# Patient Record
Sex: MALE | Race: BLACK OR AFRICAN AMERICAN | NOT HISPANIC OR LATINO | Employment: FULL TIME | URBAN - METROPOLITAN AREA
[De-identification: names, ages, dates, MRNs, and addresses within clinical notes are randomized per-mention and may not be internally consistent; named-entity substitution may affect disease eponyms.]

---

## 2018-06-01 ENCOUNTER — OFFICE VISIT (OUTPATIENT)
Dept: INTERNAL MEDICINE | Facility: CLINIC | Age: 28
End: 2018-06-01
Payer: COMMERCIAL

## 2018-06-01 ENCOUNTER — LAB VISIT (OUTPATIENT)
Dept: LAB | Facility: HOSPITAL | Age: 28
End: 2018-06-01
Attending: FAMILY MEDICINE
Payer: COMMERCIAL

## 2018-06-01 VITALS
HEART RATE: 87 BPM | DIASTOLIC BLOOD PRESSURE: 76 MMHG | OXYGEN SATURATION: 98 % | SYSTOLIC BLOOD PRESSURE: 132 MMHG | WEIGHT: 203.25 LBS | HEIGHT: 70 IN | TEMPERATURE: 98 F | BODY MASS INDEX: 29.1 KG/M2

## 2018-06-01 DIAGNOSIS — F41.0 PANIC DISORDER WITHOUT AGORAPHOBIA WITH MILD PANIC ATTACKS: ICD-10-CM

## 2018-06-01 DIAGNOSIS — Z00.00 ENCOUNTER FOR WELLNESS EXAMINATION: ICD-10-CM

## 2018-06-01 DIAGNOSIS — Z00.00 ENCOUNTER FOR WELLNESS EXAMINATION: Primary | ICD-10-CM

## 2018-06-01 LAB
25(OH)D3+25(OH)D2 SERPL-MCNC: 14 NG/ML
ALBUMIN SERPL BCP-MCNC: 4.5 G/DL
ALP SERPL-CCNC: 60 U/L
ALT SERPL W/O P-5'-P-CCNC: 17 U/L
ANION GAP SERPL CALC-SCNC: 8 MMOL/L
AST SERPL-CCNC: 19 U/L
BASOPHILS # BLD AUTO: 0.04 K/UL
BASOPHILS NFR BLD: 0.7 %
BILIRUB SERPL-MCNC: 0.5 MG/DL
BUN SERPL-MCNC: 14 MG/DL
CALCIUM SERPL-MCNC: 9.8 MG/DL
CHLORIDE SERPL-SCNC: 105 MMOL/L
CHOLEST SERPL-MCNC: 166 MG/DL
CHOLEST/HDLC SERPL: 2.8 {RATIO}
CO2 SERPL-SCNC: 29 MMOL/L
CREAT SERPL-MCNC: 1.2 MG/DL
DIFFERENTIAL METHOD: NORMAL
EOSINOPHIL # BLD AUTO: 0.1 K/UL
EOSINOPHIL NFR BLD: 1.8 %
ERYTHROCYTE [DISTWIDTH] IN BLOOD BY AUTOMATED COUNT: 11.7 %
EST. GFR  (AFRICAN AMERICAN): >60 ML/MIN/1.73 M^2
EST. GFR  (NON AFRICAN AMERICAN): >60 ML/MIN/1.73 M^2
GLUCOSE SERPL-MCNC: 101 MG/DL
HCT VFR BLD AUTO: 44.6 %
HDLC SERPL-MCNC: 59 MG/DL
HDLC SERPL: 35.5 %
HGB BLD-MCNC: 15.3 G/DL
IMM GRANULOCYTES # BLD AUTO: 0.02 K/UL
IMM GRANULOCYTES NFR BLD AUTO: 0.4 %
LDLC SERPL CALC-MCNC: 101.4 MG/DL
LYMPHOCYTES # BLD AUTO: 1.7 K/UL
LYMPHOCYTES NFR BLD: 30.6 %
MCH RBC QN AUTO: 30.4 PG
MCHC RBC AUTO-ENTMCNC: 34.3 G/DL
MCV RBC AUTO: 89 FL
MONOCYTES # BLD AUTO: 0.6 K/UL
MONOCYTES NFR BLD: 11.2 %
NEUTROPHILS # BLD AUTO: 3 K/UL
NEUTROPHILS NFR BLD: 55.3 %
NONHDLC SERPL-MCNC: 107 MG/DL
NRBC BLD-RTO: 0 /100 WBC
PLATELET # BLD AUTO: 232 K/UL
PMV BLD AUTO: 10.5 FL
POTASSIUM SERPL-SCNC: 4.2 MMOL/L
PROT SERPL-MCNC: 7.8 G/DL
RBC # BLD AUTO: 5.04 M/UL
SODIUM SERPL-SCNC: 142 MMOL/L
TRIGL SERPL-MCNC: 28 MG/DL
TSH SERPL DL<=0.005 MIU/L-ACNC: 1.16 UIU/ML
WBC # BLD AUTO: 5.43 K/UL

## 2018-06-01 PROCEDURE — 99204 OFFICE O/P NEW MOD 45 MIN: CPT | Mod: S$GLB,,, | Performed by: FAMILY MEDICINE

## 2018-06-01 PROCEDURE — 84443 ASSAY THYROID STIM HORMONE: CPT

## 2018-06-01 PROCEDURE — 80053 COMPREHEN METABOLIC PANEL: CPT

## 2018-06-01 PROCEDURE — 85025 COMPLETE CBC W/AUTO DIFF WBC: CPT

## 2018-06-01 PROCEDURE — 36415 COLL VENOUS BLD VENIPUNCTURE: CPT | Mod: PO

## 2018-06-01 PROCEDURE — 82306 VITAMIN D 25 HYDROXY: CPT

## 2018-06-01 PROCEDURE — 80061 LIPID PANEL: CPT

## 2018-06-01 PROCEDURE — 99999 PR PBB SHADOW E&M-EST. PATIENT-LVL IV: CPT | Mod: PBBFAC,,, | Performed by: FAMILY MEDICINE

## 2018-06-01 RX ORDER — VENLAFAXINE HYDROCHLORIDE 75 MG/1
75 CAPSULE, EXTENDED RELEASE ORAL DAILY
Qty: 30 CAPSULE | Refills: 6 | Status: SHIPPED | OUTPATIENT
Start: 2018-06-01 | End: 2019-05-08

## 2018-06-01 RX ORDER — VENLAFAXINE HYDROCHLORIDE 37.5 MG/1
37.5 CAPSULE, EXTENDED RELEASE ORAL DAILY
Qty: 7 CAPSULE | Refills: 0 | Status: SHIPPED | OUTPATIENT
Start: 2018-06-01 | End: 2018-06-08

## 2018-06-01 RX ORDER — HYDROXYZINE PAMOATE 50 MG/1
CAPSULE ORAL
Refills: 0 | COMMUNITY
Start: 2018-04-09 | End: 2019-05-08

## 2018-06-01 NOTE — PROGRESS NOTES
Subjective:       Patient ID: Alphonse Claude Mackey III is a 27 y.o. male.    Chief Complaint: Establish Care and Anxiety    26 yo male here to establish care. He has had 3 recent episodes of chest pain and shortness of breath with an unsettled feeling in his stomach. He was seen in the emergency room at [facility] 4/8/18 and was diagnosed with anxiety. ER record reviewed with work up that included CXR and EKG. He was prescribed hydroxyzine as needed and finds the medication effective--he has not had to take it very often. He has been doing ok in terms of his daily functioning but is concerned that this feeling continues to happen to him without warning.       Anxiety   Symptoms include nervous/anxious behavior. Patient reports no chest pain, decreased concentration, palpitations or shortness of breath.          does not have any pertinent problems on file.  Past Medical History:   Diagnosis Date    Anxiety      Past Surgical History:   Procedure Laterality Date    ADENOIDECTOMY      TONSILLECTOMY       History reviewed. No pertinent family history.  Social History     Social History    Marital status: Single     Spouse name: N/A    Number of children: N/A    Years of education: N/A     Occupational History    Not on file.     Social History Main Topics    Smoking status: Never Smoker    Smokeless tobacco: Not on file    Alcohol use No    Drug use: No    Sexual activity: Not on file     Other Topics Concern    Not on file     Social History Narrative    No narrative on file     Review of Systems   Constitutional: Positive for appetite change. Negative for activity change, fatigue, fever and unexpected weight change.   HENT: Negative for dental problem and hearing loss.    Eyes: Negative for pain and visual disturbance.   Respiratory: Negative for shortness of breath and wheezing.    Cardiovascular: Negative for chest pain and palpitations.   Gastrointestinal: Negative for abdominal pain, constipation and  diarrhea.   Genitourinary: Negative for difficulty urinating and dysuria.        No erectile dysfunction   Musculoskeletal: Negative for joint swelling and myalgias.   Skin: Negative for rash and wound.   Neurological: Negative for light-headedness and headaches.   Psychiatric/Behavioral: Negative for decreased concentration, dysphoric mood and sleep disturbance. The patient is nervous/anxious.        Objective:     Vitals:    06/01/18 0901   BP: 132/76   Pulse: 87   Temp: 98 °F (36.7 °C)        Physical Exam   Constitutional: He is oriented to person, place, and time. He appears well-developed and well-nourished.   HENT:   Head: Normocephalic and atraumatic.   Nose: Nose normal.   Mouth/Throat: Oropharynx is clear and moist.   Eyes: Conjunctivae and EOM are normal. Pupils are equal, round, and reactive to light. No scleral icterus.   Neurological: He is alert and oriented to person, place, and time.   Normal gait.   No speech abnormality   Psychiatric: He has a normal mood and affect. His behavior is normal. Judgment and thought content normal.   Nursing note and vitals reviewed.      Assessment:       1. Encounter for wellness examination    2. Panic disorder without agoraphobia with mild panic attacks        Plan:           Problem List Items Addressed This Visit     Panic disorder without agoraphobia with mild panic attacks    Current Assessment & Plan     Start on low dose Effexor; discussed medication use for prevention of anxiety and panic attacks as well as the benefit of cognitive behavioral therapy; referral made to psychology         Relevant Medications    venlafaxine (EFFEXOR-XR) 37.5 MG 24 hr capsule    venlafaxine (EFFEXOR-XR) 75 MG 24 hr capsule    Other Relevant Orders    TSH    Ambulatory referral to Psychology    Encounter for wellness examination - Primary    Current Assessment & Plan     Wellness labs ordered         Relevant Orders    CBC auto differential    Comprehensive metabolic panel     Lipid panel    Vitamin D

## 2018-06-01 NOTE — ASSESSMENT & PLAN NOTE
Start on low dose Effexor; discussed medication use for prevention of anxiety and panic attacks as well as the benefit of cognitive behavioral therapy; referral made to psychology

## 2018-06-01 NOTE — PATIENT INSTRUCTIONS
Understanding Panic Disorder (Panic Attack)  A panic attack is a sudden, intense fear that lasts for several minutes when there is no real danger. With it comes terror, physical symptoms, and a strong need to escape from wherever you are. If you have these attacks often, you have panic disorder. The attacks can be very frightening. You may be scared of having another one. You may even stay away from a place where youve had an attack. Some people become so afraid of having panic attacks, its very hard for them to leave home.  Before accepting a diagnosis of panic disorder, its important to see your healthcare provider. Your provider will evaluate your symptoms to make sure you dont have another health condition that is causing the symptoms. Conditions that can cause panic symptoms include certain heart and lung conditions and thyroid disease. Other things can also cause panic attack symptoms. These include having too much caffeine or using other stimulants, such as certain medicines.    What does a panic attack feel like?  Most panic attacks start suddenly, for no clear reason. They last about 5 to 20 minutes. A panic attack can start at any time, even while youre sleeping. During the attack, you may have:  · A sudden surge of anxiety, as if you just missed hitting someone with your car. But with a panic attack, youre anxious for no clear reason  · Physical symptoms, such as sweating, shortness of breath, a pounding heart, trembling, feeling like youre choking, chest pain, nausea, or dizziness  · A fear that youre having a heart attack, dying, or about to lose control  · A feeling that things happening around you are not real  What to do during a panic attack  · Remind yourself that your body is having a false alarm. Nothing bad will happen to you. Youve survived attacks before, and you will this time, too.  · Dont fight your feelings. Let them come. Ride them out. Focus on a task like counting backward  from 100. Think about some place relaxing, such as a tropical island or quiet meadow. Ask your healthcare provider or therapist to suggest other relaxation techniques.  · If your symptoms worsen or occur more often, see your healthcare provider.  Help to overcome the fear  Fear of a panic attack can make you miserable. But with professional support and self-monitoring strategies, this fear can be managed. Ask your healthcare provider or therapist for help. Remember these tips:  · Keep in mind that places and activities dont cause attacks. Separate the attack from the situation. Make an effort not to avoid the situation in the future.  · Dont give in to the temptation to use alcohol or unprescribed medicines as an escape. In the long run, they will only add to your problems.   Warning signs for suicide  Panic disorders can be a discouraging, frightening condition that can lead some people to consider self-harm or suicide. It is very important to work with a trusted therapist, take any medicines as prescribed, and seek help if you have any of these symptoms:  · Thinking often about taking your life  · Planning how you may try to end your life.  · Talking or writing about committing suicide  · Feeling that death is the only solution to your problems  · Feeling a pressing need to make out your will or arrange your   · Giving away things you own  · Participating in risky behaviors, such as sex with someone you don't know or drinking and driving  If you notice any of these warning signs, get help right away. You can call a mental health clinic, a 24-hour suicide crisis hotline, or go to a hospital emergency room.If there is an immediate risk, call 911.   Other resources  National Suicide Prevention Lifeline  340.724.4397 (339-112-DHBT)  www.suicidepreventionlifeline.org    National Suicide Hotline  633.619.1226 (800-SUICIDE)    National [place] [employer] [facility] [employer]ness (LEAH)  682.479.9341  www.leah.org    Mental  [facility]  235.157.3731  www.[facility].org  Date Last Reviewed: 5/1/2017  © 1744-1149 [employer], [employer]. [address] [place] [address], [place] [address]. All rights reserved. This information is not intended as a substitute for professional medical care. Always follow your healthcare professional's instructions.          Treating Panic Disorder (Panic Attack) with Therapy  If you have frequent panic attacks, you dont have to suffer anymore. Treatment is available. Therapy (also called counseling) is often a helpful treatment for panic disorder. With therapy, a specially trained professional (therapist) helps you face and learn to manage your anxiety. Therapy can be short-term or long-term depending on your needs. In some cases, medicine may also be prescribed along with therapy. It may take time before you notice how much therapy is helping, but stick with it. With therapy, you can feel better.    Cognitive behavioral therapy (CBT)  Cognitive behavioral therapy (CBT) teaches you to manage anxiety that can lead to panic attacks. It does this by helping you understand how you think and act when youre anxious. Research has shown CBT to be a very effective treatment for panic disorder. How CBT is run is almost like a class. It involves homework and activities to build skills that teach you to cope with anxiety step by step. It can be done in a group or one-on-one, and often takes place for a set number of sessions. CBT has two main parts:  · Cognitive therapy helps you identify the negative, irrational thoughts that occur with your anxiety. Youll learn to replace these with more positive, realistic thoughts.  · Behavioral therapy helps you change how you react to anxiety. Youll learn coping skills and methods for relaxing to help you better deal with anxiety.  Other forms of therapy  Other therapy methods may work better for you than CBT. Or, you may move from CBT to another form of therapy as your treatment needs  change. This may mean meeting with a therapist by yourself or in a group. Therapy can also help you work through problems in your life, such as drug or alcohol dependence, that may be making your anxiety worse.  Warning signs for suicide  Panic disorders can be a discouraging, frightening condition that can lead some people to consider self-harm or suicide. It is very important to work with a trusted therapist, take any medicines as prescribed, and seek help if you have any of these symptoms:  · Thinking often about taking your life  · Planning how you may attempt it.  · Talking or writing about committing suicide  · Feeling that death is the only solution to your problems  · Feeling a pressing need to make out your will or arrange your   · Giving away things you own  · Participating in risky behaviors, such as sex with someone you don't know or drinking and driving  If you notice any of these warning signs, get help right away. You can call a mental health clinic, a 24-hour suicide crisis hotline, or go to a hospital emergency room.  Other resources  National Suicide Prevention Lifeline  255.442.3583 (481-125-SOEP)  www.suicidepreventionlifeline.org  National Suicide Hotline  622.899.5729 (800-SUICIDE)  National [place] [employer] Mental Illness (LEAH)  574.880.5659  www.leah.org  [facility] [place]  490.228.9544  www.[facility].org  Getting better takes time  Therapy will help you feel better and teach you skills to help manage your panic disorder long term. But change doesnt happen right away. It takes a commitment from you. And treatment only works if you learn to face the causes of your anxiety. So, you might feel worse before you feel better. This can sometimes make it hard to stick with it. But remember: Therapy is a very effective treatment. The results will be well worth it.  Date Last Reviewed: 2017  © 5397-2664 [employer]. [address] [place] [address], [place] [address]. All rights reserved.  This information is not intended as a substitute for professional medical care. Always follow your healthcare professional's instructions.

## 2018-06-06 ENCOUNTER — TELEPHONE (OUTPATIENT)
Dept: INTERNAL MEDICINE | Facility: CLINIC | Age: 28
End: 2018-06-06

## 2018-06-06 NOTE — TELEPHONE ENCOUNTER
Spoke with patient via phone. He is reluctant to take Effexor because he is concerned about his body becoming dependent on a medication. We discussed risks and benefits of med. Effexor not considered addictive but helps some people long-term with control of anxiety. Discussed therapy as another option for controlling anxiety. He will think about it and let me know.

## 2018-06-06 NOTE — TELEPHONE ENCOUNTER
----- Message from Desi Fischer sent at 6/5/2018  4:50 PM CDT -----  Contact: Pt   Pt requested a callback to discuss medication he was prescribed ..[phone]

## 2018-06-25 ENCOUNTER — TELEPHONE (OUTPATIENT)
Dept: INTERNAL MEDICINE | Facility: CLINIC | Age: 28
End: 2018-06-25

## 2018-06-25 NOTE — TELEPHONE ENCOUNTER
Informed pt that the doctor would not be in clinic on 6/29/18 . Pt stated he will have to call back to reschedule this appt. /srb

## 2018-09-03 PROBLEM — Z00.00 ENCOUNTER FOR WELLNESS EXAMINATION: Status: RESOLVED | Noted: 2018-06-01 | Resolved: 2018-09-03

## 2019-05-08 ENCOUNTER — OFFICE VISIT (OUTPATIENT)
Dept: INTERNAL MEDICINE | Facility: CLINIC | Age: 29
End: 2019-05-08
Payer: COMMERCIAL

## 2019-05-08 VITALS
HEIGHT: 70 IN | SYSTOLIC BLOOD PRESSURE: 130 MMHG | HEART RATE: 96 BPM | BODY MASS INDEX: 30.21 KG/M2 | TEMPERATURE: 98 F | OXYGEN SATURATION: 99 % | WEIGHT: 211 LBS | DIASTOLIC BLOOD PRESSURE: 76 MMHG

## 2019-05-08 DIAGNOSIS — K64.1 PROLAPSED INTERNAL HEMORRHOIDS, GRADE 2: ICD-10-CM

## 2019-05-08 PROCEDURE — 99214 PR OFFICE/OUTPT VISIT, EST, LEVL IV, 30-39 MIN: ICD-10-PCS | Mod: S$GLB,,, | Performed by: FAMILY MEDICINE

## 2019-05-08 PROCEDURE — 99214 OFFICE O/P EST MOD 30 MIN: CPT | Mod: S$GLB,,, | Performed by: FAMILY MEDICINE

## 2019-05-08 PROCEDURE — 3008F BODY MASS INDEX DOCD: CPT | Mod: CPTII,S$GLB,, | Performed by: FAMILY MEDICINE

## 2019-05-08 PROCEDURE — 99999 PR PBB SHADOW E&M-EST. PATIENT-LVL III: CPT | Mod: PBBFAC,,, | Performed by: FAMILY MEDICINE

## 2019-05-08 PROCEDURE — 3008F PR BODY MASS INDEX (BMI) DOCUMENTED: ICD-10-PCS | Mod: CPTII,S$GLB,, | Performed by: FAMILY MEDICINE

## 2019-05-08 PROCEDURE — 99999 PR PBB SHADOW E&M-EST. PATIENT-LVL III: ICD-10-PCS | Mod: PBBFAC,,, | Performed by: FAMILY MEDICINE

## 2019-05-08 RX ORDER — POLYETHYLENE GLYCOL 3350 17 G/17G
17 POWDER, FOR SOLUTION ORAL DAILY PRN
Qty: 1 EACH | Refills: 0
Start: 2019-05-08 | End: 2021-04-09

## 2019-05-08 RX ORDER — HYDROCORTISONE ACETATE PRAMOXINE HCL 1; 1 G/100G; G/100G
CREAM TOPICAL 2 TIMES DAILY
Qty: 28.4 G | Refills: 3 | Status: SHIPPED | OUTPATIENT
Start: 2019-05-08 | End: 2021-04-09

## 2019-05-08 NOTE — PROGRESS NOTES
Subjective:       Patient ID: Alphonse Claude Mackey III is a 28 y.o. male.    Chief Complaint: possible hernia    27 yo male here with odd sensation to left side of his rectum that he noticed 2 days ago. No obvious injury or inciting event. Some pain/pressure with coughing and sneezing. No bleeding or pain with bowel movements. No itching. No nausea/vomiting. No skin changes or rash. Urination has been normal.     He weight lifts several days per week; squats and dead lifts.     does not have any pertinent problems on file.  Past Medical History:   Diagnosis Date    Anxiety      Past Surgical History:   Procedure Laterality Date    ADENOIDECTOMY      TONSILLECTOMY       History reviewed. No pertinent family history.  Social History     Socioeconomic History    Marital status: Single     Spouse name: Not on file    Number of children: Not on file    Years of education: Not on file    Highest education level: Not on file   Occupational History    Not on file   Social Needs    Financial resource strain: Not on file    Food insecurity:     Worry: Not on file     Inability: Not on file    Transportation needs:     Medical: Not on file     Non-medical: Not on file   Tobacco Use    Smoking status: Never Smoker   Substance and Sexual Activity    Alcohol use: No    Drug use: No    Sexual activity: Not on file   Lifestyle    Physical activity:     Days per week: Not on file     Minutes per session: Not on file    Stress: Not on file   Relationships    Social connections:     Talks on phone: Not on file     Gets together: Not on file     Attends [employer] service: Not on file     Active member of club or organization: Not on file     Attends meetings of clubs or organizations: Not on file     Relationship status: Not on file   Other Topics Concern    Not on file   Social History Narrative    Not on file     Review of Systems   Constitutional: Negative for activity change, appetite change, chills and  fatigue.   Gastrointestinal: Positive for rectal pain. Negative for anal bleeding, blood in stool and diarrhea.       Objective:     Vitals:    05/08/19 1137   BP: 130/76   Pulse: 96   Temp: 97.7 °F (36.5 °C)        Physical Exam   Constitutional: He is oriented to person, place, and time. He appears well-developed and well-nourished.   HENT:   Head: Normocephalic and atraumatic.   Nose: Nose normal.   Mouth/Throat: Oropharynx is clear and moist.   Eyes: Pupils are equal, round, and reactive to light. Conjunctivae and EOM are normal. No scleral icterus.   Genitourinary: Rectal exam shows internal hemorrhoid (with slight protrusion through the anus). Rectal exam shows no fissure, no mass, no tenderness and anal tone normal.   Neurological: He is alert and oriented to person, place, and time.   Normal gait.   No speech abnormality   Psychiatric: He has a normal mood and affect. His behavior is normal. Judgment and thought content normal.   Nursing note and vitals reviewed.      Assessment:       1. Prolapsed internal hemorrhoids, grade 2        Plan:           Problem List Items Addressed This Visit        GI    Prolapsed internal hemorrhoids, grade 2    Overview     Info given; rectal steroid cream prescribed

## 2019-05-08 NOTE — PATIENT INSTRUCTIONS
Hemorrhoids    Hemorrhoids are swollen and inflamed veins inside the rectum and near the anus. The rectum is the last several inches of the colon. The anus is the passage between the rectum and the outside of the body.  Causes  The veins can become swollen due to increased pressure in them. This is most often caused by:  · Chronic constipation or diarrhea  · Straining when having a bowel movement  · Sitting too long on the toilet  · A low-fiber diet  · Pregnancy  Symptoms  · Bleeding from the rectum (this may be noticeable after bowel movements)  · Lump near the anus  · Itching around the anus  · Pain around the anus  There are different types of hemorrhoids. Depending on the type you have and the severity, you may be able to treat yourself at home. In some cases, a procedure may be the best treatment option. Your healthcare provider can tell you more about this, if needed.  Home care  General care  · To get relief from pain or itching, try:  ¨ Topical products. Your healthcare provider may prescribe or recommend creams, ointments, or pads that can be applied to the hemorrhoid. Use these exactly as directed.  ¨ Medicines. Your healthcare provider may recommend stool softeners, suppositories, or laxatives to help manage constipation. Use these exactly as directed.  ¨ Sitz baths. A sitz bath involves sitting in a few inches of warm bath water. Be careful not to make the water so hot that you burn yourself--test it before sitting in it. Soak for about 10 to 15 minutes a few times a day. This may help relieve pain.  Tips to help prevent hemorrhoids  · Eat more fiber. Fiber adds bulk to stool and absorbs water as it moves through your colon. This makes stool softer and easier to pass.  ¨ Increase the fiber in your diet with more fiber-rich foods. These include fresh fruit, vegetables, and whole grains.  ¨ Take a fiber supplement or bulking agent, if advised to by your provider. These include products such as psyllium  or methylcellulose.  · Drink plenty of water, if directed to by your provider. This can help keep stool soft.  · Be more active. Frequent exercise aids digestion and helps prevent constipation. It may also help make bowel movements more regular.  · Dont strain during bowel movements. This can make hemorrhoids more likely. Also, dont sit on the toilet for long periods of time.  Follow-up care  Follow up with your healthcare provider, or as advised. If a culture or imaging tests were done, you will be notified of the results when they are ready. This may take a few days or longer.  When to seek medical advice  Call your healthcare provider right away if any of these occur:  · Increased bleeding from the rectum  · Increased pain around the rectum or anus  · Weakness or dizziness  Call 911  Call 911 or return to the emergency department right away if any of these occur:  · Trouble breathing or swallowing  · Fainting or loss of consciousness  · Unusually fast heart rate  · Vomiting blood  · Large amounts of blood in stool  Date Last Reviewed: 6/22/2015 © 2000-2017 [employer]. [address] [place] [address], [place] [address]. All rights reserved. This information is not intended as a substitute for professional medical care. Always follow your healthcare professional's instructions.

## 2019-10-14 ENCOUNTER — TELEPHONE (OUTPATIENT)
Dept: INTERNAL MEDICINE | Facility: CLINIC | Age: 29
End: 2019-10-14

## 2019-10-14 NOTE — TELEPHONE ENCOUNTER
----- Message from Rebeca Razo sent at 10/14/2019 12:18 PM CDT -----  Contact: pt  Patient called from the ER ([facility]) possible pulled remedios altamirano and wanted to let you know. He can be reached at [phone].         Thanks,  Rebeca Razo

## 2019-10-16 ENCOUNTER — TELEPHONE (OUTPATIENT)
Dept: INTERNAL MEDICINE | Facility: CLINIC | Age: 29
End: 2019-10-16

## 2019-11-15 ENCOUNTER — OFFICE VISIT (OUTPATIENT)
Dept: INTERNAL MEDICINE | Facility: CLINIC | Age: 29
End: 2019-11-15
Payer: COMMERCIAL

## 2019-11-15 VITALS
HEART RATE: 93 BPM | BODY MASS INDEX: 29.46 KG/M2 | HEIGHT: 70 IN | OXYGEN SATURATION: 98 % | TEMPERATURE: 102 F | SYSTOLIC BLOOD PRESSURE: 130 MMHG | DIASTOLIC BLOOD PRESSURE: 86 MMHG | WEIGHT: 205.75 LBS

## 2019-11-15 DIAGNOSIS — J10.1 INFLUENZA B: ICD-10-CM

## 2019-11-15 DIAGNOSIS — J06.9 VIRAL URI: Primary | ICD-10-CM

## 2019-11-15 LAB
DEPRECATED S PYO AG THROAT QL EIA: NEGATIVE
INFLUENZA A, MOLECULAR: NEGATIVE
INFLUENZA B, MOLECULAR: POSITIVE
SPECIMEN SOURCE: ABNORMAL

## 2019-11-15 PROCEDURE — 87081 CULTURE SCREEN ONLY: CPT

## 2019-11-15 PROCEDURE — 3008F PR BODY MASS INDEX (BMI) DOCUMENTED: ICD-10-PCS | Mod: CPTII,S$GLB,, | Performed by: PHYSICIAN ASSISTANT

## 2019-11-15 PROCEDURE — 87502 INFLUENZA DNA AMP PROBE: CPT

## 2019-11-15 PROCEDURE — 3008F BODY MASS INDEX DOCD: CPT | Mod: CPTII,S$GLB,, | Performed by: PHYSICIAN ASSISTANT

## 2019-11-15 PROCEDURE — 99999 PR PBB SHADOW E&M-EST. PATIENT-LVL III: ICD-10-PCS | Mod: PBBFAC,,, | Performed by: PHYSICIAN ASSISTANT

## 2019-11-15 PROCEDURE — 99214 OFFICE O/P EST MOD 30 MIN: CPT | Mod: S$GLB,,, | Performed by: PHYSICIAN ASSISTANT

## 2019-11-15 PROCEDURE — 99214 PR OFFICE/OUTPT VISIT, EST, LEVL IV, 30-39 MIN: ICD-10-PCS | Mod: S$GLB,,, | Performed by: PHYSICIAN ASSISTANT

## 2019-11-15 PROCEDURE — 99999 PR PBB SHADOW E&M-EST. PATIENT-LVL III: CPT | Mod: PBBFAC,,, | Performed by: PHYSICIAN ASSISTANT

## 2019-11-15 PROCEDURE — 87880 STREP A ASSAY W/OPTIC: CPT

## 2019-11-15 RX ORDER — OSELTAMIVIR PHOSPHATE 75 MG/1
75 CAPSULE ORAL 2 TIMES DAILY
Qty: 10 CAPSULE | Refills: 0 | Status: SHIPPED | OUTPATIENT
Start: 2019-11-15 | End: 2019-11-20

## 2019-11-15 NOTE — PATIENT INSTRUCTIONS
Viral Upper Respiratory Illness (Adult)  You have a viral upper respiratory illness (URI), which is another term for the common cold. This illness is contagious during the first few days. It is spread through the air by coughing and sneezing. It may also be spread by direct contact (touching the sick person and then touching your own eyes, nose, or mouth). Frequent handwashing will decrease risk of spread. Most viral illnesses go away within 7 to 10 days with rest and simple home remedies. Sometimes the illness may last for several weeks. Antibiotics will not kill a virus, and they are generally not prescribed for this condition.    Home care  · If symptoms are severe, rest at home for the first 2 to 3 days. When you resume activity, don't let yourself get too tired.  · Avoid being exposed to cigarette smoke (yours or others).  · You may use acetaminophen or ibuprofen to control pain and fever, unless another medicine was prescribed. (Note: If you have chronic liver or kidney disease, have ever had a stomach ulcer or gastrointestinal bleeding, or are taking blood-thinning medicines, talk with your healthcare provider before using these medicines.) Aspirin should never be given to anyone under 18 years of age who is ill with a viral infection or fever. It may cause severe liver or brain damage.  · Your appetite may be poor, so a light diet is fine. Avoid dehydration by drinking 6 to 8 glasses of fluids per day (water, soft drinks, juices, tea, or soup). Extra fluids will help loosen secretions in the nose and lungs.  · Over-the-counter cold medicines will not shorten the length of time youre sick, but they may be helpful for the following symptoms: cough, sore throat, and nasal and sinus congestion. (Note: Do not use decongestants if you have high blood pressure.)  Follow-up care  Follow up with your healthcare provider, or as advised.  When to seek medical advice  Call your healthcare provider right away if any  of these occur:  · Cough with lots of colored sputum (mucus)  · Severe headache; face, neck, or ear pain  · Difficulty swallowing due to throat pain  · Fever of 100.4°F (38°C)  Call 911, or get immediate medical care  Call emergency services right away if any of these occur:  · Chest pain, shortness of breath, wheezing, or difficulty breathing  · Coughing up blood  · Inability to swallow due to throat pain  Date Last Reviewed: 9/13/2015 © 2000-2017 [employer] [address] [place] [address]. All rights reserved. This information is not intended as a substitute for professional medical care. Always follow your healthcare professional's instructions.        Influenza (Adult)    Influenza is also called the flu. It is a viral illness that affects the air passages of your lungs. It is different from the common cold. The flu can easily be passed from one to person to another. It may be spread through the air by coughing and sneezing. Or it can be spread by touching the sick person and then touching your own eyes, nose, or mouth.  The flu starts 1 to 3 days after you are exposed to the flu virus. It may last for 1 to 2 weeks but many people feel tired or fatigued for many weeks afterward. You usually dont need to take antibiotics unless you have a complication. This might be an ear or sinus infection or pneumonia.  Symptoms of the flu may be mild or severe. They can include extreme tiredness (wanting to stay in bed all day), chills, fevers, muscle aches, soreness with eye movement, headache, and a dry, hacking cough.  Home care  Follow these guidelines when caring for yourself at home:  · Avoid being around cigarette smoke, whether yours or other peoples.  · Acetaminophen or ibuprofen will help ease your fever, muscle aches, and headache. Dont give aspirin to anyone younger than 18 who has the flu. Aspirin can harm the liver.  · Nausea and loss of appetite are common with the flu. Eat light  meals. Drink 6 to 8 glasses of liquids every day. Good choices are water, sport drinks, soft drinks without caffeine, juices, tea, and soup. Extra fluids will also help loosen secretions in your nose and lungs.  · Over-the-counter cold medicines will not make the flu go away faster. But the medicines may help with coughing, sore throat, and congestion in your nose and sinuses. Dont use a decongestant if you have high blood pressure.  · Stay home until your fever has been gone for at least 24 hours without using medicine to reduce fever.  Follow-up care  Follow up with your healthcare provider, or as advised, if you are not getting better over the next week.  If you are age 65 or older, talk with your provider about getting a pneumococcal vaccine every 5 years. You should also get this vaccine if you have chronic asthma or COPD. All adults should get a flu vaccine every fall. Ask your provider about this.  When to seek medical advice  Call your healthcare provider right away if any of these occur:  · Cough with lots of colored mucus (sputum) or blood in your mucus  · Chest pain, shortness of breath, wheezing, or trouble breathing  · Severe headache, or face, neck, or ear pain  · New rash with fever  · Fever of 100.4°F (38°C) or higher, or as directed by your healthcare provider  · Confusion, behavior change, or seizure  · Severe weakness or dizziness  · You get a new fever or cough after getting better for a few days  Date Last Reviewed: 1/1/2017  © 4109-7449 [employer]. [address] [place] [address], [place] [address]. All rights reserved. This information is not intended as a substitute for professional medical care. Always follow your healthcare professional's instructions.        The Flu (Influenza)     The virus that causes the flu spreads through the air in droplets when someone who has the flu coughs, sneezes, laughs, or talks.   The flu (influenza) is an infection that affects your respiratory tract.  This tract is made up of your mouth, nose, and lungs, and the passages between them. Unlike a cold, the flu can make you very ill. And it can lead to pneumonia, a serious lung infection. The flu can have serious complications and even cause death.  Who is at risk for the flu?  Anyone can get the flu. But you are more likely to become infected if you:  · Have a weakened immune system  · Work in a healthcare setting where you may be exposed to flu germs  · Live or work with someone who has the flu  · Havent had an annual flu shot  How does the flu spread?  The flu is caused by a virus. The virus spreads through the air in droplets when someone who has the flu coughs, sneezes, laughs, or talks. You can become infected when you inhale these viruses directly. You can also become infected when you touch a surface on which the droplets have landed and then transfer the germs to your eyes, nose, or mouth. Touching used tissues, or sharing utensils, drinking glasses, or a toothbrush from an infected person can expose you to flu viruses, too.  What are the symptoms of the flu?  Flu symptoms tend to come on quickly and may last a few days to a few weeks. They include:  · Fever usually higher than 100.4°F  (38°C) and chills  · Sore throat and headache  · Dry cough  · Runny nose  · Tiredness and weakness  · Muscle aches  Who is at risk for flu complications?  For some people, the flu can be very serious. The risk for complications is greater for:  · Children younger than age 5  · Adults ages 65 and older  · People with a chronic illness such as diabetes or heart, kidney, or lung disease  · People who live in a nursing home or long-term care facility   How is the flu treated?  The flu usually gets better after 7 days or so. In some cases, your healthcare provider may prescribe an antiviral medicine. This may help you get well a little sooner. For the medicine to help, you need to take it as soon as possible (ideally within 48  hours) after your symptoms start. If you develop pneumonia or other serious illness, you may need to stay in the hospital.  Easing flu symptoms  · Drink lots of fluids such as water, juice, and warm soup. A good rule is to drink enough so that you urinate your normal amount.  · Get plenty of rest.  · Ask your healthcare provider what to take for fever and pain.  · Call your provider if your fever is 100.4°F (38°C) or higher, or you become dizzy, lightheaded, or short of breath.  Taking steps to protect others  · Wash your hands often, especially after coughing or sneezing. Or clean your hands with an alcohol-based hand  containing at least 60% alcohol.  · Cough or sneeze into a tissue. Then throw the tissue away and wash your hands. If you dont have a tissue, cough and sneeze into your elbow.  · Stay home until at least 24 hours after you no longer have a fever or chills. Be sure the fever isnt being hidden by fever-reducing medicine.  · Dont share food, utensils, drinking glasses, or a toothbrush with others.  · Ask your healthcare provider if others in your household should get antiviral medicine to help them avoid infection.  How can the flu be prevented?  · One of the best ways to avoid the flu is to get a flu vaccine each year. The virus that causes the flu changes from year to year. For that reason, healthcare providers recommend getting the flu vaccine each year, as soon as it's available in your area. The vaccine is given as a shot. Your healthcare provider can tell you which vaccine is right for you. A nasal spray is also available but is not recommended for the 8801-6647 flu season. The CDC says this is because the nasal spray did not seem to protect against the flu over the last several flu seasons. In the past, it was meant for people ages 2 to 49.  · Wash your hands often. Frequent handwashing is a proven way to help prevent infection.  · Carry an alcohol-based hand gel containing at least 60%  alcohol. Use it when you can't use soap and water. Then wash your hands as soon as you can.  · Avoid touching your eyes, nose, and mouth.  · At home and work, clean phones, computer keyboards, and toys often with disinfectant wipes.  · If possible, avoid close contact with others who have the flu or symptoms of the flu.  Handwashing tips  Handwashing is one of the best ways to prevent many common infections. If you are caring for or visiting someone with the flu, wash your hands each time you enter and leave the room. Follow these steps:  · Use warm water and plenty of soap. Rub your hands together well.  · Clean the whole hand, including under your nails, between your fingers, and up the wrists.  · Wash for at least 15 seconds.  · Rinse, letting the water run down your fingers, not up your wrists.  · Dry your hands well. Use a paper towel to turn off the faucet and open the door.  Using alcohol-based hand   Alcohol-based hand  are also a good choice. Use them when you can't use soap and water. Follow these steps:  · Squeeze about a tablespoon of gel into the palm of one hand.  · Rub your hands together briskly, cleaning the backs of your hands, the palms, between your fingers, and up the wrists.  · Rub until the gel is gone and your hands are completely dry.  Preventing the flu in healthcare settings  The flu is a special concern for people in hospitals and long-term care facilities. To help prevent the spread of flu, many hospitals and nursing homes take these steps:  · Healthcare providers wash their hands or use an alcohol-based hand  before and after treating each patient.  · People with the flu have private rooms and bathrooms or share a room with someone with the same infection.  · People who are at high risk for the flu but don't have it are encouraged to get the flu and pneumonia vaccines.  · All healthcare workers are encouraged or required to get flu shots.   Date Last Reviewed:  12/1/2016  © 1865-6275 [employer] [address] [place] [address], [place] [address]. All rights reserved. This information is not intended as a substitute for professional medical care. Always follow your healthcare professional's instructions.

## 2019-11-15 NOTE — PROGRESS NOTES
Subjective:      Patient ID: Alphonse Claude Mackey III is a 29 y.o. male.    Chief Complaint: Headache and Sore Throat    Fever    This is a new problem. The current episode started yesterday. The problem has been gradually worsening. The maximum temperature noted was 101 to 101.9 F. Associated symptoms include congestion, headaches, muscle aches and sleepiness. Pertinent negatives include no abdominal pain, chest pain, coughing, diarrhea, ear pain, nausea, rash, sore throat, urinary pain, vomiting or wheezing. Treatments tried: theraflu, tea. The treatment provided no relief.   Risk factors: no contaminated food, no contaminated water, no hx of cancer, no immunosuppression, no occupational exposure, no recent sickness, no recent travel and no sick contacts        Review of Systems   Constitutional: Positive for chills, diaphoresis and fever. Negative for activity change, appetite change, fatigue and unexpected weight change.   HENT: Positive for congestion, postnasal drip, rhinorrhea, sinus pressure and sinus pain. Negative for ear pain, hearing loss, sneezing, sore throat, trouble swallowing and voice change.    Eyes: Negative.  Negative for visual disturbance.   Respiratory: Negative.  Negative for cough, choking, chest tightness, shortness of breath and wheezing.    Cardiovascular: Negative for chest pain, palpitations and leg swelling.   Gastrointestinal: Negative for abdominal distention, abdominal pain, blood in stool, constipation, diarrhea, nausea and vomiting.   Endocrine: Negative for cold intolerance, heat intolerance, polydipsia and polyuria.   Genitourinary: Negative.  Negative for difficulty urinating, dysuria and frequency.   Musculoskeletal: Negative for arthralgias, back pain, gait problem, joint swelling and myalgias.   Skin: Negative for color change, pallor, rash and wound.   Neurological: Positive for headaches. Negative for dizziness, tremors, weakness, light-headedness and numbness.  "  Hematological: Negative for adenopathy.   Psychiatric/Behavioral: Negative for behavioral problems, confusion, self-injury, sleep disturbance and suicidal ideas. The patient is not nervous/anxious.      Objective:   /86 (BP Location: Right arm, Patient Position: Sitting, BP Method: Large (Manual))   Pulse 93   Temp (!) 101.6 °F (38.7 °C) (Tympanic)   Ht 5' 10" (1.778 m)   Wt 93.3 kg (205 lb 11.7 oz)   SpO2 98%   BMI 29.52 kg/m²     Physical Exam   Constitutional: He is oriented to person, place, and time. He appears well-developed and well-nourished. No distress.   HENT:   Head: Normocephalic and atraumatic.   Right Ear: Hearing, external ear and ear canal normal. Tympanic membrane is erythematous. A middle ear effusion (serous) is present.   Left Ear: Hearing, external ear and ear canal normal. Tympanic membrane is erythematous. A middle ear effusion (serous) is present.   Nose: Mucosal edema and rhinorrhea present. Right sinus exhibits maxillary sinus tenderness and frontal sinus tenderness. Left sinus exhibits maxillary sinus tenderness and frontal sinus tenderness.   Mouth/Throat: Uvula is midline and mucous membranes are normal. No oral lesions. No trismus in the jaw. No uvula swelling. Oropharyngeal exudate and posterior oropharyngeal erythema present. No posterior oropharyngeal edema.   Eyes: Pupils are equal, round, and reactive to light. Conjunctivae and EOM are normal.   Neck: Normal range of motion. Neck supple.   Cardiovascular: Normal rate, regular rhythm and normal heart sounds. Exam reveals no gallop and no friction rub.   No murmur heard.  Pulmonary/Chest: Effort normal and breath sounds normal. No stridor. No respiratory distress. He has no wheezes. He has no rales. He exhibits no tenderness.   Abdominal: Soft. He exhibits no distension. There is no tenderness.   Musculoskeletal: Normal range of motion.   Lymphadenopathy:     He has no cervical adenopathy.   Neurological: He is alert " and oriented to person, place, and time.   Skin: Skin is warm and dry. He is not diaphoretic.   Psychiatric: He has a normal mood and affect. His behavior is normal. Judgment and thought content normal.   Nursing note and vitals reviewed.    Office Visit on 11/15/2019   Component Date Value Ref Range Status    Influenza A, Molecular 11/15/2019 Negative  Negative Final    Influenza B, Molecular 11/15/2019 Positive* Negative Final    Flu A & B Source 11/15/2019 NP   Final    Rapid Strep A Screen 11/15/2019 Negative  Negative Final    See Micro for reflexed Strep culture.       Assessment:     1. Viral URI    2. Influenza B      Plan:   Viral URI  -     Influenza A & B by Molecular  -     Throat Screen, Rapid    Influenza B  -     oseltamivir (TAMIFLU) 75 MG capsule; Take 1 capsule (75 mg total) by mouth 2 (two) times daily. for 5 days  Dispense: 10 capsule; Refill: 0    Other orders  -     Strep A culture, throat    -cont theraflu  -tylenol/motrin  Educational handout on over-the-counter medications and at-home conservative care, pertinent to the patients diagnosis today, was handed to the patient and discussed in detail.    Follow up if symptoms worsen or fail to improve.

## 2019-11-18 ENCOUNTER — TELEPHONE (OUTPATIENT)
Dept: INTERNAL MEDICINE | Facility: CLINIC | Age: 29
End: 2019-11-18

## 2019-11-18 LAB — BACTERIA THROAT CULT: NORMAL

## 2019-11-18 NOTE — LETTER
November 18, 2019      [facility]  [address] [facility]  [place] [address]  Phone: [phone]  Fax: [phone]       Patient: Raj Willard   YOB: 1990  Date of Visit: 11/18/2019    To Whom It May Concern:    Vasquez Willard  was at [facility] on 11/18/2019. He may return to work/school on 11/19/2019 with no restrictions. If you have any questions or concerns, or if I can be of further assistance, please do not hesitate to contact me.    Sincerely,          Karen Diaz PA-C

## 2019-11-18 NOTE — TELEPHONE ENCOUNTER
----- Message from Billy Mccarthy sent at 11/18/2019 10:39 AM CST -----  Contact: Pt  Pt is  calling the staff regarding a return to work slip Pt asked that the staff let pt know when this slip can be picked up today.    PT CALL BACK [phone]    Thanks

## 2019-11-18 NOTE — TELEPHONE ENCOUNTER
----- Message from Michelle Ojeda sent at 11/18/2019  4:55 PM CST -----  Contact: Patient  Type:  Patient Returning Call    Who Called:  Patient  Who Left Message for Patient:  Chana  Does the patient know what this is regarding?: yes   Best Call Back Number:  [phone] (home)    Additional Information:  na

## 2019-11-18 NOTE — TELEPHONE ENCOUNTER
LVM for pt to return call regarding return to work note. I do not see letter in his chart. Will send message to provider.

## 2019-11-18 NOTE — TELEPHONE ENCOUNTER
Spoke with pt regarding RTW note. Pt asked for note to be emailed to [email or web address] if possible.

## 2020-12-10 ENCOUNTER — OFFICE VISIT (OUTPATIENT)
Dept: INTERNAL MEDICINE | Facility: CLINIC | Age: 30
End: 2020-12-10
Payer: COMMERCIAL

## 2020-12-10 ENCOUNTER — TELEPHONE (OUTPATIENT)
Dept: INTERNAL MEDICINE | Facility: CLINIC | Age: 30
End: 2020-12-10

## 2020-12-10 ENCOUNTER — LAB VISIT (OUTPATIENT)
Dept: LAB | Facility: HOSPITAL | Age: 30
End: 2020-12-10
Attending: FAMILY MEDICINE
Payer: COMMERCIAL

## 2020-12-10 VITALS
HEART RATE: 92 BPM | TEMPERATURE: 99 F | WEIGHT: 214.75 LBS | DIASTOLIC BLOOD PRESSURE: 82 MMHG | OXYGEN SATURATION: 98 % | SYSTOLIC BLOOD PRESSURE: 134 MMHG | HEIGHT: 70 IN | BODY MASS INDEX: 30.74 KG/M2 | RESPIRATION RATE: 18 BRPM

## 2020-12-10 DIAGNOSIS — Z11.59 ENCOUNTER FOR HEPATITIS C SCREENING TEST FOR LOW RISK PATIENT: ICD-10-CM

## 2020-12-10 DIAGNOSIS — Z00.00 ROUTINE PHYSICAL EXAMINATION: ICD-10-CM

## 2020-12-10 DIAGNOSIS — R79.89 LOW VITAMIN D LEVEL: ICD-10-CM

## 2020-12-10 DIAGNOSIS — Z00.00 ROUTINE PHYSICAL EXAMINATION: Primary | ICD-10-CM

## 2020-12-10 DIAGNOSIS — Z76.89 ESTABLISHING CARE WITH NEW DOCTOR, ENCOUNTER FOR: ICD-10-CM

## 2020-12-10 DIAGNOSIS — F41.9 ANXIETY: ICD-10-CM

## 2020-12-10 LAB
ALBUMIN SERPL BCP-MCNC: 4.7 G/DL (ref 3.5–5.2)
ALP SERPL-CCNC: 51 U/L (ref 55–135)
ALT SERPL W/O P-5'-P-CCNC: 21 U/L (ref 10–44)
ANION GAP SERPL CALC-SCNC: 12 MMOL/L (ref 8–16)
AST SERPL-CCNC: 22 U/L (ref 10–40)
BASOPHILS # BLD AUTO: 0.03 K/UL (ref 0–0.2)
BASOPHILS NFR BLD: 0.5 % (ref 0–1.9)
BILIRUB SERPL-MCNC: 1.3 MG/DL (ref 0.1–1)
BUN SERPL-MCNC: 12 MG/DL (ref 6–20)
CALCIUM SERPL-MCNC: 9.7 MG/DL (ref 8.7–10.5)
CHLORIDE SERPL-SCNC: 101 MMOL/L (ref 95–110)
CHOLEST SERPL-MCNC: 186 MG/DL (ref 120–199)
CHOLEST/HDLC SERPL: 3 {RATIO} (ref 2–5)
CO2 SERPL-SCNC: 26 MMOL/L (ref 23–29)
CREAT SERPL-MCNC: 1.2 MG/DL (ref 0.5–1.4)
DIFFERENTIAL METHOD: NORMAL
EOSINOPHIL # BLD AUTO: 0 K/UL (ref 0–0.5)
EOSINOPHIL NFR BLD: 0.5 % (ref 0–8)
ERYTHROCYTE [DISTWIDTH] IN BLOOD BY AUTOMATED COUNT: 11.5 % (ref 11.5–14.5)
EST. GFR  (AFRICAN AMERICAN): >60 ML/MIN/1.73 M^2
EST. GFR  (NON AFRICAN AMERICAN): >60 ML/MIN/1.73 M^2
GLUCOSE SERPL-MCNC: 91 MG/DL (ref 70–110)
HCT VFR BLD AUTO: 50 % (ref 40–54)
HDLC SERPL-MCNC: 61 MG/DL (ref 40–75)
HDLC SERPL: 32.8 % (ref 20–50)
HGB BLD-MCNC: 16.3 G/DL (ref 14–18)
IMM GRANULOCYTES # BLD AUTO: 0.02 K/UL (ref 0–0.04)
IMM GRANULOCYTES NFR BLD AUTO: 0.3 % (ref 0–0.5)
LDLC SERPL CALC-MCNC: 115 MG/DL (ref 63–159)
LYMPHOCYTES # BLD AUTO: 1.2 K/UL (ref 1–4.8)
LYMPHOCYTES NFR BLD: 19.8 % (ref 18–48)
MCH RBC QN AUTO: 29.7 PG (ref 27–31)
MCHC RBC AUTO-ENTMCNC: 32.6 G/DL (ref 32–36)
MCV RBC AUTO: 91 FL (ref 82–98)
MONOCYTES # BLD AUTO: 0.5 K/UL (ref 0.3–1)
MONOCYTES NFR BLD: 8.2 % (ref 4–15)
NEUTROPHILS # BLD AUTO: 4.4 K/UL (ref 1.8–7.7)
NEUTROPHILS NFR BLD: 70.7 % (ref 38–73)
NONHDLC SERPL-MCNC: 125 MG/DL
NRBC BLD-RTO: 0 /100 WBC
PLATELET # BLD AUTO: 257 K/UL (ref 150–350)
PMV BLD AUTO: 10 FL (ref 9.2–12.9)
POTASSIUM SERPL-SCNC: 3.7 MMOL/L (ref 3.5–5.1)
PROT SERPL-MCNC: 8.5 G/DL (ref 6–8.4)
RBC # BLD AUTO: 5.49 M/UL (ref 4.6–6.2)
SODIUM SERPL-SCNC: 139 MMOL/L (ref 136–145)
TRIGL SERPL-MCNC: 50 MG/DL (ref 30–150)
TSH SERPL DL<=0.005 MIU/L-ACNC: 1.41 UIU/ML (ref 0.4–4)
WBC # BLD AUTO: 6.21 K/UL (ref 3.9–12.7)

## 2020-12-10 PROCEDURE — 1126F AMNT PAIN NOTED NONE PRSNT: CPT | Mod: S$GLB,,, | Performed by: FAMILY MEDICINE

## 2020-12-10 PROCEDURE — 80053 COMPREHEN METABOLIC PANEL: CPT

## 2020-12-10 PROCEDURE — 84443 ASSAY THYROID STIM HORMONE: CPT

## 2020-12-10 PROCEDURE — 99395 PR PREVENTIVE VISIT,EST,18-39: ICD-10-PCS | Mod: S$GLB,,, | Performed by: FAMILY MEDICINE

## 2020-12-10 PROCEDURE — 36415 COLL VENOUS BLD VENIPUNCTURE: CPT

## 2020-12-10 PROCEDURE — 85025 COMPLETE CBC W/AUTO DIFF WBC: CPT

## 2020-12-10 PROCEDURE — 80061 LIPID PANEL: CPT

## 2020-12-10 PROCEDURE — 86703 HIV-1/HIV-2 1 RESULT ANTBDY: CPT

## 2020-12-10 PROCEDURE — 82306 VITAMIN D 25 HYDROXY: CPT

## 2020-12-10 PROCEDURE — 86803 HEPATITIS C AB TEST: CPT

## 2020-12-10 PROCEDURE — 1126F PR PAIN SEVERITY QUANTIFIED, NO PAIN PRESENT: ICD-10-PCS | Mod: S$GLB,,, | Performed by: FAMILY MEDICINE

## 2020-12-10 PROCEDURE — 99999 PR PBB SHADOW E&M-EST. PATIENT-LVL III: CPT | Mod: PBBFAC,,, | Performed by: FAMILY MEDICINE

## 2020-12-10 PROCEDURE — 99395 PREV VISIT EST AGE 18-39: CPT | Mod: S$GLB,,, | Performed by: FAMILY MEDICINE

## 2020-12-10 PROCEDURE — 3008F BODY MASS INDEX DOCD: CPT | Mod: CPTII,S$GLB,, | Performed by: FAMILY MEDICINE

## 2020-12-10 PROCEDURE — 3008F PR BODY MASS INDEX (BMI) DOCUMENTED: ICD-10-PCS | Mod: CPTII,S$GLB,, | Performed by: FAMILY MEDICINE

## 2020-12-10 PROCEDURE — 99999 PR PBB SHADOW E&M-EST. PATIENT-LVL III: ICD-10-PCS | Mod: PBBFAC,,, | Performed by: FAMILY MEDICINE

## 2020-12-10 NOTE — PROGRESS NOTES
Subjective:       Patient ID: Alphonse Claude Mackey III is a 30 y.o. male.    Chief Complaint: Establish Care    HPI Mr. Willard presents today to establish care and routine exam.     Randomly feels light headed    Sometimes has felt a pressure on chest     Recently exposed to COVID on a rich trip.   Rapid test on yesterday negative.     Review of Systems   Constitutional: Negative for activity change, appetite change, fatigue and fever.   HENT: Negative for congestion, ear pain, facial swelling, hearing loss, sore throat and tinnitus.    Eyes: Negative for redness and visual disturbance.   Respiratory: Negative for cough, chest tightness and wheezing.    Gastrointestinal: Negative for abdominal distention, abdominal pain, constipation, diarrhea, nausea and vomiting.   Endocrine: Negative for polydipsia and polyuria.   Genitourinary: Negative for discharge, flank pain and frequency.   Musculoskeletal: Negative for back pain, gait problem and joint swelling.   Skin: Negative for rash.   Neurological: Negative for dizziness, tremors, seizures, weakness and headaches.   Psychiatric/Behavioral: Negative for agitation and confusion. The patient is nervous/anxious.          Past Medical History:   Diagnosis Date    Anxiety      Past Surgical History:   Procedure Laterality Date    ADENOIDECTOMY      TONSILLECTOMY       Family History   Problem Relation Age of Onset    Obesity Paternal Grandmother     Cancer Paternal Grandfather      Social History     Socioeconomic History    Marital status: Single     Spouse name: Not on file    Number of children: Not on file    Years of education: Not on file    Highest education level: Not on file   Occupational History    Not on file   Social Needs    Financial resource strain: Not on file    Food insecurity     Worry: Not on file     Inability: Not on file    Transportation needs     Medical: Not on file     Non-medical: Not on file   Tobacco Use    Smoking status:  Never Smoker    Smokeless tobacco: Never Used   Substance and Sexual Activity    Alcohol use: No    Drug use: No    Sexual activity: Not Currently   Lifestyle    Physical activity     Days per week: Not on file     Minutes per session: Not on file    Stress: Not on file   Relationships    Social connections     Talks on phone: Not on file     Gets together: Not on file     Attends Yarsanism service: Not on file     Active member of club or organization: Not on file     Attends meetings of clubs or organizations: Not on file     Relationship status: Not on file   Other Topics Concern    Not on file   Social History Narrative    Not on file       Objective:        Physical Exam  Vitals signs reviewed.   Constitutional:       Appearance: Normal appearance. He is normal weight.   HENT:      Head: Normocephalic and atraumatic.      Right Ear: Tympanic membrane, ear canal and external ear normal.      Left Ear: Tympanic membrane, ear canal and external ear normal.   Eyes:      Extraocular Movements: Extraocular movements intact.      Pupils: Pupils are equal, round, and reactive to light.   Neck:      Musculoskeletal: Normal range of motion.   Cardiovascular:      Rate and Rhythm: Normal rate and regular rhythm.   Pulmonary:      Effort: Pulmonary effort is normal.   Musculoskeletal: Normal range of motion.   Neurological:      General: No focal deficit present.      Mental Status: He is alert and oriented to person, place, and time.   Psychiatric:         Mood and Affect: Mood normal.         Behavior: Behavior normal.           Assessment/Plan:     Routine physical examination  -     Hepatitis C Antibody; Future; Expected date: 12/10/2020  -     HIV 1/2 Ag/Ab (4th Gen); Future; Expected date: 12/10/2020  -     Comprehensive Metabolic Panel; Future; Expected date: 12/10/2020  -     CBC Auto Differential; Future; Expected date: 12/10/2020  -     Lipid Panel; Future; Expected date: 12/10/2020  -     TSH; Future;  Expected date: 12/10/2020    Encounter for hepatitis C screening test for low risk patient  -     Hepatitis C Antibody; Future; Expected date: 12/10/2020    Low vitamin D level  -     Vitamin D; Future; Expected date: 12/10/2020    Establishing care with new doctor, encounter for  Reviewed medical, social, surgical and family history.   Reviewed health maintenance  Pt declines vaccinations today     Anxiety  discussed anxiety. He use to take Effexor  Prays and meditates now and this helps  Will let us know if he would like to try anything as needed later          Follow up as needed    Porsche Jacome MD  ON   Family Medicine

## 2020-12-11 ENCOUNTER — PATIENT MESSAGE (OUTPATIENT)
Dept: INTERNAL MEDICINE | Facility: CLINIC | Age: 30
End: 2020-12-11

## 2020-12-11 LAB
25(OH)D3+25(OH)D2 SERPL-MCNC: 21 NG/ML (ref 30–96)
HCV AB SERPL QL IA: NEGATIVE
HIV 1+2 AB+HIV1 P24 AG SERPL QL IA: NEGATIVE

## 2021-04-09 ENCOUNTER — OFFICE VISIT (OUTPATIENT)
Dept: INTERNAL MEDICINE | Facility: CLINIC | Age: 31
End: 2021-04-09
Payer: COMMERCIAL

## 2021-04-09 ENCOUNTER — TELEPHONE (OUTPATIENT)
Dept: INTERNAL MEDICINE | Facility: CLINIC | Age: 31
End: 2021-04-09

## 2021-04-09 VITALS
SYSTOLIC BLOOD PRESSURE: 120 MMHG | TEMPERATURE: 99 F | WEIGHT: 213.88 LBS | HEART RATE: 100 BPM | BODY MASS INDEX: 30.62 KG/M2 | OXYGEN SATURATION: 98 % | HEIGHT: 70 IN | DIASTOLIC BLOOD PRESSURE: 96 MMHG

## 2021-04-09 DIAGNOSIS — R03.0 ELEVATED BLOOD PRESSURE READING WITHOUT DIAGNOSIS OF HYPERTENSION: ICD-10-CM

## 2021-04-09 DIAGNOSIS — R51.9 PRESSURE IN HEAD: Primary | ICD-10-CM
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
## 2021-04-09 PROCEDURE — 1126F PR PAIN SEVERITY QUANTIFIED, NO PAIN PRESENT: ICD-10-PCS | Mod: S$GLB,,, | Performed by: INTERNAL MEDICINE

## 2021-04-09 PROCEDURE — 99999 PR PBB SHADOW E&M-EST. PATIENT-LVL III: ICD-10-PCS | Mod: PBBFAC,,, | Performed by: INTERNAL MEDICINE

## 2021-04-09 PROCEDURE — 3008F BODY MASS INDEX DOCD: CPT | Mod: CPTII,S$GLB,, | Performed by: INTERNAL MEDICINE

## 2021-04-09 PROCEDURE — 99999 PR PBB SHADOW E&M-EST. PATIENT-LVL III: CPT | Mod: PBBFAC,,, | Performed by: INTERNAL MEDICINE

## 2021-04-09 PROCEDURE — 3008F PR BODY MASS INDEX (BMI) DOCUMENTED: ICD-10-PCS | Mod: CPTII,S$GLB,, | Performed by: INTERNAL MEDICINE

## 2021-04-09 PROCEDURE — 99214 PR OFFICE/OUTPT VISIT, EST, LEVL IV, 30-39 MIN: ICD-10-PCS | Mod: S$GLB,,, | Performed by: INTERNAL MEDICINE

## 2021-04-09 PROCEDURE — 1126F AMNT PAIN NOTED NONE PRSNT: CPT | Mod: S$GLB,,, | Performed by: INTERNAL MEDICINE

## 2021-04-09 PROCEDURE — 99214 OFFICE O/P EST MOD 30 MIN: CPT | Mod: S$GLB,,, | Performed by: INTERNAL MEDICINE

## 2021-04-28 ENCOUNTER — PATIENT MESSAGE (OUTPATIENT)
Dept: RESEARCH | Facility: HOSPITAL | Age: 31
End: 2021-04-28

## 2021-06-30 ENCOUNTER — TELEPHONE (OUTPATIENT)
Dept: INTERNAL MEDICINE | Facility: CLINIC | Age: 31
End: 2021-06-30

## 2021-07-01 ENCOUNTER — PATIENT MESSAGE (OUTPATIENT)
Dept: ADMINISTRATIVE | Facility: OTHER | Age: 31
End: 2021-07-01

## 2021-07-29 ENCOUNTER — OFFICE VISIT (OUTPATIENT)
Dept: INTERNAL MEDICINE | Facility: CLINIC | Age: 31
End: 2021-07-29
Payer: COMMERCIAL

## 2021-07-29 VITALS
OXYGEN SATURATION: 97 % | BODY MASS INDEX: 30.77 KG/M2 | WEIGHT: 214.94 LBS | HEIGHT: 70 IN | HEART RATE: 90 BPM | SYSTOLIC BLOOD PRESSURE: 128 MMHG | TEMPERATURE: 98 F | DIASTOLIC BLOOD PRESSURE: 86 MMHG

## 2021-07-29 DIAGNOSIS — J06.9 VIRAL URI WITH COUGH: Primary | ICD-10-CM

## 2021-07-29 PROCEDURE — 3079F PR MOST RECENT DIASTOLIC BLOOD PRESSURE 80-89 MM HG: ICD-10-PCS | Mod: CPTII,S$GLB,, | Performed by: PHYSICIAN ASSISTANT

## 2021-07-29 PROCEDURE — 1160F RVW MEDS BY RX/DR IN RCRD: CPT | Mod: CPTII,S$GLB,, | Performed by: PHYSICIAN ASSISTANT

## 2021-07-29 PROCEDURE — 99999 PR PBB SHADOW E&M-EST. PATIENT-LVL III: ICD-10-PCS | Mod: PBBFAC,,, | Performed by: PHYSICIAN ASSISTANT

## 2021-07-29 PROCEDURE — 1160F PR REVIEW ALL MEDS BY PRESCRIBER/CLIN PHARMACIST DOCUMENTED: ICD-10-PCS | Mod: CPTII,S$GLB,, | Performed by: PHYSICIAN ASSISTANT

## 2021-07-29 PROCEDURE — 1126F AMNT PAIN NOTED NONE PRSNT: CPT | Mod: CPTII,S$GLB,, | Performed by: PHYSICIAN ASSISTANT

## 2021-07-29 PROCEDURE — 3079F DIAST BP 80-89 MM HG: CPT | Mod: CPTII,S$GLB,, | Performed by: PHYSICIAN ASSISTANT

## 2021-07-29 PROCEDURE — 1159F PR MEDICATION LIST DOCUMENTED IN MEDICAL RECORD: ICD-10-PCS | Mod: CPTII,S$GLB,, | Performed by: PHYSICIAN ASSISTANT

## 2021-07-29 PROCEDURE — 1159F MED LIST DOCD IN RCRD: CPT | Mod: CPTII,S$GLB,, | Performed by: PHYSICIAN ASSISTANT

## 2021-07-29 PROCEDURE — 3008F PR BODY MASS INDEX (BMI) DOCUMENTED: ICD-10-PCS | Mod: CPTII,S$GLB,, | Performed by: PHYSICIAN ASSISTANT

## 2021-07-29 PROCEDURE — 3008F BODY MASS INDEX DOCD: CPT | Mod: CPTII,S$GLB,, | Performed by: PHYSICIAN ASSISTANT

## 2021-07-29 PROCEDURE — 3074F SYST BP LT 130 MM HG: CPT | Mod: CPTII,S$GLB,, | Performed by: PHYSICIAN ASSISTANT

## 2021-07-29 PROCEDURE — 99999 PR PBB SHADOW E&M-EST. PATIENT-LVL III: CPT | Mod: PBBFAC,,, | Performed by: PHYSICIAN ASSISTANT

## 2021-07-29 PROCEDURE — 96372 PR INJECTION,THERAP/PROPH/DIAG2ST, IM OR SUBCUT: ICD-10-PCS | Mod: S$GLB,,, | Performed by: PHYSICIAN ASSISTANT

## 2021-07-29 PROCEDURE — 1126F PR PAIN SEVERITY QUANTIFIED, NO PAIN PRESENT: ICD-10-PCS | Mod: CPTII,S$GLB,, | Performed by: PHYSICIAN ASSISTANT

## 2021-07-29 PROCEDURE — 99214 PR OFFICE/OUTPT VISIT, EST, LEVL IV, 30-39 MIN: ICD-10-PCS | Mod: 25,S$GLB,, | Performed by: PHYSICIAN ASSISTANT

## 2021-07-29 PROCEDURE — 3074F PR MOST RECENT SYSTOLIC BLOOD PRESSURE < 130 MM HG: ICD-10-PCS | Mod: CPTII,S$GLB,, | Performed by: PHYSICIAN ASSISTANT

## 2021-07-29 PROCEDURE — 96372 THER/PROPH/DIAG INJ SC/IM: CPT | Mod: S$GLB,,, | Performed by: PHYSICIAN ASSISTANT

## 2021-07-29 PROCEDURE — 99214 OFFICE O/P EST MOD 30 MIN: CPT | Mod: 25,S$GLB,, | Performed by: PHYSICIAN ASSISTANT

## 2021-07-29 RX ORDER — METHYLPREDNISOLONE ACETATE 80 MG/ML
80 INJECTION, SUSPENSION INTRA-ARTICULAR; INTRALESIONAL; INTRAMUSCULAR; SOFT TISSUE ONCE
Status: COMPLETED | OUTPATIENT
Start: 2021-07-29 | End: 2021-07-29

## 2021-07-29 RX ADMIN — METHYLPREDNISOLONE ACETATE 80 MG: 80 INJECTION, SUSPENSION INTRA-ARTICULAR; INTRALESIONAL; INTRAMUSCULAR; SOFT TISSUE at 09:07

## 2021-11-11 ENCOUNTER — OFFICE VISIT (OUTPATIENT)
Dept: INTERNAL MEDICINE | Facility: CLINIC | Age: 31
End: 2021-11-11
Payer: COMMERCIAL

## 2021-11-11 VITALS
SYSTOLIC BLOOD PRESSURE: 134 MMHG | WEIGHT: 219.38 LBS | HEIGHT: 70 IN | BODY MASS INDEX: 31.41 KG/M2 | TEMPERATURE: 98 F | HEART RATE: 79 BPM | OXYGEN SATURATION: 98 % | DIASTOLIC BLOOD PRESSURE: 86 MMHG | RESPIRATION RATE: 19 BRPM

## 2021-11-11 DIAGNOSIS — S16.1XXA STRAIN OF NECK MUSCLE, INITIAL ENCOUNTER: Primary | ICD-10-CM

## 2021-11-11 PROCEDURE — 99213 PR OFFICE/OUTPT VISIT, EST, LEVL III, 20-29 MIN: ICD-10-PCS | Mod: S$GLB,,, | Performed by: INTERNAL MEDICINE

## 2021-11-11 PROCEDURE — 99213 OFFICE O/P EST LOW 20 MIN: CPT | Mod: S$GLB,,, | Performed by: INTERNAL MEDICINE

## 2021-11-11 PROCEDURE — 3008F PR BODY MASS INDEX (BMI) DOCUMENTED: ICD-10-PCS | Mod: CPTII,S$GLB,, | Performed by: INTERNAL MEDICINE

## 2021-11-11 PROCEDURE — 3075F SYST BP GE 130 - 139MM HG: CPT | Mod: CPTII,S$GLB,, | Performed by: INTERNAL MEDICINE

## 2021-11-11 PROCEDURE — 3075F PR MOST RECENT SYSTOLIC BLOOD PRESS GE 130-139MM HG: ICD-10-PCS | Mod: CPTII,S$GLB,, | Performed by: INTERNAL MEDICINE

## 2021-11-11 PROCEDURE — 3079F PR MOST RECENT DIASTOLIC BLOOD PRESSURE 80-89 MM HG: ICD-10-PCS | Mod: CPTII,S$GLB,, | Performed by: INTERNAL MEDICINE

## 2021-11-11 PROCEDURE — 99999 PR PBB SHADOW E&M-EST. PATIENT-LVL III: ICD-10-PCS | Mod: PBBFAC,,, | Performed by: INTERNAL MEDICINE

## 2021-11-11 PROCEDURE — 99999 PR PBB SHADOW E&M-EST. PATIENT-LVL III: CPT | Mod: PBBFAC,,, | Performed by: INTERNAL MEDICINE

## 2021-11-11 PROCEDURE — 3008F BODY MASS INDEX DOCD: CPT | Mod: CPTII,S$GLB,, | Performed by: INTERNAL MEDICINE

## 2021-11-11 PROCEDURE — 3079F DIAST BP 80-89 MM HG: CPT | Mod: CPTII,S$GLB,, | Performed by: INTERNAL MEDICINE

## 2021-11-11 RX ORDER — METHYLPREDNISOLONE 4 MG/1
TABLET ORAL
Qty: 1 EACH | Refills: 0 | Status: SHIPPED | OUTPATIENT
Start: 2021-11-11 | End: 2022-01-04

## 2021-11-11 RX ORDER — CYCLOBENZAPRINE HCL 10 MG
TABLET ORAL
Qty: 30 TABLET | Refills: 0 | Status: SHIPPED | OUTPATIENT
Start: 2021-11-11 | End: 2022-01-07

## 2021-11-11 RX ORDER — GLUCOSAMINE/CHONDRO SU A 500-400 MG
1 TABLET ORAL 3 TIMES DAILY
COMMUNITY
End: 2022-03-22

## 2021-11-11 RX ORDER — IBUPROFEN 200 MG
200 TABLET ORAL EVERY 6 HOURS PRN
COMMUNITY
End: 2022-03-22

## 2021-11-26 ENCOUNTER — PATIENT MESSAGE (OUTPATIENT)
Dept: INTERNAL MEDICINE | Facility: CLINIC | Age: 31
End: 2021-11-26
Payer: COMMERCIAL

## 2021-11-26 DIAGNOSIS — G89.29 CHRONIC NECK PAIN: Primary | ICD-10-CM

## 2021-11-26 DIAGNOSIS — M54.2 CHRONIC NECK PAIN: Primary | ICD-10-CM

## 2021-12-07 ENCOUNTER — OFFICE VISIT (OUTPATIENT)
Dept: INTERNAL MEDICINE | Facility: CLINIC | Age: 31
End: 2021-12-07
Payer: COMMERCIAL

## 2021-12-07 ENCOUNTER — HOSPITAL ENCOUNTER (OUTPATIENT)
Dept: RADIOLOGY | Facility: HOSPITAL | Age: 31
Discharge: HOME | End: 2021-12-07
Attending: INTERNAL MEDICINE
Payer: COMMERCIAL

## 2021-12-07 VITALS
BODY MASS INDEX: 31.53 KG/M2 | DIASTOLIC BLOOD PRESSURE: 96 MMHG | HEART RATE: 116 BPM | WEIGHT: 220.25 LBS | SYSTOLIC BLOOD PRESSURE: 142 MMHG | OXYGEN SATURATION: 99 % | TEMPERATURE: 97 F | HEIGHT: 70 IN

## 2021-12-07 DIAGNOSIS — M54.2 CHRONIC NECK PAIN: ICD-10-CM

## 2021-12-07 DIAGNOSIS — G89.29 CHRONIC NECK PAIN: ICD-10-CM

## 2021-12-07 DIAGNOSIS — M54.12 CERVICAL RADICULITIS: ICD-10-CM

## 2021-12-07 DIAGNOSIS — S16.1XXA STRAIN OF NECK MUSCLE, INITIAL ENCOUNTER: Primary | ICD-10-CM

## 2021-12-07 PROCEDURE — 99214 PR OFFICE/OUTPT VISIT, EST, LEVL IV, 30-39 MIN: ICD-10-PCS | Mod: S$GLB,,, | Performed by: INTERNAL MEDICINE

## 2021-12-07 PROCEDURE — 99214 OFFICE O/P EST MOD 30 MIN: CPT | Mod: S$GLB,,, | Performed by: INTERNAL MEDICINE

## 2021-12-07 PROCEDURE — 72040 XR CERVICAL SPINE AP LATERAL: ICD-10-PCS | Mod: 26,,, | Performed by: RADIOLOGY

## 2021-12-07 PROCEDURE — 99999 PR PBB SHADOW E&M-EST. PATIENT-LVL IV: CPT | Mod: PBBFAC,,, | Performed by: INTERNAL MEDICINE

## 2021-12-07 PROCEDURE — 99999 PR PBB SHADOW E&M-EST. PATIENT-LVL IV: ICD-10-PCS | Mod: PBBFAC,,, | Performed by: INTERNAL MEDICINE

## 2021-12-07 PROCEDURE — 72040 X-RAY EXAM NECK SPINE 2-3 VW: CPT | Mod: 26,,, | Performed by: RADIOLOGY

## 2021-12-07 PROCEDURE — 72040 X-RAY EXAM NECK SPINE 2-3 VW: CPT | Mod: TC

## 2021-12-07 RX ORDER — NAPROXEN 500 MG/1
500 TABLET ORAL 2 TIMES DAILY WITH MEALS
Qty: 15 TABLET | Refills: 0 | Status: SHIPPED | OUTPATIENT
Start: 2021-12-07 | End: 2022-01-07

## 2021-12-07 RX ORDER — CYCLOBENZAPRINE HCL 10 MG
TABLET ORAL
Qty: 30 TABLET | Refills: 0 | Status: SHIPPED | OUTPATIENT
Start: 2021-12-07 | End: 2022-01-07

## 2021-12-07 RX ORDER — GABAPENTIN 100 MG/1
100 CAPSULE ORAL NIGHTLY
Qty: 30 CAPSULE | Refills: 0 | Status: SHIPPED | OUTPATIENT
Start: 2021-12-07 | End: 2022-01-07

## 2021-12-10 ENCOUNTER — CLINICAL SUPPORT (OUTPATIENT)
Dept: REHABILITATION | Facility: HOSPITAL | Age: 31
End: 2021-12-10
Attending: INTERNAL MEDICINE
Payer: COMMERCIAL

## 2021-12-10 DIAGNOSIS — M54.2 CERVICAL PAIN: ICD-10-CM

## 2021-12-10 DIAGNOSIS — S16.1XXA STRAIN OF NECK MUSCLE, INITIAL ENCOUNTER: ICD-10-CM

## 2021-12-10 DIAGNOSIS — M54.12 CERVICAL RADICULITIS: ICD-10-CM

## 2021-12-10 PROCEDURE — 97161 PT EVAL LOW COMPLEX 20 MIN: CPT

## 2021-12-10 PROCEDURE — 97110 THERAPEUTIC EXERCISES: CPT

## 2021-12-21 ENCOUNTER — CLINICAL SUPPORT (OUTPATIENT)
Dept: REHABILITATION | Facility: HOSPITAL | Age: 31
End: 2021-12-21
Payer: COMMERCIAL

## 2021-12-21 DIAGNOSIS — M54.2 CERVICAL PAIN: ICD-10-CM

## 2021-12-21 PROCEDURE — 97110 THERAPEUTIC EXERCISES: CPT

## 2021-12-23 ENCOUNTER — CLINICAL SUPPORT (OUTPATIENT)
Dept: REHABILITATION | Facility: HOSPITAL | Age: 31
End: 2021-12-23
Payer: COMMERCIAL

## 2021-12-23 DIAGNOSIS — M54.2 CERVICAL PAIN: ICD-10-CM

## 2021-12-23 PROCEDURE — 97110 THERAPEUTIC EXERCISES: CPT

## 2021-12-23 PROCEDURE — 97140 MANUAL THERAPY 1/> REGIONS: CPT

## 2021-12-28 ENCOUNTER — CLINICAL SUPPORT (OUTPATIENT)
Dept: REHABILITATION | Facility: HOSPITAL | Age: 31
End: 2021-12-28
Payer: COMMERCIAL

## 2021-12-28 DIAGNOSIS — M54.2 CERVICAL PAIN: ICD-10-CM

## 2021-12-28 PROCEDURE — 97110 THERAPEUTIC EXERCISES: CPT

## 2021-12-28 PROCEDURE — 97140 MANUAL THERAPY 1/> REGIONS: CPT

## 2022-01-04 ENCOUNTER — PATIENT MESSAGE (OUTPATIENT)
Dept: INTERNAL MEDICINE | Facility: CLINIC | Age: 32
End: 2022-01-04

## 2022-01-04 ENCOUNTER — HOSPITAL ENCOUNTER (OUTPATIENT)
Dept: CARDIOLOGY | Facility: HOSPITAL | Age: 32
Discharge: HOME | End: 2022-01-04
Attending: INTERNAL MEDICINE
Payer: COMMERCIAL

## 2022-01-04 ENCOUNTER — CLINICAL SUPPORT (OUTPATIENT)
Dept: REHABILITATION | Facility: HOSPITAL | Age: 32
End: 2022-01-04
Payer: COMMERCIAL

## 2022-01-04 ENCOUNTER — OFFICE VISIT (OUTPATIENT)
Dept: INTERNAL MEDICINE | Facility: CLINIC | Age: 32
End: 2022-01-04
Payer: COMMERCIAL

## 2022-01-04 VITALS
WEIGHT: 219.81 LBS | BODY MASS INDEX: 31.54 KG/M2 | HEART RATE: 99 BPM | DIASTOLIC BLOOD PRESSURE: 101 MMHG | TEMPERATURE: 98 F | OXYGEN SATURATION: 99 % | SYSTOLIC BLOOD PRESSURE: 139 MMHG

## 2022-01-04 DIAGNOSIS — Z23 NEED FOR DIPHTHERIA-TETANUS-PERTUSSIS (TDAP) VACCINE: ICD-10-CM

## 2022-01-04 DIAGNOSIS — R42 LIGHT HEADED: ICD-10-CM

## 2022-01-04 DIAGNOSIS — Z00.00 ROUTINE GENERAL MEDICAL EXAMINATION AT A HEALTH CARE FACILITY: Primary | ICD-10-CM

## 2022-01-04 DIAGNOSIS — F41.9 ANXIETY: ICD-10-CM

## 2022-01-04 DIAGNOSIS — M54.2 CERVICAL PAIN: ICD-10-CM

## 2022-01-04 PROCEDURE — 3075F PR MOST RECENT SYSTOLIC BLOOD PRESS GE 130-139MM HG: ICD-10-PCS | Mod: CPTII,S$GLB,, | Performed by: INTERNAL MEDICINE

## 2022-01-04 PROCEDURE — 97140 MANUAL THERAPY 1/> REGIONS: CPT

## 2022-01-04 PROCEDURE — 97110 THERAPEUTIC EXERCISES: CPT

## 2022-01-04 PROCEDURE — 99395 PR PREVENTIVE VISIT,EST,18-39: ICD-10-PCS | Mod: 25,S$GLB,, | Performed by: INTERNAL MEDICINE

## 2022-01-04 PROCEDURE — 90715 TDAP VACCINE GREATER THAN OR EQUAL TO 7YO IM: ICD-10-PCS | Mod: S$GLB,,, | Performed by: INTERNAL MEDICINE

## 2022-01-04 PROCEDURE — 99999 PR PBB SHADOW E&M-EST. PATIENT-LVL IV: ICD-10-PCS | Mod: PBBFAC,,, | Performed by: INTERNAL MEDICINE

## 2022-01-04 PROCEDURE — 3080F DIAST BP >= 90 MM HG: CPT | Mod: CPTII,S$GLB,, | Performed by: INTERNAL MEDICINE

## 2022-01-04 PROCEDURE — 3008F PR BODY MASS INDEX (BMI) DOCUMENTED: ICD-10-PCS | Mod: CPTII,S$GLB,, | Performed by: INTERNAL MEDICINE

## 2022-01-04 PROCEDURE — 3075F SYST BP GE 130 - 139MM HG: CPT | Mod: CPTII,S$GLB,, | Performed by: INTERNAL MEDICINE

## 2022-01-04 PROCEDURE — 90471 IMMUNIZATION ADMIN: CPT | Mod: S$GLB,,, | Performed by: INTERNAL MEDICINE

## 2022-01-04 PROCEDURE — 3080F PR MOST RECENT DIASTOLIC BLOOD PRESSURE >= 90 MM HG: ICD-10-PCS | Mod: CPTII,S$GLB,, | Performed by: INTERNAL MEDICINE

## 2022-01-04 PROCEDURE — 99999 PR PBB SHADOW E&M-EST. PATIENT-LVL IV: CPT | Mod: PBBFAC,,, | Performed by: INTERNAL MEDICINE

## 2022-01-04 PROCEDURE — 99395 PREV VISIT EST AGE 18-39: CPT | Mod: 25,S$GLB,, | Performed by: INTERNAL MEDICINE

## 2022-01-04 PROCEDURE — 93010 ELECTROCARDIOGRAM REPORT: CPT | Mod: ,,, | Performed by: INTERNAL MEDICINE

## 2022-01-04 PROCEDURE — 93005 ELECTROCARDIOGRAM TRACING: CPT

## 2022-01-04 PROCEDURE — 93010 EKG 12-LEAD: ICD-10-PCS | Mod: ,,, | Performed by: INTERNAL MEDICINE

## 2022-01-04 PROCEDURE — 90471 TDAP VACCINE GREATER THAN OR EQUAL TO 7YO IM: ICD-10-PCS | Mod: S$GLB,,, | Performed by: INTERNAL MEDICINE

## 2022-01-04 PROCEDURE — 3008F BODY MASS INDEX DOCD: CPT | Mod: CPTII,S$GLB,, | Performed by: INTERNAL MEDICINE

## 2022-01-04 PROCEDURE — 90715 TDAP VACCINE 7 YRS/> IM: CPT | Mod: S$GLB,,, | Performed by: INTERNAL MEDICINE

## 2022-01-04 NOTE — PROGRESS NOTES
....[facility]   Physical Therapy Treatment Note     Name: Alphonse Claude Mackey III  Clinic Number: [id number]    Therapy Diagnosis:   Encounter Diagnosis   Name Primary?    Cervical pain      Physician: Claus Goodman MD    Visit Date: 1/4/2022      Physician Orders: PT Eval and Treat   Medical Diagnosis from Referral:   S16.1XXA (ICD-10-CM) - Strain of neck muscle, initial encounter   M54.12 (ICD-10-CM) - Cervical radiculitis      Evaluation Date: 12/10/2021  Authorization Period Expiration: 12/31/21  Plan of Care Expiration: 1/21/22  Visit # / Visits authorized: 1/4 (4 visits completed in 2021 including eval)  FOTO: 2/3        PTA Visit #: 0/5     Time In: 7:54  Time Out: 8:43  Total Billable Time:49  minutes    SUBJECTIVE     Pt reports: he is feeling better, he reports pain when he is in the prone position and looks up to read. He also reports stretching/ pulling with exercises and stretches  He was compliant with home exercise program.  Response to previous treatment: good  Functional change: more aware of his posture    Pain: 0/10  Location: left neck / L shoulder     OBJECTIVE     Cervical rotation B 80 degrees    Treatment     Raj received the treatments listed below:      therapeutic exercises to develop strength, endurance, ROM, flexibility, posture and core stabilization for 41 minutes including:  UBE 8' for upright posture, endurance, strengthening   Levator scapuae stretch B 3 x 20 secs   Rhomboid stretch 3 x 20 secs  MFT rows 40# 2 x 20 reps  MFT pull downs 70# 2 x 20 reps  MFT lateral raises 40# 2 x 20 reps  Diagonals with B TB 2 x 20 reps B not performed today  Chest pulls with B TB 2 x 20 rep not performed today   push ups on plinth 2 x 20 reps  HEP reviewed & posture reviewed including ergonomics  Wall angels 2 x 15 reps  rebounder overhead with not performed today  ER with band with B TB 2 x 15 reps  Prone thumbs up 2 x 15 reps more challenging 2#  Prone  thumbs down 2 x 15 reps 2#  Cervical extension with towel reviewed 1 x 10 reps d/c next visit  Corner wall stretch 3 x 20 secs    Manual therapy to neck which included TP & STM,  scapula mobs, suboccipital release, ROM & stretching  x 8 min     Patient Education and Home Exercises     Home Exercises Provided and Patient Education Provided     Education provided: yes, posture reviewed and ergonomics, instructed to perform rhomboid stretch and levator scapula stretch in the am      Written Home Exercises Provided: Patient instructed to cont prior HEP. Exercises were reviewed and Raj was able to demonstrate them prior to the end of the session.  Raj demonstrated good  understanding of the education provided. See EMR under Patient Instructions for exercises provided during therapy sessions    ASSESSMENT   Patient tolerated treatment well and is progressing with exercises and weights. He reported no increase in pain during exercises today. He tolerated manual therapy well today as well with ROM improving and decrease pain complaints.     Raj Is progressing well towards his goals.   Pt prognosis is Excellent.     Pt will continue to benefit from skilled outpatient physical therapy to address the deficits listed in the problem list box on initial evaluation, provide pt/family education and to maximize pt's level of independence in the home and community environment.     Pt's spiritual, cultural and educational needs considered and pt agreeable to plan of care and goals.     Anticipated barriers to physical therapy: none    Goals:  Short Term Goals: 2 weeks      1. Patient will be independent with 50% of HEP Met     LTG's 6 weeks  1. Patient will improve FOTO disability score  to 14 %  disability or less in order to improve overall QOL & return to PRIOR LEVEL OF FUNCTION  2. Patient will report no pain or tightness in neck and shoulder musculature  3. Patient will be more aware of his posture during the work  day and apply PT recommendations   4. Improve cervical rotation to the L by at least 5 degrees with no complaints of pain Met  5. Patient will be able to transition from R SL with no difficulty or pain        Plan   Plan of care Certification: 12/10/2021 to 1/21/22     Outpatient Physical Therapy 2 times weekly for 6 weeks to include the following interventions: Cervical/Lumbar Traction, Electrical Stimulation PRN, Manual Therapy, Moist Heat/ Ice, Patient Education, Self Care, Therapeutic Activites, Therapeutic Exercise and Ultrasound, ASTYM, Kinesiotaping PRN, Functional Dry Needling       Nithya Gatica, PT

## 2022-01-04 NOTE — PROGRESS NOTES
Subjective:      Patient ID: Alphonse Claude Mackey III is a 31 y.o. male.    Chief Complaint: Annual Exam    HPI     30 yo with   Patient Active Problem List   Diagnosis    Panic disorder without agoraphobia with mild panic attacks    Prolapsed internal hemorrhoids, grade 2    Cervical pain    Primary hypertension    Tachycardia, paroxysmal    Other chest pain    EKG abnormalities     Past Medical History:   Diagnosis Date    Anxiety     Primary hypertension 1/6/2022     Here today for annual prev exam.  Compliant with meds without significant side effects. Energy and appetite are good.     Declines covid and flu vaccine.     Has had chronic light headed feelings. No syncope. No typically exertional. No room spinning.     Admits to some anxiety/worry. Denies depressed mood. Declines meds.     family history includes Cancer in his paternal grandfather; Obesity in his paternal grandmother.  Past Surgical History:   Procedure Laterality Date    ADENOIDECTOMY      TONSILLECTOMY       Social History     Socioeconomic History    Marital status: Single   Tobacco Use    Smoking status: Never Smoker    Smokeless tobacco: Never Used   Substance and Sexual Activity    Alcohol use: Never    Drug use: Never    Sexual activity: Not Currently       Review of Systems   Constitutional: Negative for activity change and unexpected weight change.   HENT: Negative for hearing loss, rhinorrhea and trouble swallowing.    Eyes: Negative for discharge and visual disturbance.   Respiratory: Negative for chest tightness and wheezing.    Cardiovascular: Negative for chest pain and palpitations.   Gastrointestinal: Negative for blood in stool, constipation, diarrhea and vomiting.   Endocrine: Negative for polyuria.   Genitourinary: Negative for difficulty urinating, hematuria and urgency.   Musculoskeletal: Negative for arthralgias, joint swelling and neck pain.   Skin: Negative for rash and wound.   Neurological: Negative for  weakness and headaches.   Psychiatric/Behavioral: Negative for confusion, dysphoric mood, hallucinations and suicidal ideas. The patient is nervous/anxious.      Objective:   BP (!) 139/101   Pulse 99   Temp 97.7 °F (36.5 °C) (Tympanic)   Wt 99.7 kg (219 lb 12.8 oz)   SpO2 99%   BMI 31.54 kg/m²     Physical Exam  Constitutional:       General: He is not in acute distress.     Appearance: He is well-developed and well-nourished.   HENT:      Head: Normocephalic and atraumatic.      Right Ear: Tympanic membrane normal.      Left Ear: Tympanic membrane normal.      Mouth/Throat:      Mouth: Oropharynx is clear and moist. Mucous membranes are moist.      Pharynx: Oropharynx is clear.   Eyes:      Extraocular Movements: EOM normal.      Pupils: Pupils are equal, round, and reactive to light.   Neck:      Thyroid: No thyromegaly.   Cardiovascular:      Rate and Rhythm: Normal rate and regular rhythm.   Pulmonary:      Breath sounds: Normal breath sounds. No wheezing or rales.   Abdominal:      General: Bowel sounds are normal.      Palpations: Abdomen is soft.      Tenderness: There is no abdominal tenderness.   Musculoskeletal:         General: No swelling or tenderness.      Cervical back: Neck supple. No rigidity.   Lymphadenopathy:      Cervical: No cervical adenopathy.   Skin:     General: Skin is warm and dry.   Neurological:      General: No focal deficit present.      Mental Status: He is alert and oriented to person, place, and time.      Motor: No weakness.      Gait: Gait normal.   Psychiatric:         Mood and Affect: Mood and affect and mood normal.         Behavior: Behavior normal.         Assessment:     1. Routine general medical examination at a health care facility    2. Need for diphtheria-tetanus-pertussis (Tdap) vaccine    3. Light headed    4. Anxiety      Plan:   Routine general medical examination at a health care facility  Heart healthy diet and reg exercise  HM reviewed  -     Comprehensive  Metabolic Panel; Future; Expected date: 01/04/2022  -     CBC Auto Differential; Future; Expected date: 01/04/2022  -     TSH; Future; Expected date: 01/04/2022  -     Lipid Panel; Future; Expected date: 01/04/2022    Need for diphtheria-tetanus-pertussis (Tdap) vaccine  -     (In Office Administered) Tdap Vaccine    Light headed  -     EKG 12-lead; Future    Anxiety  -     Ambulatory referral/consult to Psychiatry; Future; Expected date: 01/11/2022        Lab Frequency Next Occurrence   Ambulatory referral/consult to Physical/Occupational Therapy Once 12/06/2021       Problem List Items Addressed This Visit    None     Visit Diagnoses     Routine general medical examination at a health care facility    -  Primary    Relevant Orders    Comprehensive Metabolic Panel (Completed)    CBC Auto Differential (Completed)    TSH (Completed)    Lipid Panel (Completed)    Need for diphtheria-tetanus-pertussis (Tdap) vaccine        Relevant Orders    (In Office Administered) Tdap Vaccine (Completed)    Light headed        Relevant Orders    EKG 12-lead (Completed)    Anxiety        Relevant Orders    Ambulatory referral/consult to Psychiatry          Follow up in about 4 weeks (around 2/1/2022), or if symptoms worsen or fail to improve.

## 2022-01-05 ENCOUNTER — TELEPHONE (OUTPATIENT)
Dept: INTERNAL MEDICINE | Facility: CLINIC | Age: 32
End: 2022-01-05
Payer: COMMERCIAL

## 2022-01-05 DIAGNOSIS — R94.31 ABNORMAL EKG: ICD-10-CM

## 2022-01-05 DIAGNOSIS — R42 LIGHT HEADED: Primary | ICD-10-CM

## 2022-01-06 ENCOUNTER — OFFICE VISIT (OUTPATIENT)
Dept: CARDIOLOGY | Facility: CLINIC | Age: 32
End: 2022-01-06
Payer: COMMERCIAL

## 2022-01-06 ENCOUNTER — DOCUMENTATION ONLY (OUTPATIENT)
Dept: REHABILITATION | Facility: HOSPITAL | Age: 32
End: 2022-01-06
Payer: COMMERCIAL

## 2022-01-06 ENCOUNTER — TELEPHONE (OUTPATIENT)
Dept: INTERNAL MEDICINE | Facility: CLINIC | Age: 32
End: 2022-01-06
Payer: COMMERCIAL

## 2022-01-06 VITALS
BODY MASS INDEX: 30.94 KG/M2 | DIASTOLIC BLOOD PRESSURE: 90 MMHG | OXYGEN SATURATION: 98 % | WEIGHT: 215.63 LBS | SYSTOLIC BLOOD PRESSURE: 146 MMHG | HEART RATE: 114 BPM

## 2022-01-06 DIAGNOSIS — R42 LIGHT HEADED: ICD-10-CM

## 2022-01-06 DIAGNOSIS — I10 PRIMARY HYPERTENSION: ICD-10-CM

## 2022-01-06 DIAGNOSIS — R07.89 OTHER CHEST PAIN: Primary | ICD-10-CM

## 2022-01-06 DIAGNOSIS — R94.31 ABNORMAL EKG: ICD-10-CM

## 2022-01-06 DIAGNOSIS — R94.31 EKG ABNORMALITIES: ICD-10-CM

## 2022-01-06 DIAGNOSIS — I47.9 TACHYCARDIA, PAROXYSMAL: ICD-10-CM

## 2022-01-06 PROCEDURE — 3080F DIAST BP >= 90 MM HG: CPT | Mod: CPTII,S$GLB,, | Performed by: INTERNAL MEDICINE

## 2022-01-06 PROCEDURE — 3077F PR MOST RECENT SYSTOLIC BLOOD PRESSURE >= 140 MM HG: ICD-10-PCS | Mod: CPTII,S$GLB,, | Performed by: INTERNAL MEDICINE

## 2022-01-06 PROCEDURE — 99204 OFFICE O/P NEW MOD 45 MIN: CPT | Mod: S$GLB,,, | Performed by: INTERNAL MEDICINE

## 2022-01-06 PROCEDURE — 99999 PR PBB SHADOW E&M-EST. PATIENT-LVL IV: CPT | Mod: PBBFAC,,, | Performed by: INTERNAL MEDICINE

## 2022-01-06 PROCEDURE — 3080F PR MOST RECENT DIASTOLIC BLOOD PRESSURE >= 90 MM HG: ICD-10-PCS | Mod: CPTII,S$GLB,, | Performed by: INTERNAL MEDICINE

## 2022-01-06 PROCEDURE — 99999 PR PBB SHADOW E&M-EST. PATIENT-LVL IV: ICD-10-PCS | Mod: PBBFAC,,, | Performed by: INTERNAL MEDICINE

## 2022-01-06 PROCEDURE — 1159F PR MEDICATION LIST DOCUMENTED IN MEDICAL RECORD: ICD-10-PCS | Mod: CPTII,S$GLB,, | Performed by: INTERNAL MEDICINE

## 2022-01-06 PROCEDURE — 1160F PR REVIEW ALL MEDS BY PRESCRIBER/CLIN PHARMACIST DOCUMENTED: ICD-10-PCS | Mod: CPTII,S$GLB,, | Performed by: INTERNAL MEDICINE

## 2022-01-06 PROCEDURE — 3008F BODY MASS INDEX DOCD: CPT | Mod: CPTII,S$GLB,, | Performed by: INTERNAL MEDICINE

## 2022-01-06 PROCEDURE — 1159F MED LIST DOCD IN RCRD: CPT | Mod: CPTII,S$GLB,, | Performed by: INTERNAL MEDICINE

## 2022-01-06 PROCEDURE — 3008F PR BODY MASS INDEX (BMI) DOCUMENTED: ICD-10-PCS | Mod: CPTII,S$GLB,, | Performed by: INTERNAL MEDICINE

## 2022-01-06 PROCEDURE — 3077F SYST BP >= 140 MM HG: CPT | Mod: CPTII,S$GLB,, | Performed by: INTERNAL MEDICINE

## 2022-01-06 PROCEDURE — 99204 PR OFFICE/OUTPT VISIT, NEW, LEVL IV, 45-59 MIN: ICD-10-PCS | Mod: S$GLB,,, | Performed by: INTERNAL MEDICINE

## 2022-01-06 PROCEDURE — 1160F RVW MEDS BY RX/DR IN RCRD: CPT | Mod: CPTII,S$GLB,, | Performed by: INTERNAL MEDICINE

## 2022-01-06 RX ORDER — METOPROLOL SUCCINATE 50 MG/1
50 TABLET, EXTENDED RELEASE ORAL DAILY
Qty: 30 TABLET | Refills: 11 | Status: SHIPPED | OUTPATIENT
Start: 2022-01-06 | End: 2022-01-19

## 2022-01-06 NOTE — PATIENT INSTRUCTIONS
Patient Education       DASH Diet   About this topic   DASH stands for Dietary Approaches to Stop Hypertension. The DASH diet may help you lower blood pressure. It may also help keep you from getting high blood pressure. You will eat less fat and more fiber on the DASH diet.  This diet gives you more minerals that fight high blood pressure. Some nutrients in this diet are:  · Potassium ? Acts to help you get rid of salt. This may help to lower blood pressure.  · Calcium ? Makes blood vessels and muscles work the right way  · Magnesium - Helps blood vessels relax  · Fiber ? Helps you feel full. It also helps digestion.  What will the results be?   The DASH diet may help you:  · Lower your blood pressure and cholesterol  · Lower your risk for cancer, heart disease, heart attack, and stroke. It may also lower your risk for heart failure, kidney stones, and diabetes.  · Lose weight or keep a healthy weight  What lifestyle changes are needed?   · Add regular exercise to get the most help from this diet.  · Try to lower stress. Find ways to relax.  · Stop smoking. Avoid secondhand smoke.  · Limit alcohol intake.  What changes to diet are needed?   · Know about poor eating habits. Then, you can fix them as you work with the program.  · This diet encourages fruits and vegetables, whole grains, lean meats, healthy fats, and low-fat or fat-free dairy products.  · This diet is lower in saturated fats, trans-fats, cholesterol, added sugars, and sodium.  Who should use this diet?   This eating plan is good for the whole family. It is also good for people with high blood pressure and those at risk for high blood pressure.  What foods are good to eat?   · Grains: Try to eat 6 to 8 servings of whole grain, high fiber foods each day. These are bread, cereals, brown rice, or pasta.  · Fruits and vegetables: Eat 4 to 5 servings each day. Try to pick many kinds and colors. Fresh or frozen are best. Look for low sodium or salt-free if  you choose canned.  · Dairy: Try to eat 2 to 3 servings of fat free and low fat milk products each day.  · Lean meats, poultry, and seafood: Try to eat 6 servings or less of lean meats, poultry, and seafood each day. Try to choose more low fat or lean meats like chicken and turkey. Eat less red meat. Eat more fish instead.  · Nuts, seeds, and legumes (dry beans and peas): Try to eat 4 to 5 servings each week. Try to pick nuts such as almonds and walnuts, sunflower seeds, peanut butter, soy beans, lentils, kidney beans, and split peas.  · Fats and oils: Try to eat 2 to 3 servings of fats and oils each day. Eat good fats found in fish, nuts, and avocados. Try using olive oil or vegetable oils such as canola oil. Other good oils to try are corn, safflower, sunflower, or soybean oils. Use low-sodium and low-fat salad dressing and mayonnaise.  · Condiments: Pepper, herbs, spices, vinegar, lemon or lime juices are great for seasoning. Be careful to choose low-sodium or salt-free products if you use broths, soups, or soy sauce.  · Sweets: Try to eat less than 5 servings each week. Choose low-fat and trans fat-free desserts. These are things like fruit flavored gelatin, sorbet, jelly beans, arsh crackers, animal crackers, low-fat fig bars, and jason snaps. Eat fruit to satisfy your desire for sweets.     What foods should be limited or avoided?   · Grains: Salted breads, rolls, crackers, quick breads, self-rising flours, biscuit mixes, regular bread crumbs, instant hot cereals, commercially-prepared rice, pasta, stuffing mixes  · Fruits and vegetables: Commercially-prepared potatoes and vegetable mixes, regular canned vegetables and juices, vegetables frozen with sauce or pickled vegetables, processed fruits with salt or sodium  · Milk: Whole milk, malted milk, chocolate milk, buttermilk, cheese, ice cream  · Meats and beans: Smoked, cured, salted, or canned fish; meats or poultry such as recio, sausages, sardines;  high-fat cuts of meat like beef, lamb, or pork; chicken with the skin on it  · Fats: Cut back on solid fats like butter, lard, and margarine. Eat less food with high saturated fat, cholesterol and total fat.  · Condiments and snacks: Salted and canned peas, beans, and olives; salted snack foods; fried foods; soda or other sweetened drinks  · Sweets: High-fat baked goods such as muffins, donuts, pastries, commercial baked goods, candy bars  · If you choose to drink alcohol, limit the amount you drink. Women should have 1 drink or less per day and men should have 2 drinks or less per day.  Helpful tips   · Avoid eating canned vegetables and processed foods. These have a lot of salt in them. Look for a low-salt or low-sodium choice.  · Try baking or broiling instead of frying food.  · Write down the foods you eat. This will help you track what you have eaten each week.  · When you go to a grocery store, have a list or a meal plan. Do not shop when you are hungry to avoid cravings for foods.  · Read food labels with care. They will show you how much is in a serving. The amount is given as a percentage of the total amount you need each day. Reading labels will help you make healthy food choices.       · Avoid fast foods.  · Talk to your doctor or dietitian to see if you need vitamin and mineral supplements to help you balance your diet.  · Talk to a dietitian for help.  Where can I learn more?   [employer]y [employer]etics  https://www.eatright.org/health/wellness/heart-and-cardiovascular-health/dash-diet-reducing-hypertension-through-diet-and-lifestyle   FamilyDoctor.org  http://familydoctor.org/familydoctor/en/prevention-wellness/food-nutrition/weight-loss/the-dash-diet-healthy-eating-to-control-your-blood-pressure.html   Last Reviewed Date   2021-03-15  Consumer Information Use and Disclaimer   This information is not specific medical advice and does not replace information you receive from your health care  provider. This is only a brief summary of general information. It does NOT include all information about conditions, illnesses, injuries, tests, procedures, treatments, therapies, discharge instructions or life-style choices that may apply to you. You must talk with your health care provider for complete information about your health and treatment options. This information should not be used to decide whether or not to accept your health care providers advice, instructions or recommendations. Only your health care provider has the knowledge and training to provide advice that is right for you.  Copyright   Copyright © 2021 [employer] and its affiliates and/or licensors. All rights reserved.

## 2022-01-06 NOTE — PROGRESS NOTES
Subjective:   Patient ID:  Alphonse Claude Mackey III is a 31 y.o. male who presents for evaluation of No chief complaint on file.      32 yo male, referred for abnormal EKG and HTN.   PMH upper back pain on PT. anxiety  Some chest tightness and mild dyspnea when walking. Occasional lightheadedness with high BP. Palpitation when anxious.  No faint and syncope. May wake up at night due to anxiety  Work out a lot bfore the neck pain. No smoking/drinking. Mother HTN. Father alcoholics and  of 34  ekg NSR and poor r progression on precordial leads.           Past Medical History:   Diagnosis Date    Anxiety     Primary hypertension 2022       Past Surgical History:   Procedure Laterality Date    ADENOIDECTOMY      TONSILLECTOMY         Social History     Tobacco Use    Smoking status: Never Smoker    Smokeless tobacco: Never Used   Substance Use Topics    Alcohol use: Never    Drug use: Never       Family History   Problem Relation Age of Onset    Obesity Paternal Grandmother     Cancer Paternal Grandfather        Review of Systems   Constitutional: Negative for decreased appetite, diaphoresis, fever, malaise/fatigue and night sweats.   HENT: Negative for nosebleeds.    Eyes: Negative for blurred vision and double vision.   Cardiovascular: Positive for chest pain, dyspnea on exertion and palpitations. Negative for claudication, irregular heartbeat, leg swelling, near-syncope, orthopnea, paroxysmal nocturnal dyspnea and syncope.   Respiratory: Negative for cough, shortness of breath, sleep disturbances due to breathing, snoring, sputum production and wheezing.    Endocrine: Negative for cold intolerance and polyuria.   Hematologic/Lymphatic: Does not bruise/bleed easily.   Skin: Negative for rash.   Musculoskeletal: Negative for back pain, falls, joint pain, joint swelling and neck pain.   Gastrointestinal: Negative for abdominal pain, heartburn, nausea and vomiting.   Genitourinary: Negative  for dysuria, frequency and hematuria.   Neurological: Positive for light-headedness. Negative for difficulty with concentration, dizziness, focal weakness, headaches, numbness, seizures and weakness.   Psychiatric/Behavioral: Negative for depression, memory loss and substance abuse. The patient is nervous/anxious. The patient does not have insomnia.    Allergic/Immunologic: Negative for HIV exposure and hives.       Objective:   Physical Exam  Constitutional:       Appearance: He is well-nourished.   HENT:      Head: Normocephalic.   Eyes:      Pupils: Pupils are equal, round, and reactive to light.   Neck:      Thyroid: No thyromegaly.      Vascular: Normal carotid pulses. No carotid bruit or JVD.   Cardiovascular:      Rate and Rhythm: Regular rhythm. Tachycardia present.  No extrasystoles are present.     Chest Wall: PMI is not displaced.      Pulses: Normal pulses.           Carotid pulses are 2+ on the right side and 2+ on the left side.     Heart sounds: Normal heart sounds. No murmur heard.  No gallop. No S3 sounds.    Pulmonary:      Effort: No respiratory distress.      Breath sounds: Normal breath sounds. No stridor.   Abdominal:      General: Bowel sounds are normal.      Palpations: Abdomen is soft.      Tenderness: There is no abdominal tenderness. There is no rebound.   Musculoskeletal:         General: Normal range of motion.   Skin:     General: Skin is intact.      Findings: No rash.   Neurological:      Mental Status: He is alert and oriented to person, place, and time.   Psychiatric:         Behavior: Behavior normal.         Lab Results   Component Value Date    CHOL 182 01/04/2022    CHOL 186 12/10/2020    CHOL 166 06/01/2018     Lab Results   Component Value Date    HDL 53 01/04/2022    HDL 61 12/10/2020    HDL 59 06/01/2018     Lab Results   Component Value Date    LDLCALC 120.6 01/04/2022    LDLCALC 115.0 12/10/2020    LDLCALC 101.4 06/01/2018     Lab Results   Component Value Date    TRIG 42  01/04/2022    TRIG 50 12/10/2020    TRIG 28 (L) 06/01/2018     Lab Results   Component Value Date    CHOLHDL 29.1 01/04/2022    CHOLHDL 32.8 12/10/2020    CHOLHDL 35.5 06/01/2018       Chemistry        Component Value Date/Time     01/04/2022 1245    K 4.4 01/04/2022 1245     01/04/2022 1245    CO2 29 01/04/2022 1245    BUN 13 01/04/2022 1245    CREATININE 0.9 01/04/2022 1245    GLU 97 01/04/2022 1245        Component Value Date/Time    CALCIUM 10.0 01/04/2022 1245    ALKPHOS 49 (L) 01/04/2022 1245    AST 27 01/04/2022 1245    ALT 33 01/04/2022 1245    BILITOT 0.7 01/04/2022 1245    ESTGFRAFRICA >60.0 01/04/2022 1245    EGFRNONAA >60.0 01/04/2022 1245          No results found for: LABA1C, HGBA1C  Lab Results   Component Value Date    TSH 1.415 01/04/2022     No results found for: INR, PROTIME  Lab Results   Component Value Date    WBC 4.64 01/04/2022    HGB 16.4 01/04/2022    HCT 48.3 01/04/2022    MCV 87 01/04/2022     01/04/2022     BNP  @LABRCNTIP(BNP,BNPTRIAGEBLO)@  Estimated Creatinine Clearance: 139.4 mL/min (based on SCr of 0.9 mg/dL).  No results found in the last 24 hours.  No results found in the last 24 hours.  No results found in the last 24 hours.    Assessment:      1. Other chest pain    2. Light headed    3. Abnormal EKG    4. Primary hypertension    5. Tachycardia, paroxysmal    6. EKG abnormalities        Plan:   Add ToprolXL 50 mg for HTN and tachy  HOLTER ECHo and ETT for chest pain tachy HTN and abnormal EKG  Counseled DASH  Check Lipid profile in 6 months  Recommend heart-healthy diet, weight control and regular exercise.  Cynthia. Risk modification.   I have reviewed all pertinent labs and cardiac studies independently. Plans and recommendations have been formulated under my direct supervision. All questions answered and patient voiced understanding.   If symptoms persist go to the ED  F/u with PCP

## 2022-01-06 NOTE — PROGRESS NOTES
PT called patient regarding his appointment today. Patient reports he cancelled his appointment on line due to a cardiologist appointment scheduled for today.

## 2022-01-06 NOTE — TELEPHONE ENCOUNTER
----- Message from John Garcia sent at 1/5/2022  4:24 PM CST -----  Contact: [phone]  Patient is returning a phone call.  Who left a message for the patient: Erica ESCOBAR    Does patient know what this is regarding:  test results    Would you like a call back, or a response through your [facility] portal?:    call  Comments:

## 2022-01-07 ENCOUNTER — PATIENT MESSAGE (OUTPATIENT)
Dept: INTERNAL MEDICINE | Facility: CLINIC | Age: 32
End: 2022-01-07
Payer: COMMERCIAL

## 2022-01-12 ENCOUNTER — HOSPITAL ENCOUNTER (OUTPATIENT)
Dept: CARDIOLOGY | Facility: HOSPITAL | Age: 32
Discharge: HOME | End: 2022-01-12
Attending: INTERNAL MEDICINE
Payer: COMMERCIAL

## 2022-01-12 ENCOUNTER — CLINICAL SUPPORT (OUTPATIENT)
Dept: REHABILITATION | Facility: HOSPITAL | Age: 32
End: 2022-01-12
Payer: COMMERCIAL

## 2022-01-12 VITALS
DIASTOLIC BLOOD PRESSURE: 85 MMHG | BODY MASS INDEX: 30.78 KG/M2 | HEART RATE: 99 BPM | HEART RATE: 99 BPM | SYSTOLIC BLOOD PRESSURE: 142 MMHG | HEIGHT: 70 IN | WEIGHT: 215 LBS | DIASTOLIC BLOOD PRESSURE: 85 MMHG | WEIGHT: 215 LBS | SYSTOLIC BLOOD PRESSURE: 142 MMHG | HEIGHT: 70 IN | BODY MASS INDEX: 30.78 KG/M2

## 2022-01-12 DIAGNOSIS — I47.9 TACHYCARDIA, PAROXYSMAL: ICD-10-CM

## 2022-01-12 DIAGNOSIS — R07.89 OTHER CHEST PAIN: ICD-10-CM

## 2022-01-12 DIAGNOSIS — M54.2 CERVICAL PAIN: ICD-10-CM

## 2022-01-12 DIAGNOSIS — R94.31 ABNORMAL EKG: ICD-10-CM

## 2022-01-12 DIAGNOSIS — I10 PRIMARY HYPERTENSION: ICD-10-CM

## 2022-01-12 LAB
CV STRESS BASE HR: 99 BPM
DIASTOLIC BLOOD PRESSURE: 85 MMHG
OHS CV CPX 1 MINUTE RECOVERY HEART RATE: 157 BPM
OHS CV CPX 85 PERCENT MAX PREDICTED HEART RATE MALE: 161
OHS CV CPX ESTIMATED METS: 17.2
OHS CV CPX MAX PREDICTED HEART RATE: 189
OHS CV CPX PATIENT IS FEMALE: 0
OHS CV CPX PATIENT IS MALE: 1
OHS CV CPX PEAK DIASTOLIC BLOOD PRESSURE: 105 MMHG
OHS CV CPX PEAK HEAR RATE: 169 BPM
OHS CV CPX PEAK RATE PRESSURE PRODUCT: NORMAL
OHS CV CPX PEAK SYSTOLIC BLOOD PRESSURE: 195 MMHG
OHS CV CPX PERCENT MAX PREDICTED HEART RATE ACHIEVED: 89
OHS CV CPX RATE PRESSURE PRODUCT PRESENTING: NORMAL
STRESS ECHO POST EXERCISE DUR MIN: 13 MINUTES
STRESS ECHO POST EXERCISE DUR SEC: 0 SECONDS
SYSTOLIC BLOOD PRESSURE: 142 MMHG

## 2022-01-12 PROCEDURE — 97012 MECHANICAL TRACTION THERAPY: CPT

## 2022-01-12 PROCEDURE — 93018 EXERCISE STRESS - EKG (CUPID ONLY): ICD-10-PCS | Mod: ,,, | Performed by: STUDENT IN AN ORGANIZED HEALTH CARE EDUCATION/TRAINING PROGRAM

## 2022-01-12 PROCEDURE — 93225 XTRNL ECG REC<48 HRS REC: CPT

## 2022-01-12 PROCEDURE — 93306 TTE W/DOPPLER COMPLETE: CPT | Mod: 26,,, | Performed by: STUDENT IN AN ORGANIZED HEALTH CARE EDUCATION/TRAINING PROGRAM

## 2022-01-12 PROCEDURE — 97010 HOT OR COLD PACKS THERAPY: CPT

## 2022-01-12 PROCEDURE — 93306 TTE W/DOPPLER COMPLETE: CPT

## 2022-01-12 PROCEDURE — 93017 CV STRESS TEST TRACING ONLY: CPT

## 2022-01-12 PROCEDURE — 93227 HOLTER MONITOR - 48 HOUR (CUPID ONLY): ICD-10-PCS | Mod: ,,, | Performed by: INTERNAL MEDICINE

## 2022-01-12 PROCEDURE — 93016 CV STRESS TEST SUPVJ ONLY: CPT | Mod: ,,, | Performed by: STUDENT IN AN ORGANIZED HEALTH CARE EDUCATION/TRAINING PROGRAM

## 2022-01-12 PROCEDURE — 93016 EXERCISE STRESS - EKG (CUPID ONLY): ICD-10-PCS | Mod: ,,, | Performed by: STUDENT IN AN ORGANIZED HEALTH CARE EDUCATION/TRAINING PROGRAM

## 2022-01-12 PROCEDURE — 97140 MANUAL THERAPY 1/> REGIONS: CPT

## 2022-01-12 PROCEDURE — 93018 CV STRESS TEST I&R ONLY: CPT | Mod: ,,, | Performed by: STUDENT IN AN ORGANIZED HEALTH CARE EDUCATION/TRAINING PROGRAM

## 2022-01-12 PROCEDURE — 93306 ECHO (CUPID ONLY): ICD-10-PCS | Mod: 26,,, | Performed by: STUDENT IN AN ORGANIZED HEALTH CARE EDUCATION/TRAINING PROGRAM

## 2022-01-12 PROCEDURE — 97110 THERAPEUTIC EXERCISES: CPT

## 2022-01-12 PROCEDURE — 93227 XTRNL ECG REC<48 HR R&I: CPT | Mod: ,,, | Performed by: INTERNAL MEDICINE

## 2022-01-12 NOTE — PROGRESS NOTES
....[facility]   Physical Therapy Treatment Note     Name: Alphonse Claude Mackey III  Clinic Number: [id number]    Therapy Diagnosis:   Encounter Diagnosis   Name Primary?    Cervical pain      Physician: Claus Goodman MD    Visit Date: 1/12/2022      Physician Orders: PT Eval and Treat   Medical Diagnosis from Referral:   S16.1XXA (ICD-10-CM) - Strain of neck muscle, initial encounter   M54.12 (ICD-10-CM) - Cervical radiculitis      Evaluation Date: 12/10/2021  Authorization Period Expiration: 12/31/21  Plan of Care Expiration: 1/21/22  Visit # / Visits authorized: 2/ 4 (4 visits completed in 2021 including eval)  FOTO: 2/3        PTA Visit #: 0/5     Time In: 8:00  Time Out: 8:53  Total Billable Time:40 minutes    SUBJECTIVE     Pt reports: his neck feels tight at certain times and he is able to change positions and stretch and he feels better. Patient had an EKG last week and found he had an abnormal heart rhythm and will have further testing this week. Patient said it might be due to anxiety and stress. He also reports pain in suboccipital region with prone position and with swimming.  He was compliant with home exercise program.  Response to previous treatment: good  Functional change: more aware of his posture    Pain: 5/10  Location: left neck / L shoulder / suboccipital    OBJECTIVE     Cervical rotation B 80 degrees    Treatment     Raj received the treatments listed below:      therapeutic exercises to develop strength, endurance, ROM, flexibility, posture and core stabilization for 15 minutes including:  UBE 8' for upright posture, endurance, strengthening   Levator scapuae stretch B 3 x 30 secs   Rhomboid stretch 3 x 30 secs  MFT rows 40# 2 x 20 reps NP  MFT pull downs 70# 2 x 20 reps NP  MFT lateral raises 40# 2 x 20 reps NP  Diagonals with B TB 2 x 20 reps B not performed today  Chest pulls with B TB 2 x 20 rep not performed today   push ups on plinth 2 x 20 reps  NP  HEP reviewed & posture reviewed including ergonomics  Wall angels 2 x 15 reps NP  rebounder overhead with not performed today  ER with band with B TB 2 x 15 reps NP  Prone thumbs up 2 x 15 reps more challenging 2# NP  Prone thumbs down 2 x 15 reps 2# NP  Cervical extension with towel reviewed 1 x 10 reps d/c next visit  Corner wall stretch 3 x 20 secs NP    NP= Not performed  Manual therapy to neck which included TP & STM,   suboccipital release, ROM & stretching  x 10 min     Mechanical Cervical traction with MH x 15 minutes static hold 10sec/ 60 secs    Patient Education and Home Exercises     Home Exercises Provided and Patient Education Provided     Education provided: yes, posture reviewed and ergonomics, instructed to perform rhomboid stretch and levator scapula stretch in the am      Written Home Exercises Provided: Patient instructed to cont prior HEP. Exercises were reviewed and Raj was able to demonstrate them prior to the end of the session.  Raj demonstrated good  understanding of the education provided. See EMR under Patient Instructions for exercises provided during therapy sessions      ASSESSMENT   Mechanical cervical traction initiated today. Patient tolerated treatment well and was finally able to relax. He reported he felt better after treatment and not as stiff or tight.       Raj Is progressing well towards his goals.  Pt prognosis is Excellent.     Pt will continue to benefit from skilled outpatient physical therapy to address the deficits listed in the problem list box on initial evaluation, provide pt/family education and to maximize pt's level of independence in the home and community environment.     Pt's spiritual, cultural and educational needs considered and pt agreeable to plan of care and goals.     Anticipated barriers to physical therapy: none    Goals:  Short Term Goals: 2 weeks      1. Patient will be independent with 50% of HEP Met     LTG's 6 weeks  1. Patient  will improve FOTO disability score  to 14 %  disability or less in order to improve overall QOL & return to PRIOR LEVEL OF FUNCTION  2. Patient will report no pain or tightness in neck and shoulder musculature  3. Patient will be more aware of his posture during the work day and apply PT recommendations   4. Improve cervical rotation to the L by at least 5 degrees with no complaints of pain Met  5. Patient will be able to transition from R SL with no difficulty or pain        Plan   Plan of care Certification: 12/10/2021 to 1/21/22     Outpatient Physical Therapy 2 times weekly for 6 weeks to include the following interventions: Cervical/Lumbar Traction, Electrical Stimulation PRN, Manual Therapy, Moist Heat/ Ice, Patient Education, Self Care, Therapeutic Activites, Therapeutic Exercise and Ultrasound, ASTYM, Kinesiotaping PRN, Functional Dry Needling       Nithya Gatica, PT

## 2022-01-13 ENCOUNTER — TELEPHONE (OUTPATIENT)
Dept: CARDIOLOGY | Facility: CLINIC | Age: 32
End: 2022-01-13
Payer: COMMERCIAL

## 2022-01-13 LAB
AORTIC ROOT ANNULUS: 2.89 CM
ASCENDING AORTA: 2.69 CM
AV INDEX (PROSTH): 0.71
AV MEAN GRADIENT: 6 MMHG
AV PEAK GRADIENT: 11 MMHG
AV VALVE AREA: 2.05 CM2
AV VELOCITY RATIO: 0.68
BSA FOR ECHO PROCEDURE: 2.19 M2
CV ECHO LV RWT: 0.54 CM
DOP CALC AO PEAK VEL: 1.67 M/S
DOP CALC AO VTI: 25.6 CM
DOP CALC LVOT AREA: 2.9 CM2
DOP CALC LVOT DIAMETER: 1.91 CM
DOP CALC LVOT PEAK VEL: 1.13 M/S
DOP CALC LVOT STROKE VOLUME: 52.41 CM3
DOP CALC RVOT PEAK VEL: 0.98 M/S
DOP CALC RVOT VTI: 16.7 CM
DOP CALCLVOT PEAK VEL VTI: 18.3 CM
E WAVE DECELERATION TIME: 228.13 MSEC
E/A RATIO: 1.07
ECHO EF ESTIMATED: 59 %
ECHO LV POSTERIOR WALL: 1.08 CM (ref 0.6–1.1)
EJECTION FRACTION: 65 %
FRACTIONAL SHORTENING: 31 % (ref 28–44)
INTERVENTRICULAR SEPTUM: 1.15 CM (ref 0.6–1.1)
IVRT: 79.92 MSEC
LA MAJOR: 3.28 CM
LA MINOR: 3.36 CM
LA WIDTH: 2.75 CM
LEFT ATRIUM SIZE: 2.73 CM
LEFT ATRIUM VOLUME INDEX MOD: 7.6 ML/M2
LEFT ATRIUM VOLUME INDEX: 9.9 ML/M2
LEFT ATRIUM VOLUME MOD: 16.25 CM3
LEFT ATRIUM VOLUME: 21.18 CM3
LEFT INTERNAL DIMENSION IN SYSTOLE: 2.75 CM (ref 2.1–4)
LEFT VENTRICLE DIASTOLIC VOLUME INDEX: 32.04 ML/M2
LEFT VENTRICLE DIASTOLIC VOLUME: 68.89 ML
LEFT VENTRICLE MASS INDEX: 68 G/M2
LEFT VENTRICLE SYSTOLIC VOLUME INDEX: 13.1 ML/M2
LEFT VENTRICLE SYSTOLIC VOLUME: 28.2 ML
LEFT VENTRICULAR INTERNAL DIMENSION IN DIASTOLE: 3.97 CM (ref 3.5–6)
LEFT VENTRICULAR MASS: 146.83 G
LV SEPTAL E/E' RATIO: 7.8 M/S
LVOT MG: 2.76 MMHG
LVOT MV: 0.77 CM/S
MV PEAK A VEL: 0.73 M/S
MV PEAK E VEL: 0.78 M/S
MV STENOSIS PRESSURE HALF TIME: 66.16 MS
MV VALVE AREA P 1/2 METHOD: 3.33 CM2
PV MEAN GRADIENT: 2.26 MMHG
PV PEAK VELOCITY: 1.15 CM/S
RA MAJOR: 3.41 CM
RA PRESSURE: 3 MMHG
RA WIDTH: 2.66 CM
RIGHT VENTRICULAR END-DIASTOLIC DIMENSION: 2.75 CM
SINUS: 2.68 CM
STJ: 2.34 CM
TDI SEPTAL: 0.1 M/S

## 2022-01-13 NOTE — TELEPHONE ENCOUNTER
Pt contacted about results, pt verbalized understanding.        ----- Message from Lauro Carver MD sent at 1/13/2022  2:04 PM CST -----  Echo showed normal function  Stress test normal

## 2022-01-14 ENCOUNTER — PATIENT MESSAGE (OUTPATIENT)
Dept: INTERNAL MEDICINE | Facility: CLINIC | Age: 32
End: 2022-01-14
Payer: COMMERCIAL

## 2022-01-15 ENCOUNTER — PATIENT MESSAGE (OUTPATIENT)
Dept: CARDIOLOGY | Facility: CLINIC | Age: 32
End: 2022-01-15
Payer: COMMERCIAL

## 2022-01-16 ENCOUNTER — NURSE TRIAGE (OUTPATIENT)
Dept: ADMINISTRATIVE | Facility: CLINIC | Age: 32
End: 2022-01-16
Payer: COMMERCIAL

## 2022-01-16 NOTE — TELEPHONE ENCOUNTER
Pt calling an states that he was placed on Metoprolol by his cardiologist after recent evaluation for HTN. He has been on med 10 days and states he has been feeling dizzy and lightheaded. Medication side effects reviewed with Raj and protocol reviewed in reference to meds that can cause diziness. Informed that some medications need to be taken for a few weeks for the body to adjust to med and have a therapeutic level.Protocol reviewed and care advice given. Pt agrees with advice and verbalizes understanding. Instructed to call for questions, concerns or changes.  Reason for Disposition   Taking a medicine that could cause dizziness (e.g., blood pressure medications, diuretics)    Additional Information   Negative: SEVERE difficulty breathing (e.g., struggling for each breath, speaks in single words)   Negative: [1] Difficulty breathing or swallowing AND [2] started suddenly after medicine, an allergic food or bee sting   Negative: Shock suspected (e.g., cold/pale/clammy skin, too weak to stand, low BP, rapid pulse)   Negative: Difficult to awaken or acting confused (e.g., disoriented, slurred speech)   Negative: [1] Weakness (i.e., paralysis, loss of muscle strength) of the face, arm or leg on one side of the body AND [2] sudden onset AND [3] present now   Negative: [1] Numbness (i.e., loss of sensation) of the face, arm or leg on one side of the body AND [2] sudden onset AND [3] present now   Negative: [1] Loss of speech or garbled speech AND [2] sudden onset AND [3] present now   Negative: Overdose (accidental or intentional) of medications   Negative: [1] Fainted > 15 minutes ago AND [2] still feels too weak or dizzy to stand   Negative: Heart beating < 50 beats per minute OR > 140 beats per minute   Negative: Sounds like a life-threatening emergency to the triager   Negative: Difficulty breathing   Negative: SEVERE dizziness (e.g., unable to stand, requires support to walk, feels like passing  "out now)   Negative: Extra heart beats OR irregular heart beating  (i.e., "palpitations")   Negative: [1] Drinking very little AND [2] dehydration suspected (e.g., no urine > 12 hours, very dry mouth, very lightheaded)   Negative: [1] Weakness (i.e., paralysis, loss of muscle strength) of the face, arm / hand, or leg / foot on one side of the body AND [2] sudden onset AND [3] brief (now gone)   Negative: [1] Numbness (i.e., loss of sensation) of the face, arm / hand, or leg / foot on one side of the body AND [2] sudden onset AND [3] brief (now gone)   Negative: [1] Loss of speech or garbled speech AND [2] sudden onset AND [3] brief (now gone)   Negative: Loss of vision or double vision (Exception: similar to previous migraines)   Negative: Patient sounds very sick or weak to the triager   Negative: [1] Dizziness caused by heat exposure, sudden standing, or poor fluid intake AND [2] no improvement after 2 hours of rest and fluids   Negative: [1] Fever > 103 F (39.4 C) AND [2] not able to get the fever down using Fever Care Advice   Negative: [1] Fever > 101 F (38.3 C) AND [2] age > 60 years   Negative: [1] Fever > 100.0 F (37.8 C) AND [2] bedridden (e.g., nursing home patient, CVA, chronic illness, recovering from surgery)   Negative: [1] Fever > 100.0 F (37.8 C) AND [2] diabetes mellitus or weak immune system (e.g., HIV positive, cancer chemo, splenectomy, organ transplant, chronic steroids)    Protocols used: DIZZINESS - [id number]    "

## 2022-01-18 ENCOUNTER — PATIENT MESSAGE (OUTPATIENT)
Dept: CARDIOLOGY | Facility: CLINIC | Age: 32
End: 2022-01-18
Payer: COMMERCIAL

## 2022-01-18 ENCOUNTER — TELEPHONE (OUTPATIENT)
Dept: INTERNAL MEDICINE | Facility: CLINIC | Age: 32
End: 2022-01-18
Payer: COMMERCIAL

## 2022-01-18 NOTE — TELEPHONE ENCOUNTER
----- Message from Lillie Crump sent at 1/14/2022  4:12 PM CST -----  Pt is requesting a call back in regards to medication. Pt states that she think he is having side appt and want to know if he should continue to take med. Pt states that he is having light headache. Pt can be reached at [phone] (mseg)

## 2022-01-18 NOTE — TELEPHONE ENCOUNTER
If metoprolol is the suspected cause for lightheaded and minor headache/head pressure then stop metoprolol.

## 2022-01-19 ENCOUNTER — OFFICE VISIT (OUTPATIENT)
Dept: INTERNAL MEDICINE | Facility: CLINIC | Age: 32
End: 2022-01-19
Payer: COMMERCIAL

## 2022-01-19 VITALS
DIASTOLIC BLOOD PRESSURE: 80 MMHG | HEART RATE: 94 BPM | WEIGHT: 213.19 LBS | TEMPERATURE: 98 F | BODY MASS INDEX: 30.59 KG/M2 | OXYGEN SATURATION: 99 % | SYSTOLIC BLOOD PRESSURE: 130 MMHG

## 2022-01-19 DIAGNOSIS — F41.9 ANXIETY: ICD-10-CM

## 2022-01-19 DIAGNOSIS — I10 PRIMARY HYPERTENSION: Primary | ICD-10-CM

## 2022-01-19 DIAGNOSIS — I47.9 TACHYCARDIA, PAROXYSMAL: ICD-10-CM

## 2022-01-19 PROCEDURE — 3008F BODY MASS INDEX DOCD: CPT | Mod: CPTII,S$GLB,, | Performed by: INTERNAL MEDICINE

## 2022-01-19 PROCEDURE — 3075F SYST BP GE 130 - 139MM HG: CPT | Mod: CPTII,S$GLB,, | Performed by: INTERNAL MEDICINE

## 2022-01-19 PROCEDURE — 3075F PR MOST RECENT SYSTOLIC BLOOD PRESS GE 130-139MM HG: ICD-10-PCS | Mod: CPTII,S$GLB,, | Performed by: INTERNAL MEDICINE

## 2022-01-19 PROCEDURE — 3079F PR MOST RECENT DIASTOLIC BLOOD PRESSURE 80-89 MM HG: ICD-10-PCS | Mod: CPTII,S$GLB,, | Performed by: INTERNAL MEDICINE

## 2022-01-19 PROCEDURE — 99999 PR PBB SHADOW E&M-EST. PATIENT-LVL III: CPT | Mod: PBBFAC,,, | Performed by: INTERNAL MEDICINE

## 2022-01-19 PROCEDURE — 1159F MED LIST DOCD IN RCRD: CPT | Mod: CPTII,S$GLB,, | Performed by: INTERNAL MEDICINE

## 2022-01-19 PROCEDURE — 99999 PR PBB SHADOW E&M-EST. PATIENT-LVL III: ICD-10-PCS | Mod: PBBFAC,,, | Performed by: INTERNAL MEDICINE

## 2022-01-19 PROCEDURE — 3079F DIAST BP 80-89 MM HG: CPT | Mod: CPTII,S$GLB,, | Performed by: INTERNAL MEDICINE

## 2022-01-19 PROCEDURE — 3008F PR BODY MASS INDEX (BMI) DOCUMENTED: ICD-10-PCS | Mod: CPTII,S$GLB,, | Performed by: INTERNAL MEDICINE

## 2022-01-19 PROCEDURE — 1159F PR MEDICATION LIST DOCUMENTED IN MEDICAL RECORD: ICD-10-PCS | Mod: CPTII,S$GLB,, | Performed by: INTERNAL MEDICINE

## 2022-01-19 PROCEDURE — 99214 PR OFFICE/OUTPT VISIT, EST, LEVL IV, 30-39 MIN: ICD-10-PCS | Mod: S$GLB,,, | Performed by: INTERNAL MEDICINE

## 2022-01-19 PROCEDURE — 99214 OFFICE O/P EST MOD 30 MIN: CPT | Mod: S$GLB,,, | Performed by: INTERNAL MEDICINE

## 2022-01-19 RX ORDER — PROPRANOLOL HYDROCHLORIDE 10 MG/1
10 TABLET ORAL 2 TIMES DAILY
Qty: 180 TABLET | Refills: 0 | Status: SHIPPED | OUTPATIENT
Start: 2022-01-19 | End: 2022-04-21 | Stop reason: SDUPTHER

## 2022-01-19 NOTE — PROGRESS NOTES
Subjective:      Patient ID: Alphonse Claude Mackey III is a 31 y.o. male.    Chief Complaint: Follow-up    HPI     30 yo with   Patient Active Problem List   Diagnosis    Panic disorder without agoraphobia with mild panic attacks    Prolapsed internal hemorrhoids, grade 2    Cervical pain    Primary hypertension    Tachycardia, paroxysmal    Other chest pain    EKG abnormalities     Past Medical History:   Diagnosis Date    Anxiety     Primary hypertension 1/6/2022     Here today for management of mult med problems as outlined below.  Compliant with meds without significant side effects. Energy and appetite good.     Has noticed talking to friends helps ease his mind and decreases light headed feeling. Therefore he has made appt with counselor.     dc'd metoprolol due to SEs.     Declined flu vaccine.     Review of Systems   Constitutional: Negative for chills and fever.   HENT: Negative for ear pain and sore throat.    Respiratory: Negative for cough.    Cardiovascular: Negative for chest pain.   Gastrointestinal: Negative for abdominal pain and blood in stool.   Genitourinary: Negative for dysuria and hematuria.   Neurological: Negative for seizures and syncope.     Objective:   /80 (BP Location: Left arm, Patient Position: Sitting, BP Method: Large (Manual))   Pulse 94   Temp 97.7 °F (36.5 °C) (Tympanic)   Wt 96.7 kg (213 lb 3 oz)   SpO2 99%   BMI 30.59 kg/m²     Physical Exam  Constitutional:       General: He is not in acute distress.     Appearance: He is well-developed and well-nourished.   Cardiovascular:      Rate and Rhythm: Normal rate.   Pulmonary:      Effort: Pulmonary effort is normal.      Breath sounds: Normal breath sounds.   Skin:     General: Skin is warm and dry.   Psychiatric:         Mood and Affect: Mood and affect normal.         Behavior: Behavior normal.         Assessment:     1. Primary hypertension    2. Tachycardia, paroxysmal    3. Anxiety      Plan:   Primary  hypertension  start  -     propranoloL (INDERAL) 10 MG tablet; Take 1 tablet (10 mg total) by mouth 2 (two) times daily.  Dispense: 180 tablet; Refill: 0    Tachycardia, paroxysmal  start  -     propranoloL (INDERAL) 10 MG tablet; Take 1 tablet (10 mg total) by mouth 2 (two) times daily.  Dispense: 180 tablet; Refill: 0    Anxiety  Declines meds. Seeking counseling.       Lab Frequency Next Occurrence   Ambulatory referral/consult to Physical/Occupational Therapy Once 12/06/2021   Ambulatory referral/consult to Psychiatry Once 01/11/2022       Problem List Items Addressed This Visit     Primary hypertension - Primary    Relevant Medications    propranoloL (INDERAL) 10 MG tablet    Tachycardia, paroxysmal    Relevant Medications    propranoloL (INDERAL) 10 MG tablet      Other Visit Diagnoses     Anxiety              Follow up in about 6 weeks (around 3/2/2022), or if symptoms worsen or fail to improve.

## 2022-01-20 LAB
OHS CV EVENT MONITOR DAY: 2
OHS CV HOLTER LENGTH DECIMAL HOURS: 96
OHS CV HOLTER LENGTH HOURS: 48
OHS CV HOLTER LENGTH MINUTES: 0
OHS CV HOLTER SINUS AVERAGE HR: 69
OHS CV HOLTER SINUS MAX HR: 145
OHS CV HOLTER SINUS MIN HR: 41

## 2022-01-21 ENCOUNTER — TELEPHONE (OUTPATIENT)
Dept: CARDIOLOGY | Facility: CLINIC | Age: 32
End: 2022-01-21
Payer: COMMERCIAL

## 2022-01-21 NOTE — TELEPHONE ENCOUNTER
Pt contacted about results, pt verbalized understanding.          ----- Message from Lauro Carver MD sent at 1/20/2022  8:47 PM CST -----  Holter rare arrhythmia  Normal study  RTC in 2 months

## 2022-02-13 ENCOUNTER — PATIENT MESSAGE (OUTPATIENT)
Dept: INTERNAL MEDICINE | Facility: CLINIC | Age: 32
End: 2022-02-13
Payer: COMMERCIAL

## 2022-03-02 ENCOUNTER — OFFICE VISIT (OUTPATIENT)
Dept: INTERNAL MEDICINE | Facility: CLINIC | Age: 32
End: 2022-03-02
Payer: COMMERCIAL

## 2022-03-02 ENCOUNTER — PATIENT MESSAGE (OUTPATIENT)
Dept: INTERNAL MEDICINE | Facility: CLINIC | Age: 32
End: 2022-03-02
Payer: COMMERCIAL

## 2022-03-02 VITALS
RESPIRATION RATE: 16 BRPM | WEIGHT: 220.25 LBS | SYSTOLIC BLOOD PRESSURE: 137 MMHG | BODY MASS INDEX: 31.53 KG/M2 | HEART RATE: 66 BPM | DIASTOLIC BLOOD PRESSURE: 88 MMHG | OXYGEN SATURATION: 100 % | HEIGHT: 70 IN | TEMPERATURE: 98 F

## 2022-03-02 DIAGNOSIS — R42 LIGHT-HEADED FEELING: Primary | ICD-10-CM

## 2022-03-02 PROCEDURE — 3008F PR BODY MASS INDEX (BMI) DOCUMENTED: ICD-10-PCS | Mod: CPTII,S$GLB,, | Performed by: INTERNAL MEDICINE

## 2022-03-02 PROCEDURE — 3008F BODY MASS INDEX DOCD: CPT | Mod: CPTII,S$GLB,, | Performed by: INTERNAL MEDICINE

## 2022-03-02 PROCEDURE — 3075F PR MOST RECENT SYSTOLIC BLOOD PRESS GE 130-139MM HG: ICD-10-PCS | Mod: CPTII,S$GLB,, | Performed by: INTERNAL MEDICINE

## 2022-03-02 PROCEDURE — 99999 PR PBB SHADOW E&M-EST. PATIENT-LVL IV: ICD-10-PCS | Mod: PBBFAC,,, | Performed by: INTERNAL MEDICINE

## 2022-03-02 PROCEDURE — 3079F PR MOST RECENT DIASTOLIC BLOOD PRESSURE 80-89 MM HG: ICD-10-PCS | Mod: CPTII,S$GLB,, | Performed by: INTERNAL MEDICINE

## 2022-03-02 PROCEDURE — 99213 OFFICE O/P EST LOW 20 MIN: CPT | Mod: S$GLB,,, | Performed by: INTERNAL MEDICINE

## 2022-03-02 PROCEDURE — 3075F SYST BP GE 130 - 139MM HG: CPT | Mod: CPTII,S$GLB,, | Performed by: INTERNAL MEDICINE

## 2022-03-02 PROCEDURE — 99999 PR PBB SHADOW E&M-EST. PATIENT-LVL IV: CPT | Mod: PBBFAC,,, | Performed by: INTERNAL MEDICINE

## 2022-03-02 PROCEDURE — 3079F DIAST BP 80-89 MM HG: CPT | Mod: CPTII,S$GLB,, | Performed by: INTERNAL MEDICINE

## 2022-03-02 PROCEDURE — 99213 PR OFFICE/OUTPT VISIT, EST, LEVL III, 20-29 MIN: ICD-10-PCS | Mod: S$GLB,,, | Performed by: INTERNAL MEDICINE

## 2022-03-02 NOTE — PROGRESS NOTES
"Subjective:      Patient ID: Alphonse Claude Mackey III is a 31 y.o. male.    Chief Complaint: Follow-up    HPI     30 yo with   Patient Active Problem List   Diagnosis    Panic disorder without agoraphobia with mild panic attacks    Prolapsed internal hemorrhoids, grade 2    Cervical pain    Primary hypertension    Tachycardia, paroxysmal    Other chest pain    EKG abnormalities    Imbalance     Past Medical History:   Diagnosis Date    Anxiety     Primary hypertension 1/6/2022     Here today for f/u    Working out again. Feeling better emotionally. Energy level good.     Has seen cards. Feels light headed/dizzy symptoms worse with change in head position. Tying shoes, leaning head forward or  back, standing from seated position.     Home bp since feb 18th. 115 to 128/ 80 to 88.     No symptoms when focusing or during entertainment.       Review of Systems   Constitutional: Negative for chills and fever.   HENT: Negative for ear pain and sore throat.    Respiratory: Negative for cough.    Cardiovascular: Negative for chest pain.   Gastrointestinal: Negative for abdominal pain and blood in stool.   Genitourinary: Negative for dysuria and hematuria.   Skin: Negative for wound.   Neurological: Negative for seizures and syncope.     Objective:   /88   Pulse 66   Temp 98.3 °F (36.8 °C) (Oral)   Resp 16   Ht 5' 10" (1.778 m)   Wt 99.9 kg (220 lb 3.8 oz)   SpO2 100%   BMI 31.60 kg/m²     Physical Exam  Constitutional:       General: He is awake. He is not in acute distress.     Appearance: Normal appearance. He is well-developed. He is not ill-appearing, toxic-appearing or diaphoretic.   HENT:      Head: Normocephalic and atraumatic.   Eyes:      Conjunctiva/sclera: Conjunctivae normal.   Cardiovascular:      Rate and Rhythm: Normal rate.   Pulmonary:      Effort: Pulmonary effort is normal.      Breath sounds: Normal breath sounds.   Musculoskeletal:      Cervical back: Normal range of motion. "   Skin:     General: Skin is warm and dry.   Neurological:      Mental Status: He is alert. Mental status is at baseline.   Psychiatric:         Mood and Affect: Mood normal.         Behavior: Behavior normal. Behavior is cooperative.         Thought Content: Thought content normal.         Judgment: Judgment normal.         Assessment:     1. Light-headed feeling      Plan:   Light-headed feeling  -     Ambulatory referral/consult to ENT; Future; Expected date: 03/09/2022        Lab Frequency Next Occurrence   Ambulatory referral/consult to Physical/Occupational Therapy Once 12/06/2021   Ambulatory referral/consult to Psychiatry Once 01/11/2022       Problem List Items Addressed This Visit    None     Visit Diagnoses     Light-headed feeling    -  Primary    Relevant Orders    Ambulatory referral/consult to ENT          Follow up in about 3 months (around 6/2/2022), or if symptoms worsen or fail to improve.

## 2022-03-03 ENCOUNTER — OFFICE VISIT (OUTPATIENT)
Dept: OTOLARYNGOLOGY | Facility: CLINIC | Age: 32
End: 2022-03-03
Payer: COMMERCIAL

## 2022-03-03 ENCOUNTER — CLINICAL SUPPORT (OUTPATIENT)
Dept: AUDIOLOGY | Facility: CLINIC | Age: 32
End: 2022-03-03
Payer: COMMERCIAL

## 2022-03-03 VITALS — HEART RATE: 102 BPM | SYSTOLIC BLOOD PRESSURE: 140 MMHG | TEMPERATURE: 98 F | DIASTOLIC BLOOD PRESSURE: 97 MMHG

## 2022-03-03 DIAGNOSIS — R26.89 IMBALANCE: ICD-10-CM

## 2022-03-03 DIAGNOSIS — R26.89 IMBALANCE: Primary | ICD-10-CM

## 2022-03-03 PROCEDURE — 99999 PR PBB SHADOW E&M-EST. PATIENT-LVL II: CPT | Mod: PBBFAC,,, | Performed by: AUDIOLOGIST-HEARING AID FITTER

## 2022-03-03 PROCEDURE — 3080F DIAST BP >= 90 MM HG: CPT | Mod: CPTII,S$GLB,, | Performed by: STUDENT IN AN ORGANIZED HEALTH CARE EDUCATION/TRAINING PROGRAM

## 2022-03-03 PROCEDURE — 99999 PR PBB SHADOW E&M-EST. PATIENT-LVL II: ICD-10-PCS | Mod: PBBFAC,,, | Performed by: AUDIOLOGIST-HEARING AID FITTER

## 2022-03-03 PROCEDURE — 92567 TYMPANOMETRY: CPT | Mod: S$GLB,,, | Performed by: AUDIOLOGIST-HEARING AID FITTER

## 2022-03-03 PROCEDURE — 3077F PR MOST RECENT SYSTOLIC BLOOD PRESSURE >= 140 MM HG: ICD-10-PCS | Mod: CPTII,S$GLB,, | Performed by: STUDENT IN AN ORGANIZED HEALTH CARE EDUCATION/TRAINING PROGRAM

## 2022-03-03 PROCEDURE — 3080F PR MOST RECENT DIASTOLIC BLOOD PRESSURE >= 90 MM HG: ICD-10-PCS | Mod: CPTII,S$GLB,, | Performed by: STUDENT IN AN ORGANIZED HEALTH CARE EDUCATION/TRAINING PROGRAM

## 2022-03-03 PROCEDURE — 92552 PR PURE TONE AUDIOMETRY, AIR: ICD-10-PCS | Mod: S$GLB,,, | Performed by: AUDIOLOGIST-HEARING AID FITTER

## 2022-03-03 PROCEDURE — 1159F PR MEDICATION LIST DOCUMENTED IN MEDICAL RECORD: ICD-10-PCS | Mod: CPTII,S$GLB,, | Performed by: STUDENT IN AN ORGANIZED HEALTH CARE EDUCATION/TRAINING PROGRAM

## 2022-03-03 PROCEDURE — 92556 PR SPEECH AUDIOMETRY, COMPLETE: ICD-10-PCS | Mod: S$GLB,,, | Performed by: AUDIOLOGIST-HEARING AID FITTER

## 2022-03-03 PROCEDURE — 99203 OFFICE O/P NEW LOW 30 MIN: CPT | Mod: S$GLB,,, | Performed by: STUDENT IN AN ORGANIZED HEALTH CARE EDUCATION/TRAINING PROGRAM

## 2022-03-03 PROCEDURE — 1159F MED LIST DOCD IN RCRD: CPT | Mod: CPTII,S$GLB,, | Performed by: STUDENT IN AN ORGANIZED HEALTH CARE EDUCATION/TRAINING PROGRAM

## 2022-03-03 PROCEDURE — 99999 PR PBB SHADOW E&M-EST. PATIENT-LVL III: CPT | Mod: PBBFAC,,, | Performed by: STUDENT IN AN ORGANIZED HEALTH CARE EDUCATION/TRAINING PROGRAM

## 2022-03-03 PROCEDURE — 99203 PR OFFICE/OUTPT VISIT, NEW, LEVL III, 30-44 MIN: ICD-10-PCS | Mod: S$GLB,,, | Performed by: STUDENT IN AN ORGANIZED HEALTH CARE EDUCATION/TRAINING PROGRAM

## 2022-03-03 PROCEDURE — 3077F SYST BP >= 140 MM HG: CPT | Mod: CPTII,S$GLB,, | Performed by: STUDENT IN AN ORGANIZED HEALTH CARE EDUCATION/TRAINING PROGRAM

## 2022-03-03 PROCEDURE — 99999 PR PBB SHADOW E&M-EST. PATIENT-LVL III: ICD-10-PCS | Mod: PBBFAC,,, | Performed by: STUDENT IN AN ORGANIZED HEALTH CARE EDUCATION/TRAINING PROGRAM

## 2022-03-03 PROCEDURE — 92552 PURE TONE AUDIOMETRY AIR: CPT | Mod: S$GLB,,, | Performed by: AUDIOLOGIST-HEARING AID FITTER

## 2022-03-03 PROCEDURE — 92556 SPEECH AUDIOMETRY COMPLETE: CPT | Mod: S$GLB,,, | Performed by: AUDIOLOGIST-HEARING AID FITTER

## 2022-03-03 PROCEDURE — 92567 PR TYMPA2METRY: ICD-10-PCS | Mod: S$GLB,,, | Performed by: AUDIOLOGIST-HEARING AID FITTER

## 2022-03-03 NOTE — PROGRESS NOTES
Alphonse Claude Mackey III was seen 03/03/2022 for an audiological evaluation.  Patient complains of imbalance that feels like swaying when he is standing still. Feels floaty, denies any spinning or lightheadness. Has had normal cardiac workup but does have hypertension. Denies hearing loss, tinnitus, ear fullness, headache, light sensitivity, vomiting.    Results reveal normal hearing in each ear from 250-8000 Hz.   Speech Reception Thresholds were  5 dBHL for the right ear and 10 dBHL for the left ear.   Word recognition scores were excellent for the right ear and excellent for the left ear.   Tympanograms were Type A, normal for the right ear and Type A, normal for the left ear.    Patient was counseled on the above findings.    Recommendations:  1. ENT review. Symptoms not likely vestibular.

## 2022-03-03 NOTE — PROGRESS NOTES
Chief complaint: No chief complaint on file.       History of Present Illness:     Mr. Willard is a 31 y.o. male presenting for evaluation of dizziness.     Onset:  A couple months  Frequency of episodes: daily  Duration of individual episodes: lasts while he is standing or walking  Exacerbating factors: walking or standing  Prior Medications: nothing  Relieving factors:  remaining motionless  Quality: off balance, swaying in place, floaty, denies any spinning or lightheadness  Prior history of similar events: No    Associated signs and symptoms:      Anxiety, HTN.     Had normal cardiac workup.     Denies hearing loss, tinnitus, ear fullness, headache, light sensitivity, vomiting.    Review of Systems     A complete 10 point ROS was completed and are positive as per above HPI.    Otherwise negative for fever, diplopia, chest pain, shortness of breath, vomiting, blood in urine, joint pain, skin rash, seizures and unusual bleeding.       History        Past Medical History:   Past Medical History:   Diagnosis Date    Anxiety     Primary hypertension 1/6/2022    .          Past Surgical History:  Past Surgical History:   Procedure Laterality Date    ADENOIDECTOMY      TONSILLECTOMY     .         Medications: Medication list was reviewed. He  has a current medication list which includes the following prescription(s): glucosamine-chondroitin, ibuprofen, multivitamin, and propranolol.         Allergies: Review of patient's allergies indicates:  No Known Allergies         Family history: family history includes Cancer in his paternal grandfather; Hypertension in his mother; No Known Problems in his sister; Obesity in his paternal grandmother.         Social History          Alcohol use:  reports no history of alcohol use.            Tobacco:  reports that he has never smoked. He has never used smokeless tobacco.         Please see the patient's intake form for full details of past medical history, past surgical  history, family history, social history and review of systems. ?This information was reviewed by me and noted.      Physical Examination     There were no vitals filed for this visit.     General: Well developed, well nourished, well hydrated. Verbal with a strong voice and not dysphonic.     Head/Face: Normocephalic, atraumatic. No scars or lesions. Facial musculature equal.     Eyes: No scleral icterus or conjunctival hemorrhage. EOMI. PERRLA.     Ears:     · Right ear: No gross deformity. EAC is clear of debris and erythema. The TM is intact with a pneumatized middle ear. No signs of retraction, fluid or infection.      · Left ear: No gross deformity. EAC is clear of debris and erythema. The TM is intact with a pneumatized middle ear. No signs of retraction, fluid or infection.        Neurologic: Moving all extremities without gross abnormality.CN II-XII grossly intact. House-Brackmann 1/6.     · Motor strength:  Strength 5/5 throughout.    · Sensation:  Sensory function to light touch and proprioception intact throughout.    · Gait:  No ataxia and can tandem gait 6 steps.    · Nystagmus - none present with left and right lateral gaze    · Giselle Hallpike - no torsional nystagmus    · Head impulse testing: no evidence of catch up saccades    · Test of vertical skew: no abnormal visual skew with covering of left or right eye       Audiogram     Audiogram, 03/03/2022 was independently reviewed           Labs, 1/4/22  Cr 0.9  Glucose 97  Wbc 4.64  hgb 16.4  plt 266       Assessment     imbalance    Plan:      Dizziness is an extremely complex problem that may arise from many sources.  I requires the coordination between the visual system, the vestibular system as well as the proprioreceptive system.  Additionally, balance is compromised in the setting of musculoskeletal, cerebral, cardiac, and numerous physiologic disorders.  The complex interplay between these systems may also lead to dizziness if there is dysynchrony  between the bilateral vestibular symptoms.    Central vestibular symptoms can generally be distinguished from peripheral vestibulopathies by the presence of other non vestibular neurologic symptoms (focal weakness, headache), light headedness (rather than true vertigo), near syncope, weak limbs, panic, fuzziness/cloudiness in mentation, and clumsiness.  Peripheral vestibulopathies are generally, at some point during their course, characterized by a true vertiginous sensation of movement, difficulty with sudden head movements, nausea, difficulty with sudden head movement, and possibly oscicllopsia.     Based on the history and physical exam, I suspect the etiology is more likely central in origin and less likely related a peripheral vestibular dysfunction.  I recommend neurology evaluation. Return to clinic if any change in symptoms        Nino Sandoval MD  [facility]   [facility] - [address]  [place] [id number]  P: [phone]  F: [phone]

## 2022-03-08 ENCOUNTER — PATIENT MESSAGE (OUTPATIENT)
Dept: OTOLARYNGOLOGY | Facility: CLINIC | Age: 32
End: 2022-03-08
Payer: COMMERCIAL

## 2022-03-08 DIAGNOSIS — R26.89 IMBALANCE: Primary | ICD-10-CM

## 2022-03-22 ENCOUNTER — OFFICE VISIT (OUTPATIENT)
Dept: NEUROLOGY | Facility: CLINIC | Age: 32
End: 2022-03-22
Payer: COMMERCIAL

## 2022-03-22 VITALS
SYSTOLIC BLOOD PRESSURE: 108 MMHG | DIASTOLIC BLOOD PRESSURE: 76 MMHG | BODY MASS INDEX: 31.94 KG/M2 | HEIGHT: 70 IN | RESPIRATION RATE: 16 BRPM | WEIGHT: 223.13 LBS | OXYGEN SATURATION: 99 % | HEART RATE: 62 BPM

## 2022-03-22 DIAGNOSIS — R29.818 OTHER SYMPTOMS AND SIGNS INVOLVING THE NERVOUS SYSTEM: ICD-10-CM

## 2022-03-22 DIAGNOSIS — R26.89 IMBALANCE: Primary | ICD-10-CM

## 2022-03-22 DIAGNOSIS — I10 PRIMARY HYPERTENSION: ICD-10-CM

## 2022-03-22 DIAGNOSIS — F41.0 PANIC DISORDER WITHOUT AGORAPHOBIA WITH MILD PANIC ATTACKS: ICD-10-CM

## 2022-03-22 DIAGNOSIS — R94.31 EKG ABNORMALITIES: ICD-10-CM

## 2022-03-22 DIAGNOSIS — R07.89 OTHER CHEST PAIN: ICD-10-CM

## 2022-03-22 DIAGNOSIS — R90.82 CORPUS CALLOSUM WHITE MATTER ABNORMALITIES PRESENT ON MRI: ICD-10-CM

## 2022-03-22 DIAGNOSIS — I47.9 TACHYCARDIA, PAROXYSMAL: ICD-10-CM

## 2022-03-22 PROCEDURE — 1159F PR MEDICATION LIST DOCUMENTED IN MEDICAL RECORD: ICD-10-PCS | Mod: CPTII,S$GLB,, | Performed by: PSYCHIATRY & NEUROLOGY

## 2022-03-22 PROCEDURE — 3078F PR MOST RECENT DIASTOLIC BLOOD PRESSURE < 80 MM HG: ICD-10-PCS | Mod: CPTII,S$GLB,, | Performed by: PSYCHIATRY & NEUROLOGY

## 2022-03-22 PROCEDURE — 1159F MED LIST DOCD IN RCRD: CPT | Mod: CPTII,S$GLB,, | Performed by: PSYCHIATRY & NEUROLOGY

## 2022-03-22 PROCEDURE — 99205 OFFICE O/P NEW HI 60 MIN: CPT | Mod: S$GLB,,, | Performed by: PSYCHIATRY & NEUROLOGY

## 2022-03-22 PROCEDURE — 3074F PR MOST RECENT SYSTOLIC BLOOD PRESSURE < 130 MM HG: ICD-10-PCS | Mod: CPTII,S$GLB,, | Performed by: PSYCHIATRY & NEUROLOGY

## 2022-03-22 PROCEDURE — 3008F BODY MASS INDEX DOCD: CPT | Mod: CPTII,S$GLB,, | Performed by: PSYCHIATRY & NEUROLOGY

## 2022-03-22 PROCEDURE — 99205 PR OFFICE/OUTPT VISIT, NEW, LEVL V, 60-74 MIN: ICD-10-PCS | Mod: S$GLB,,, | Performed by: PSYCHIATRY & NEUROLOGY

## 2022-03-22 PROCEDURE — 3078F DIAST BP <80 MM HG: CPT | Mod: CPTII,S$GLB,, | Performed by: PSYCHIATRY & NEUROLOGY

## 2022-03-22 PROCEDURE — 3074F SYST BP LT 130 MM HG: CPT | Mod: CPTII,S$GLB,, | Performed by: PSYCHIATRY & NEUROLOGY

## 2022-03-22 PROCEDURE — 3008F PR BODY MASS INDEX (BMI) DOCUMENTED: ICD-10-PCS | Mod: CPTII,S$GLB,, | Performed by: PSYCHIATRY & NEUROLOGY

## 2022-03-22 PROCEDURE — 99999 PR PBB SHADOW E&M-EST. PATIENT-LVL V: CPT | Mod: PBBFAC,,, | Performed by: PSYCHIATRY & NEUROLOGY

## 2022-03-22 PROCEDURE — 99999 PR PBB SHADOW E&M-EST. PATIENT-LVL V: ICD-10-PCS | Mod: PBBFAC,,, | Performed by: PSYCHIATRY & NEUROLOGY

## 2022-03-22 RX ORDER — ACETAMINOPHEN 500 MG
500 TABLET ORAL EVERY 6 HOURS PRN
COMMUNITY
End: 2022-04-29

## 2022-03-22 NOTE — PROGRESS NOTES
"Subjective:       Patient ID: Alphonse Claude Mackey III is a 31 y.o. male.    Chief Complaint: Dizziness and Neurologic Problem          HPI     The patient was referred by Dr. Sandoval for "dizziness".     The patient reports longstanding history of feeling off balance that became prominent in the last 3 months (). Denies spinning or lightheadedness. Describes his symptom as feeling off balance while walking especially when he moves fast (not his head). The feeling comes multiple times a day, not with every time he walks. Sitting and stopping alleviates the feeling in addition to talking and being mentally engaged. No associated neurological symptoms. No weakness, numbness, loss of hearing or vision, tremors, trouble swallowing or double vision. Endorses occasional mild headaches that respond to Tylenol.  No trauma. Has significant history of ROZINA. ENT evaluation and Cardiology evaluation were normal. On 01- CBC, CMP, TFT NL. On 10- EKG, TTE and Holter NL. On 03- Audiology evaluation was NL.            Review of Systems   Constitutional: Negative for appetite change and fatigue.   HENT: Negative for hearing loss and tinnitus.    Eyes: Negative for photophobia and visual disturbance.   Respiratory: Negative for apnea and shortness of breath.    Cardiovascular: Negative for chest pain and palpitations.   Gastrointestinal: Negative for nausea and vomiting.   Endocrine: Negative for cold intolerance and heat intolerance.   Genitourinary: Negative for difficulty urinating and urgency.   Musculoskeletal: Negative for arthralgias, back pain, gait problem, joint swelling, myalgias, neck pain and neck stiffness.   Skin: Negative for color change and rash.   Allergic/Immunologic: Negative for environmental allergies and immunocompromised state.   Neurological: Positive for light-headedness. Negative for dizziness, tremors, seizures, syncope, facial asymmetry, speech difficulty, weakness, numbness " and headaches.   Hematological: Negative for adenopathy. Does not bruise/bleed easily.   Psychiatric/Behavioral: Negative for agitation, behavioral problems, confusion, decreased concentration, dysphoric mood, hallucinations, self-injury, sleep disturbance and suicidal ideas. The patient is nervous/anxious. The patient is not hyperactive.                  Current Outpatient Medications:     acetaminophen (TYLENOL) 500 MG tablet, Take 500 mg by mouth every 6 (six) hours as needed., Disp: , Rfl:     multivitamin capsule, Take 1 capsule by mouth once daily., Disp: , Rfl:     propranoloL (INDERAL) 10 MG tablet, Take 1 tablet (10 mg total) by mouth 2 (two) times daily., Disp: 180 tablet, Rfl: 0  Past Medical History:   Diagnosis Date    Anxiety     Primary hypertension 1/6/2022     Past Surgical History:   Procedure Laterality Date    ADENOIDECTOMY      TONSILLECTOMY       Social History     Socioeconomic History    Marital status: Single   Tobacco Use    Smoking status: Never Smoker    Smokeless tobacco: Never Used   Substance and Sexual Activity    Alcohol use: Never    Drug use: Never    Sexual activity: Not Currently   Social History Narrative    No smokers or pets in household.             Past/Current Medical/Surgical History, Past/Current Social History, Past/Current Family History and Past/Current Medications were reviewed in detail.        Objective:           VITAL SIGNS WERE REVIEWED      GENERAL APPEARANCE:     The patient looks comfortable.    BMI 32    No signs of respiratory distress.    Normal breathing pattern.    No dysmorphic features    Normal eye contact.     GENERAL MEDICAL EXAM:    HEENT:  Head is atraumatic normocephalic. Fundoscopic (Ophthalmoscopic) exam showed no disc edema.      Neck and Axillae: No JVD. No visible lesions.    Cardiopulmonary: No cyanosis. No tachypnea. Normal respiratory effort.    Gastrointestinal/Urogenital:  No jaundice. No stomas or lesions. No visible  hernias. No catheters.     Skin, Hair and Nails: No pathognonomic skin rash. No neurofibromatosis. No visible lesions.No stigmata of autoimmune disease. No clubbing.    Limbs: No varicose veins. No visible swelling.    Muskoskeletal: No visible deformities.No visible lesions.           Neurologic Exam     Mental Status   Oriented to person, place, and time.   Follows 3 step commands.   Attention: normal. Concentration: normal.   Speech: speech is normal   Level of consciousness: alert  Knowledge: good.   Able to name object. Able to repeat. Normal comprehension.     Cranial Nerves   Cranial nerves II through XII intact.     CN II   Visual fields full to confrontation.   Visual acuity: normal  Right visual field deficit: none  Left visual field deficit: none     CN III, IV, VI   Pupils are equal, round, and reactive to light.  Extraocular motions are normal.   Right pupil: Size: 2 mm. Shape: regular. Reactivity: brisk. Consensual response: intact. Accommodation: intact.   Left pupil: Size: 2 mm. Shape: regular. Reactivity: brisk. Consensual response: intact. Accommodation: intact.   CN III: no CN III palsy  CN VI: no CN VI palsy  Nystagmus: none   Diplopia: none  Ophthalmoparesis: none  Upgaze: normal  Downgaze: normal  Conjugate gaze: present  Vestibulo-ocular reflex: present    CN V   Facial sensation intact.   Right facial sensation deficit: none  Left facial sensation deficit: none    CN VII   Facial expression full, symmetric.   Right facial weakness: none  Left facial weakness: none    CN VIII   CN VIII normal.   Hearing: intact    CN IX, X   CN IX normal.   CN X normal.   Palate: symmetric    CN XI   CN XI normal.   Right sternocleidomastoid strength: normal  Left sternocleidomastoid strength: normal  Right trapezius strength: normal  Left trapezius strength: normal    CN XII   CN XII normal.   Tongue: not atrophic  Fasciculations: absent  Tongue deviation: none    Motor Exam   Muscle bulk: normal  Overall  muscle tone: normal  Right arm tone: normal  Left arm tone: normal  Right arm pronator drift: absent  Left arm pronator drift: absent  Right leg tone: normal  Left leg tone: normal    Strength   Strength 5/5 throughout.   Right neck flexion: 5/5  Left neck flexion: 5/5  Right neck extension: 5/5  Left neck extension: 5/5  Right deltoid: 5/5  Left deltoid: 5/5  Right biceps: 5/5  Left biceps: 5/5  Right triceps: 5/5  Left triceps: 5/5  Right wrist flexion: 5/5  Left wrist flexion: 5/5  Right wrist extension: 5/5  Left wrist extension: 5/5  Right interossei: 5/5  Left interossei: 5/5  Right iliopsoas: 5/5  Left iliopsoas: 5/5  Right quadriceps: 5/5  Left quadriceps: 5/5  Right hamstrin/5  Left hamstrin/5  Right glutei: 5/5  Left glutei: 5/5  Right anterior tibial: 5/5  Left anterior tibial: 5/5  Right posterior tibial: 5/5  Left posterior tibial: 5/5  Right peroneal: 5/5  Left peroneal: 5/5  Right gastroc: 5/5  Left gastroc: 5/5    Sensory Exam   Light touch normal.   Right arm light touch: normal  Left arm light touch: normal  Right leg light touch: normal  Left leg light touch: normal  Vibration normal.   Right arm vibration: normal  Left arm vibration: normal  Right leg vibration: normal  Left leg vibration: normal  Proprioception normal.   Right arm proprioception: normal  Left arm proprioception: normal  Right leg proprioception: normal  Left leg proprioception: normal  Pinprick normal.   Right arm pinprick: normal  Left arm pinprick: normal  Right leg pinprick: normal  Left leg pinprick: normal  Graphesthesia: normal  Stereognosis: normal    Gait, Coordination, and Reflexes     Gait  Gait: normal    Coordination   Romberg: negative  Finger to nose coordination: normal  Heel to shin coordination: normal  Tandem walking coordination: normal    Tremor   Resting tremor: absent  Intention tremor: absent  Action tremor: absent    Reflexes   Right brachioradialis: 2+  Left brachioradialis: 2+  Right biceps:  2+  Left biceps: 2+  Right triceps: 2+  Left triceps: 2+  Right patellar: 2+  Left patellar: 2+  Right achilles: 2+  Left achilles: 2+  Right plantar: normal  Left plantar: normal  Right Hinojosa: absent  Left Hinojosa: absent  Right ankle clonus: absent  Left ankle clonus: absent  Right pendular knee jerk: absent  Left pendular knee jerk: absent      Lab Results   Component Value Date    WBC 4.64 01/04/2022    HGB 16.4 01/04/2022    HCT 48.3 01/04/2022    MCV 87 01/04/2022     01/04/2022     Sodium   Date Value Ref Range Status   01/04/2022 138 136 - 145 mmol/L Final     Potassium   Date Value Ref Range Status   01/04/2022 4.4 3.5 - 5.1 mmol/L Final     Chloride   Date Value Ref Range Status   01/04/2022 100 95 - 110 mmol/L Final     CO2   Date Value Ref Range Status   01/04/2022 29 23 - 29 mmol/L Final     Glucose   Date Value Ref Range Status   01/04/2022 97 70 - 110 mg/dL Final     BUN   Date Value Ref Range Status   01/04/2022 13 6 - 20 mg/dL Final     Creatinine   Date Value Ref Range Status   01/04/2022 0.9 0.5 - 1.4 mg/dL Final     Calcium   Date Value Ref Range Status   01/04/2022 10.0 8.7 - 10.5 mg/dL Final     Total Protein   Date Value Ref Range Status   01/04/2022 8.2 6.0 - 8.4 g/dL Final     Albumin   Date Value Ref Range Status   01/04/2022 4.6 3.5 - 5.2 g/dL Final     Total Bilirubin   Date Value Ref Range Status   01/04/2022 0.7 0.1 - 1.0 mg/dL Final     Comment:     For infants and newborns, interpretation of results should be based  on gestational age, weight and in agreement with clinical  observations.    Premature Infant recommended reference ranges:  Up to 24 hours.............<8.0 mg/dL  Up to 48 hours............<12.0 mg/dL  3-5 days..................<15.0 mg/dL  6-29 days.................<15.0 mg/dL       Alkaline Phosphatase   Date Value Ref Range Status   01/04/2022 49 (L) 55 - 135 U/L Final     AST   Date Value Ref Range Status   01/04/2022 27 10 - 40 U/L Final     ALT   Date Value  Ref Range Status   01/04/2022 33 10 - 44 U/L Final     Anion Gap   Date Value Ref Range Status   01/04/2022 9 8 - 16 mmol/L Final     eGFR if    Date Value Ref Range Status   01/04/2022 >60.0 >60 mL/min/1.73 m^2 Final     eGFR if non    Date Value Ref Range Status   01/04/2022 >60.0 >60 mL/min/1.73 m^2 Final     Comment:     Calculation used to obtain the estimated glomerular filtration  rate (eGFR) is the CKD-EPI equation.        No results found for: TTLYBXSK22  Lab Results   Component Value Date    TSH 1.415 01/04/2022 01-    CBC, CMP, TFT NL    0-    EKG, TTE and Holter NL     03-    Audiology evaluation was NL.      04-    CTA H/N NL       04-    Brain MRI CC Splenial T2 signal          Reviewed the neuroimaging independently       Assessment:       1. Imbalance    2. Panic disorder without agoraphobia with mild panic attacks    3. Tachycardia, paroxysmal    4. Primary hypertension    5. Other chest pain    6. EKG abnormalities    7. Other symptoms and signs involving the nervous system        Plan:                 PERCEPTUAL PAROXYSMAL IMBALANCE     UNCLEAR ETIOLOGY, ATYPICAL, POSSIBLE ROZINA SPECTRUM       Evaluate Brain MRI WWO R/O CVD and  Demyelination.    CTA H/N R/O VBI and IC/EC dissection or malformations.                   MEDICAL/SURGICAL COMORBIDITIES     All relevant medical comorbidities noted and managed by primary care physician and medical care team.          HEALTHY LIFESTYLE AND PREVENTATIVE CARE    The patient to adhere to the age-appropriate health maintenance guidelines including screening tests and vaccinations. The patient to adhere to  healthy lifestyle, optimal weight, exercise, healthy diet, good sleep hygiene and avoiding drugs including smoking, alcohol and recreational drugs.          Rafael Barr MD, FAAN    Attending Neurologist/Epileptologist         Diplomate, American Board of Psychiatry and  Neurology    Diplomate, American Board of Clinical Neurophysiology     Fellow, American Academy of Neurology         F-F 80  >50% C

## 2022-03-24 ENCOUNTER — OFFICE VISIT (OUTPATIENT)
Dept: CARDIOLOGY | Facility: CLINIC | Age: 32
End: 2022-03-24
Payer: COMMERCIAL

## 2022-03-24 VITALS
DIASTOLIC BLOOD PRESSURE: 84 MMHG | SYSTOLIC BLOOD PRESSURE: 122 MMHG | WEIGHT: 225.5 LBS | HEART RATE: 68 BPM | BODY MASS INDEX: 32.36 KG/M2 | OXYGEN SATURATION: 99 %

## 2022-03-24 DIAGNOSIS — R26.89 IMBALANCE: ICD-10-CM

## 2022-03-24 DIAGNOSIS — I10 PRIMARY HYPERTENSION: Primary | ICD-10-CM

## 2022-03-24 DIAGNOSIS — I47.9 TACHYCARDIA, PAROXYSMAL: ICD-10-CM

## 2022-03-24 PROCEDURE — 1160F RVW MEDS BY RX/DR IN RCRD: CPT | Mod: CPTII,S$GLB,, | Performed by: INTERNAL MEDICINE

## 2022-03-24 PROCEDURE — 99999 PR PBB SHADOW E&M-EST. PATIENT-LVL III: CPT | Mod: PBBFAC,,, | Performed by: INTERNAL MEDICINE

## 2022-03-24 PROCEDURE — 99999 PR PBB SHADOW E&M-EST. PATIENT-LVL III: ICD-10-PCS | Mod: PBBFAC,,, | Performed by: INTERNAL MEDICINE

## 2022-03-24 PROCEDURE — 99213 OFFICE O/P EST LOW 20 MIN: CPT | Mod: S$GLB,,, | Performed by: INTERNAL MEDICINE

## 2022-03-24 PROCEDURE — 3079F PR MOST RECENT DIASTOLIC BLOOD PRESSURE 80-89 MM HG: ICD-10-PCS | Mod: CPTII,S$GLB,, | Performed by: INTERNAL MEDICINE

## 2022-03-24 PROCEDURE — 1159F MED LIST DOCD IN RCRD: CPT | Mod: CPTII,S$GLB,, | Performed by: INTERNAL MEDICINE

## 2022-03-24 PROCEDURE — 3079F DIAST BP 80-89 MM HG: CPT | Mod: CPTII,S$GLB,, | Performed by: INTERNAL MEDICINE

## 2022-03-24 PROCEDURE — 1159F PR MEDICATION LIST DOCUMENTED IN MEDICAL RECORD: ICD-10-PCS | Mod: CPTII,S$GLB,, | Performed by: INTERNAL MEDICINE

## 2022-03-24 PROCEDURE — 3074F SYST BP LT 130 MM HG: CPT | Mod: CPTII,S$GLB,, | Performed by: INTERNAL MEDICINE

## 2022-03-24 PROCEDURE — 3074F PR MOST RECENT SYSTOLIC BLOOD PRESSURE < 130 MM HG: ICD-10-PCS | Mod: CPTII,S$GLB,, | Performed by: INTERNAL MEDICINE

## 2022-03-24 PROCEDURE — 99213 PR OFFICE/OUTPT VISIT, EST, LEVL III, 20-29 MIN: ICD-10-PCS | Mod: S$GLB,,, | Performed by: INTERNAL MEDICINE

## 2022-03-24 PROCEDURE — 3008F BODY MASS INDEX DOCD: CPT | Mod: CPTII,S$GLB,, | Performed by: INTERNAL MEDICINE

## 2022-03-24 PROCEDURE — 1160F PR REVIEW ALL MEDS BY PRESCRIBER/CLIN PHARMACIST DOCUMENTED: ICD-10-PCS | Mod: CPTII,S$GLB,, | Performed by: INTERNAL MEDICINE

## 2022-03-24 PROCEDURE — 3008F PR BODY MASS INDEX (BMI) DOCUMENTED: ICD-10-PCS | Mod: CPTII,S$GLB,, | Performed by: INTERNAL MEDICINE

## 2022-03-24 NOTE — PROGRESS NOTES
Subjective:   Patient ID:  Alphonse Claude Mackey III is a 31 y.o. male who presents for follow up of No chief complaint on file.      32 yo male, referred for abnormal EKG and HTN.   PMH upper back pain on PT. anxiety  Some chest tightness and mild dyspnea when walking. Occasional lightheadedness with high BP. Palpitation when anxious.  No faint and syncope. May wake up at night due to anxiety  Work out a lot bfore the neck pain. No smoking/drinking. Mother HTN. Father alcoholics and  of 34  ekg NSR and poor r progression on precordial leads.     Interval history  Will do brain MRI and CTA for dizziness[place] [facility] [place]  Taking Propranolol 10 mg bid. The palpitation and tachy improved.  Imbalance at walking.   Echo normal biv function ETT no ischemia and HOLTER normal           Past Medical History:   Diagnosis Date    Anxiety     Primary hypertension 2022       Past Surgical History:   Procedure Laterality Date    ADENOIDECTOMY      TONSILLECTOMY         Social History     Tobacco Use    Smoking status: Never Smoker    Smokeless tobacco: Never Used   Substance Use Topics    Alcohol use: Never    Drug use: Never       Family History   Problem Relation Age of Onset    Hypertension Mother     No Known Problems Sister     Obesity Paternal Grandmother     Cancer Paternal Grandfather          Review of Systems   Constitutional: Negative for decreased appetite, diaphoresis, fever, malaise/fatigue and night sweats.   HENT: Negative for nosebleeds.    Eyes: Negative for blurred vision and double vision.   Cardiovascular: Negative for chest pain, claudication, dyspnea on exertion, irregular heartbeat, leg swelling, near-syncope, orthopnea, palpitations, paroxysmal nocturnal dyspnea and syncope.   Respiratory: Negative for cough, shortness of breath, sleep disturbances due to breathing, snoring, sputum production and wheezing.    Endocrine: Negative for cold intolerance and polyuria.    Hematologic/Lymphatic: Does not bruise/bleed easily.   Skin: Negative for rash.   Musculoskeletal: Negative for back pain, falls, joint pain, joint swelling and neck pain.   Gastrointestinal: Negative for abdominal pain, heartburn, nausea and vomiting.   Genitourinary: Negative for dysuria, frequency and hematuria.   Neurological: Positive for dizziness. Negative for difficulty with concentration, focal weakness, headaches, light-headedness, numbness, seizures and weakness.   Psychiatric/Behavioral: Negative for depression, memory loss and substance abuse. The patient does not have insomnia.    Allergic/Immunologic: Negative for HIV exposure and hives.       Objective:   Physical Exam  HENT:      Head: Normocephalic.   Eyes:      Pupils: Pupils are equal, round, and reactive to light.   Neck:      Thyroid: No thyromegaly.      Vascular: Normal carotid pulses. No carotid bruit or JVD.   Cardiovascular:      Rate and Rhythm: Regular rhythm. Tachycardia present.  No extrasystoles are present.     Chest Wall: PMI is not displaced.      Pulses: Normal pulses.           Carotid pulses are 2+ on the right side and 2+ on the left side.     Heart sounds: Normal heart sounds. No murmur heard.    No gallop. No S3 sounds.   Pulmonary:      Effort: No respiratory distress.      Breath sounds: Normal breath sounds. No stridor.   Abdominal:      General: Bowel sounds are normal.      Palpations: Abdomen is soft.      Tenderness: There is no abdominal tenderness. There is no rebound.   Musculoskeletal:         General: Normal range of motion.   Skin:     Findings: No rash.   Neurological:      Mental Status: He is alert and oriented to person, place, and time.   Psychiatric:         Behavior: Behavior normal.         Lab Results   Component Value Date    CHOL 182 01/04/2022    CHOL 186 12/10/2020    CHOL 166 06/01/2018     Lab Results   Component Value Date    HDL 53 01/04/2022    HDL 61 12/10/2020    HDL 59 06/01/2018     Lab  Results   Component Value Date    LDLCALC 120.6 01/04/2022    LDLCALC 115.0 12/10/2020    LDLCALC 101.4 06/01/2018     Lab Results   Component Value Date    TRIG 42 01/04/2022    TRIG 50 12/10/2020    TRIG 28 (L) 06/01/2018     Lab Results   Component Value Date    CHOLHDL 29.1 01/04/2022    CHOLHDL 32.8 12/10/2020    CHOLHDL 35.5 06/01/2018       Chemistry        Component Value Date/Time     01/04/2022 1245    K 4.4 01/04/2022 1245     01/04/2022 1245    CO2 29 01/04/2022 1245    BUN 13 01/04/2022 1245    CREATININE 0.9 01/04/2022 1245    GLU 97 01/04/2022 1245        Component Value Date/Time    CALCIUM 10.0 01/04/2022 1245    ALKPHOS 49 (L) 01/04/2022 1245    AST 27 01/04/2022 1245    ALT 33 01/04/2022 1245    BILITOT 0.7 01/04/2022 1245    ESTGFRAFRICA >60.0 01/04/2022 1245    EGFRNONAA >60.0 01/04/2022 1245          No results found for: LABA1C, HGBA1C  Lab Results   Component Value Date    TSH 1.415 01/04/2022     No results found for: INR, PROTIME  Lab Results   Component Value Date    WBC 4.64 01/04/2022    HGB 16.4 01/04/2022    HCT 48.3 01/04/2022    MCV 87 01/04/2022     01/04/2022     BMP  Sodium   Date Value Ref Range Status   01/04/2022 138 136 - 145 mmol/L Final     Potassium   Date Value Ref Range Status   01/04/2022 4.4 3.5 - 5.1 mmol/L Final     Chloride   Date Value Ref Range Status   01/04/2022 100 95 - 110 mmol/L Final     CO2   Date Value Ref Range Status   01/04/2022 29 23 - 29 mmol/L Final     BUN   Date Value Ref Range Status   01/04/2022 13 6 - 20 mg/dL Final     Creatinine   Date Value Ref Range Status   01/04/2022 0.9 0.5 - 1.4 mg/dL Final     Calcium   Date Value Ref Range Status   01/04/2022 10.0 8.7 - 10.5 mg/dL Final     Anion Gap   Date Value Ref Range Status   01/04/2022 9 8 - 16 mmol/L Final     eGFR if    Date Value Ref Range Status   01/04/2022 >60.0 >60 mL/min/1.73 m^2 Final     eGFR if non    Date Value Ref Range Status    01/04/2022 >60.0 >60 mL/min/1.73 m^2 Final     Comment:     Calculation used to obtain the estimated glomerular filtration  rate (eGFR) is the CKD-EPI equation.        BNP  @LABRCNTIP(BNP,BNPTRIAGEBLO)@  @LABRCNTIP(troponini)@  CrCl cannot be calculated (Patient's most recent lab result is older than the maximum 7 days allowed.).  No results found in the last 24 hours.  No results found in the last 24 hours.  No results found in the last 24 hours.    Assessment:      1. Primary hypertension    2. Tachycardia, paroxysmal    3. Imbalance      HTN TAHCY and palpitation controlled    Plan:   Continue propranolol   [place] [facility] [place]    Counseled DASH  Check Lipid profile in 6 months  Recommend heart-healthy diet, weight control and regular exercise.  Cynthia. Risk modification.   I have reviewed all pertinent labs and cardiac studies independently. Plans and recommendations have been formulated under my direct supervision. All questions answered and patient voiced understanding.   If symptoms persist go to the ED  F/u with PCP

## 2022-03-29 ENCOUNTER — OFFICE VISIT (OUTPATIENT)
Dept: INTERNAL MEDICINE | Facility: CLINIC | Age: 32
End: 2022-03-29
Payer: COMMERCIAL

## 2022-03-29 VITALS
HEART RATE: 75 BPM | WEIGHT: 221.13 LBS | TEMPERATURE: 98 F | BODY MASS INDEX: 31.73 KG/M2 | SYSTOLIC BLOOD PRESSURE: 120 MMHG | DIASTOLIC BLOOD PRESSURE: 88 MMHG | OXYGEN SATURATION: 98 %

## 2022-03-29 DIAGNOSIS — K62.89 ANUS IRRITATION: ICD-10-CM

## 2022-03-29 DIAGNOSIS — S39.011A STRAIN OF ABDOMINAL WALL, INITIAL ENCOUNTER: Primary | ICD-10-CM

## 2022-03-29 PROCEDURE — 3079F DIAST BP 80-89 MM HG: CPT | Mod: CPTII,S$GLB,, | Performed by: INTERNAL MEDICINE

## 2022-03-29 PROCEDURE — 1159F PR MEDICATION LIST DOCUMENTED IN MEDICAL RECORD: ICD-10-PCS | Mod: CPTII,S$GLB,, | Performed by: INTERNAL MEDICINE

## 2022-03-29 PROCEDURE — 99999 PR PBB SHADOW E&M-EST. PATIENT-LVL III: CPT | Mod: PBBFAC,,, | Performed by: INTERNAL MEDICINE

## 2022-03-29 PROCEDURE — 3008F BODY MASS INDEX DOCD: CPT | Mod: CPTII,S$GLB,, | Performed by: INTERNAL MEDICINE

## 2022-03-29 PROCEDURE — 99214 PR OFFICE/OUTPT VISIT, EST, LEVL IV, 30-39 MIN: ICD-10-PCS | Mod: S$GLB,,, | Performed by: INTERNAL MEDICINE

## 2022-03-29 PROCEDURE — 99214 OFFICE O/P EST MOD 30 MIN: CPT | Mod: S$GLB,,, | Performed by: INTERNAL MEDICINE

## 2022-03-29 PROCEDURE — 3008F PR BODY MASS INDEX (BMI) DOCUMENTED: ICD-10-PCS | Mod: CPTII,S$GLB,, | Performed by: INTERNAL MEDICINE

## 2022-03-29 PROCEDURE — 3074F PR MOST RECENT SYSTOLIC BLOOD PRESSURE < 130 MM HG: ICD-10-PCS | Mod: CPTII,S$GLB,, | Performed by: INTERNAL MEDICINE

## 2022-03-29 PROCEDURE — 99999 PR PBB SHADOW E&M-EST. PATIENT-LVL III: ICD-10-PCS | Mod: PBBFAC,,, | Performed by: INTERNAL MEDICINE

## 2022-03-29 PROCEDURE — 1159F MED LIST DOCD IN RCRD: CPT | Mod: CPTII,S$GLB,, | Performed by: INTERNAL MEDICINE

## 2022-03-29 PROCEDURE — 3074F SYST BP LT 130 MM HG: CPT | Mod: CPTII,S$GLB,, | Performed by: INTERNAL MEDICINE

## 2022-03-29 PROCEDURE — 3079F PR MOST RECENT DIASTOLIC BLOOD PRESSURE 80-89 MM HG: ICD-10-PCS | Mod: CPTII,S$GLB,, | Performed by: INTERNAL MEDICINE

## 2022-03-29 RX ORDER — CYCLOBENZAPRINE HCL 10 MG
TABLET ORAL
Qty: 30 TABLET | Refills: 0 | Status: CANCELLED | OUTPATIENT
Start: 2022-03-29

## 2022-03-29 RX ORDER — METHOCARBAMOL 500 MG/1
TABLET, FILM COATED ORAL
Qty: 40 TABLET | Refills: 0 | Status: SHIPPED | OUTPATIENT
Start: 2022-03-29 | End: 2022-04-29

## 2022-03-29 NOTE — PROGRESS NOTES
Subjective:      Patient ID: Alphonse Claude Mackey III is a 31 y.o. male.    Chief Complaint: Follow-up    HPI  Review of Systems  Objective:   /88 (BP Location: Left arm, Patient Position: Sitting, BP Method: Large (Manual))   Pulse 75   Temp 97.6 °F (36.4 °C) (Tympanic)   Wt 100.3 kg (221 lb 1.9 oz)   SpO2 98%   BMI 31.73 kg/m²     Physical Exam    Assessment:     No diagnosis found.  Plan:   There are no diagnoses linked to this encounter.    Lab Frequency Next Occurrence   Ambulatory referral/consult to Physical/Occupational Therapy Once 12/06/2021   Ambulatory referral/consult to Psychiatry Once 01/11/2022   MRI Brain Demyelinating W W/O Contrast Once 03/22/2022   CTA Head and Neck (xpd) Once 03/22/2022       Problem List Items Addressed This Visit    None         No follow-ups on file.

## 2022-03-29 NOTE — PROGRESS NOTES
Subjective:      Patient ID: Alphonse Claude Mackey III is a 31 y.o. male.    Chief Complaint: Follow-up    HPI     30 yo with   Patient Active Problem List   Diagnosis    Panic disorder without agoraphobia with mild panic attacks    Prolapsed internal hemorrhoids, grade 2    Cervical pain    Primary hypertension    Tachycardia, paroxysmal    Other chest pain    EKG abnormalities    Imbalance     Past Medical History:   Diagnosis Date    Anxiety     Primary hypertension 1/6/2022     Here today c/o feeling a pulling sensation to right abdominal wall on march 25th during dead lift exercise. Still similar sensation with sitting up. Has stopped working out. Minimally improved.   No painful bms. No constipation.     Pt also c/o sensation that something in on his anus. No bleeding. No itching. No leakage.no treatment. Mildly improved but still feels when sitting or walking. Does not feel any bumps.     Pt also feels pressure sensation to right pubic and  inguinal area. Pt concerned for poss hernia.     Review of Systems   Constitutional: Negative for chills and fever.   HENT: Negative for ear pain and sore throat.    Respiratory: Negative for cough.    Cardiovascular: Negative for chest pain.   Gastrointestinal: Negative for abdominal pain and blood in stool.   Genitourinary: Negative for dysuria and hematuria.   Neurological: Negative for seizures and syncope.     Objective:   /88 (BP Location: Left arm, Patient Position: Sitting, BP Method: Large (Manual))   Pulse 75   Temp 97.6 °F (36.4 °C) (Tympanic)   Wt 100.3 kg (221 lb 1.9 oz)   SpO2 98%   BMI 31.73 kg/m²     Physical Exam  Constitutional:       General: He is not in acute distress.     Appearance: He is well-developed.   HENT:      Head: Normocephalic and atraumatic.   Eyes:      Pupils: Pupils are equal, round, and reactive to light.   Neck:      Thyroid: No thyromegaly.   Cardiovascular:      Rate and Rhythm: Normal rate and regular rhythm.    Pulmonary:      Breath sounds: Normal breath sounds. No wheezing or rales.   Abdominal:      General: Bowel sounds are normal. There is no distension.      Palpations: Abdomen is soft. There is no mass.      Tenderness: There is no abdominal tenderness. There is no right CVA tenderness, left CVA tenderness, guarding or rebound.      Hernia: No hernia is present. There is no hernia in the left inguinal area or right inguinal area.   Musculoskeletal:      Cervical back: Neck supple.   Lymphadenopathy:      Cervical: No cervical adenopathy.      Lower Body: No right inguinal adenopathy. No left inguinal adenopathy.   Skin:     General: Skin is warm and dry.   Neurological:      Mental Status: He is alert and oriented to person, place, and time.   Psychiatric:         Mood and Affect: Mood normal.         Behavior: Behavior normal.     no anal lesions or masses.     Assessment:     1. Strain of abdominal wall, initial encounter    2. Anus irritation      Plan:   Strain of abdominal wall, initial encounter  -     methocarbamoL (ROBAXIN) 500 MG Tab; 1 to 2 tabs po qhs prn muscle spasm  Dispense: 40 tablet; Refill: 0    Anus irritation    improving exam normal. Consider gi referral if no resolution.       Lab Frequency Next Occurrence   Ambulatory referral/consult to Physical/Occupational Therapy Once 12/06/2021   Ambulatory referral/consult to Psychiatry Once 01/11/2022   MRI Brain Demyelinating W W/O Contrast Once 03/22/2022   CTA Head and Neck (xpd) Once 03/22/2022       Problem List Items Addressed This Visit    None     Visit Diagnoses     Strain of abdominal wall, initial encounter    -  Primary    Relevant Medications    methocarbamoL (ROBAXIN) 500 MG Tab    Anus irritation              Follow up if symptoms worsen or fail to improve.

## 2022-04-21 ENCOUNTER — PATIENT MESSAGE (OUTPATIENT)
Dept: INTERNAL MEDICINE | Facility: CLINIC | Age: 32
End: 2022-04-21
Payer: COMMERCIAL

## 2022-04-21 DIAGNOSIS — I47.9 TACHYCARDIA, PAROXYSMAL: ICD-10-CM

## 2022-04-21 DIAGNOSIS — I10 PRIMARY HYPERTENSION: ICD-10-CM

## 2022-04-21 RX ORDER — PROPRANOLOL HYDROCHLORIDE 10 MG/1
10 TABLET ORAL 2 TIMES DAILY
Qty: 180 TABLET | Refills: 0 | Status: SHIPPED | OUTPATIENT
Start: 2022-04-21 | End: 2022-04-25 | Stop reason: SDUPTHER

## 2022-04-21 NOTE — TELEPHONE ENCOUNTER
No new care gaps identified.  Powered by [employer]. Reference number: [id number].   4/21/2022 9:56:15 AM CDT

## 2022-04-24 ENCOUNTER — PATIENT MESSAGE (OUTPATIENT)
Dept: INTERNAL MEDICINE | Facility: CLINIC | Age: 32
End: 2022-04-24
Payer: COMMERCIAL

## 2022-04-25 ENCOUNTER — PATIENT MESSAGE (OUTPATIENT)
Dept: INTERNAL MEDICINE | Facility: CLINIC | Age: 32
End: 2022-04-25
Payer: COMMERCIAL

## 2022-04-25 DIAGNOSIS — I10 PRIMARY HYPERTENSION: ICD-10-CM

## 2022-04-25 DIAGNOSIS — I47.9 TACHYCARDIA, PAROXYSMAL: ICD-10-CM

## 2022-04-25 RX ORDER — PROPRANOLOL HYDROCHLORIDE 10 MG/1
10 TABLET ORAL 2 TIMES DAILY
Qty: 180 TABLET | Refills: 0 | Status: SHIPPED | OUTPATIENT
Start: 2022-04-25 | End: 2022-04-29 | Stop reason: SDUPTHER

## 2022-04-25 NOTE — TELEPHONE ENCOUNTER
No new care gaps identified.  Powered by [employer]. Reference number: [id number].   4/25/2022 8:42:03 AM CDT

## 2022-04-26 ENCOUNTER — TELEPHONE (OUTPATIENT)
Dept: NEUROLOGY | Facility: CLINIC | Age: 32
End: 2022-04-26
Payer: COMMERCIAL

## 2022-04-26 ENCOUNTER — HOSPITAL ENCOUNTER (OUTPATIENT)
Dept: RADIOLOGY | Facility: HOSPITAL | Age: 32
Discharge: HOME | End: 2022-04-26
Attending: PSYCHIATRY & NEUROLOGY
Payer: COMMERCIAL

## 2022-04-26 ENCOUNTER — PATIENT MESSAGE (OUTPATIENT)
Dept: NEUROLOGY | Facility: CLINIC | Age: 32
End: 2022-04-26
Payer: COMMERCIAL

## 2022-04-26 DIAGNOSIS — R26.89 IMBALANCE: ICD-10-CM

## 2022-04-26 DIAGNOSIS — R29.818 OTHER SYMPTOMS AND SIGNS INVOLVING THE NERVOUS SYSTEM: ICD-10-CM

## 2022-04-26 PROCEDURE — 70496 CT ANGIOGRAPHY HEAD: CPT | Mod: TC

## 2022-04-26 PROCEDURE — 25500020 PHARM REV CODE 255: Performed by: PSYCHIATRY & NEUROLOGY

## 2022-04-26 PROCEDURE — 70553 MRI BRAIN STEM W/O & W/DYE: CPT | Mod: TC

## 2022-04-26 PROCEDURE — A9585 GADOBUTROL INJECTION: HCPCS | Performed by: PSYCHIATRY & NEUROLOGY

## 2022-04-26 RX ORDER — GADOBUTROL 604.72 MG/ML
10 INJECTION INTRAVENOUS
Status: COMPLETED | OUTPATIENT
Start: 2022-04-26 | End: 2022-04-26

## 2022-04-26 RX ADMIN — GADOBUTROL 10 ML: 604.72 INJECTION INTRAVENOUS at 03:04

## 2022-04-26 RX ADMIN — IOHEXOL 100 ML: 350 INJECTION, SOLUTION INTRAVENOUS at 02:04

## 2022-04-26 NOTE — TELEPHONE ENCOUNTER
----- Message from Rafael Barr MD sent at 4/26/2022  3:26 PM CDT -----    04-    [facility] H/N NL

## 2022-04-27 ENCOUNTER — TELEPHONE (OUTPATIENT)
Dept: NEUROLOGY | Facility: CLINIC | Age: 32
End: 2022-04-27
Payer: COMMERCIAL

## 2022-04-27 NOTE — TELEPHONE ENCOUNTER
----- Message from Rafael Barr MD sent at 4/26/2022  8:03 PM CDT -----    Brain MRI showed a signal change in the corpus callosum which connects the right and left hemisphere.     We can definitely review the films together and show him what we are talking about.    Such changes are commonly seen in metabolic changes like B complex deficiency,  exposure to alcohol, chemicals and elevated BP. It can also be seen with demyelinating diseases.    Will start with ordering B complex vitamins followed by further workup as we follow up.

## 2022-04-28 ENCOUNTER — PATIENT MESSAGE (OUTPATIENT)
Dept: OTOLARYNGOLOGY | Facility: CLINIC | Age: 32
End: 2022-04-28
Payer: COMMERCIAL

## 2022-04-29 ENCOUNTER — OFFICE VISIT (OUTPATIENT)
Dept: INTERNAL MEDICINE | Facility: CLINIC | Age: 32
End: 2022-04-29
Payer: COMMERCIAL

## 2022-04-29 ENCOUNTER — LAB VISIT (OUTPATIENT)
Dept: LAB | Facility: HOSPITAL | Age: 32
End: 2022-04-29
Attending: PSYCHIATRY & NEUROLOGY
Payer: COMMERCIAL

## 2022-04-29 VITALS
TEMPERATURE: 98 F | DIASTOLIC BLOOD PRESSURE: 85 MMHG | OXYGEN SATURATION: 99 % | SYSTOLIC BLOOD PRESSURE: 132 MMHG | HEART RATE: 93 BPM | BODY MASS INDEX: 31.06 KG/M2 | WEIGHT: 216.5 LBS

## 2022-04-29 DIAGNOSIS — I47.9 TACHYCARDIA, PAROXYSMAL: ICD-10-CM

## 2022-04-29 DIAGNOSIS — F41.0 PANIC DISORDER WITHOUT AGORAPHOBIA WITH MILD PANIC ATTACKS: ICD-10-CM

## 2022-04-29 DIAGNOSIS — R26.89 IMBALANCE: ICD-10-CM

## 2022-04-29 DIAGNOSIS — I10 PRIMARY HYPERTENSION: Primary | ICD-10-CM

## 2022-04-29 DIAGNOSIS — R90.82 CORPUS CALLOSUM WHITE MATTER ABNORMALITIES PRESENT ON MRI: ICD-10-CM

## 2022-04-29 DIAGNOSIS — R42 DISEQUILIBRIUM: ICD-10-CM

## 2022-04-29 DIAGNOSIS — R94.31 EKG ABNORMALITIES: ICD-10-CM

## 2022-04-29 DIAGNOSIS — I10 PRIMARY HYPERTENSION: ICD-10-CM

## 2022-04-29 DIAGNOSIS — R07.89 OTHER CHEST PAIN: ICD-10-CM

## 2022-04-29 DIAGNOSIS — R29.818 OTHER SYMPTOMS AND SIGNS INVOLVING THE NERVOUS SYSTEM: ICD-10-CM

## 2022-04-29 PROCEDURE — 3079F PR MOST RECENT DIASTOLIC BLOOD PRESSURE 80-89 MM HG: ICD-10-PCS | Mod: CPTII,S$GLB,, | Performed by: INTERNAL MEDICINE

## 2022-04-29 PROCEDURE — 84591 ASSAY OF NOS VITAMIN: CPT | Mod: 91 | Performed by: PSYCHIATRY & NEUROLOGY

## 2022-04-29 PROCEDURE — 99999 PR PBB SHADOW E&M-EST. PATIENT-LVL IV: CPT | Mod: PBBFAC,,, | Performed by: INTERNAL MEDICINE

## 2022-04-29 PROCEDURE — 3079F DIAST BP 80-89 MM HG: CPT | Mod: CPTII,S$GLB,, | Performed by: INTERNAL MEDICINE

## 2022-04-29 PROCEDURE — 99214 OFFICE O/P EST MOD 30 MIN: CPT | Mod: S$GLB,,, | Performed by: INTERNAL MEDICINE

## 2022-04-29 PROCEDURE — 83921 ORGANIC ACID SINGLE QUANT: CPT | Performed by: PSYCHIATRY & NEUROLOGY

## 2022-04-29 PROCEDURE — 3008F BODY MASS INDEX DOCD: CPT | Mod: CPTII,S$GLB,, | Performed by: INTERNAL MEDICINE

## 2022-04-29 PROCEDURE — 83090 ASSAY OF HOMOCYSTEINE: CPT | Performed by: PSYCHIATRY & NEUROLOGY

## 2022-04-29 PROCEDURE — 84425 ASSAY OF VITAMIN B-1: CPT | Performed by: PSYCHIATRY & NEUROLOGY

## 2022-04-29 PROCEDURE — 84591 ASSAY OF NOS VITAMIN: CPT | Performed by: PSYCHIATRY & NEUROLOGY

## 2022-04-29 PROCEDURE — 3075F SYST BP GE 130 - 139MM HG: CPT | Mod: CPTII,S$GLB,, | Performed by: INTERNAL MEDICINE

## 2022-04-29 PROCEDURE — 3075F PR MOST RECENT SYSTOLIC BLOOD PRESS GE 130-139MM HG: ICD-10-PCS | Mod: CPTII,S$GLB,, | Performed by: INTERNAL MEDICINE

## 2022-04-29 PROCEDURE — 99214 PR OFFICE/OUTPT VISIT, EST, LEVL IV, 30-39 MIN: ICD-10-PCS | Mod: S$GLB,,, | Performed by: INTERNAL MEDICINE

## 2022-04-29 PROCEDURE — 3008F PR BODY MASS INDEX (BMI) DOCUMENTED: ICD-10-PCS | Mod: CPTII,S$GLB,, | Performed by: INTERNAL MEDICINE

## 2022-04-29 PROCEDURE — 84207 ASSAY OF VITAMIN B-6: CPT | Performed by: PSYCHIATRY & NEUROLOGY

## 2022-04-29 PROCEDURE — 99999 PR PBB SHADOW E&M-EST. PATIENT-LVL IV: ICD-10-PCS | Mod: PBBFAC,,, | Performed by: INTERNAL MEDICINE

## 2022-04-29 PROCEDURE — 82607 VITAMIN B-12: CPT | Performed by: PSYCHIATRY & NEUROLOGY

## 2022-04-29 PROCEDURE — 82746 ASSAY OF FOLIC ACID SERUM: CPT | Performed by: PSYCHIATRY & NEUROLOGY

## 2022-04-29 PROCEDURE — 36415 COLL VENOUS BLD VENIPUNCTURE: CPT | Performed by: PSYCHIATRY & NEUROLOGY

## 2022-04-29 PROCEDURE — 84252 ASSAY OF VITAMIN B-2: CPT | Performed by: PSYCHIATRY & NEUROLOGY

## 2022-04-29 RX ORDER — PROPRANOLOL HYDROCHLORIDE 20 MG/1
20 TABLET ORAL 2 TIMES DAILY
Qty: 180 TABLET | Refills: 0 | Status: SHIPPED | OUTPATIENT
Start: 2022-04-29 | End: 2022-04-29 | Stop reason: SDUPTHER

## 2022-04-29 RX ORDER — PROPRANOLOL HYDROCHLORIDE 20 MG/1
20 TABLET ORAL 2 TIMES DAILY
Qty: 180 TABLET | Refills: 1 | Status: SHIPPED | OUTPATIENT
Start: 2022-04-29 | End: 2022-09-20

## 2022-04-29 NOTE — PROGRESS NOTES
"Subjective:      Patient ID: Alphonse Claude Mackey III is a 31 y.o. male.    Chief Complaint: Dizziness    HPI     30 yo with   Patient Active Problem List   Diagnosis    Panic disorder without agoraphobia with mild panic attacks    Prolapsed internal hemorrhoids, grade 2    Cervical pain    Primary hypertension    Tachycardia, paroxysmal    Other chest pain    EKG abnormalities    Imbalance     Past Medical History:   Diagnosis Date    Anxiety     Primary hypertension 1/6/2022     Here today for f/u on htn and "dizziness".  Has ENT and neuro following.       Feels wobbly feeling is getting worse. Daily now. Worse with walking. Improves but still can be present at rest. No syncope or falls. Has had increasing rosio non pulsatile tinnitus. Started b12 April 27th at recommendation of neuro.   Neurology department ordered labs for patient but did not schedule.    He has discussed with ent and planning for audiogram.     Does not feel dizziness with jumping rope or clean and press weight lifting exercise. Home  bp 130s/ 80s'      Review of Systems   Constitutional: Negative for chills and fever.   HENT: Negative for ear pain and sore throat.    Respiratory: Negative for cough, shortness of breath and wheezing.    Cardiovascular: Negative for chest pain and leg swelling.   Genitourinary: Negative for dysuria and hematuria.   Neurological: Negative for tremors, seizures, syncope, facial asymmetry, weakness and numbness.     Objective:   /85   Pulse 93   Temp 97.9 °F (36.6 °C) (Tympanic)   Wt 98.2 kg (216 lb 7.9 oz)   SpO2 99%   BMI 31.06 kg/m²     Physical Exam    Assessment:     1. Primary hypertension    2. Tachycardia, paroxysmal    3. Disequilibrium      Plan:   Primary hypertension  Not at goal.  Increase propranolol  -     Discontinue: propranoloL (INDERAL) 20 MG tablet; Take 1 tablet (20 mg total) by mouth 2 (two) times daily.  Dispense: 180 tablet; Refill: 0  -     propranoloL (INDERAL) 20 MG " tablet; Take 1 tablet (20 mg total) by mouth 2 (two) times daily.  Dispense: 180 tablet; Refill: 1    Tachycardia, paroxysmal  Heart rate within normal range today.  -     Discontinue: propranoloL (INDERAL) 20 MG tablet; Take 1 tablet (20 mg total) by mouth 2 (two) times daily.  Dispense: 180 tablet; Refill: 0  -     propranoloL (INDERAL) 20 MG tablet; Take 1 tablet (20 mg total) by mouth 2 (two) times daily.  Dispense: 180 tablet; Refill: 1    Disequilibrium    Discussed with patient that there are some nonspecific abnormalities on MRI.  Continue following with neuro and ENT.  Complete labs as previously ordered by Neuro    Lab Frequency Next Occurrence   Ambulatory referral/consult to Physical/Occupational Therapy Once 12/06/2021   Ambulatory referral/consult to Psychiatry Once 01/11/2022   Vitamin B1 Once 04/26/2022   Vitamin B2 Once 04/26/2022   NIACIN (VITAMIN B3) Once 04/26/2022   Folate Once 04/26/2022   VITAMIN B5 (PANTOTHENIC ACID) Once 04/26/2022   Vitamin B6 Once 04/26/2022   Vitam B7, H (Biotin) Once 04/26/2022   Vitamin B12 Once 04/26/2022   METHYLMALONIC ACID, SERUM Once 04/26/2022   Homocysteine, serum Once 04/26/2022       Problem List Items Addressed This Visit     Primary hypertension - Primary    Relevant Medications    propranoloL (INDERAL) 20 MG tablet    Tachycardia, paroxysmal    Relevant Medications    propranoloL (INDERAL) 20 MG tablet      Other Visit Diagnoses     Disequilibrium              Follow up in 3 months (on 7/29/2022), or if symptoms worsen or fail to improve.  Needs to complete labs ordered by Dr. Barr today.

## 2022-04-30 LAB
FOLATE SERPL-MCNC: >40 NG/ML (ref 4–24)
HCYS SERPL-SCNC: 7.2 UMOL/L (ref 4–16.5)
VIT B12 SERPL-MCNC: 662 PG/ML (ref 210–950)

## 2022-05-01 ENCOUNTER — PATIENT MESSAGE (OUTPATIENT)
Dept: NEUROLOGY | Facility: CLINIC | Age: 32
End: 2022-05-01
Payer: COMMERCIAL

## 2022-05-02 ENCOUNTER — CLINICAL SUPPORT (OUTPATIENT)
Dept: AUDIOLOGY | Facility: CLINIC | Age: 32
End: 2022-05-02
Payer: COMMERCIAL

## 2022-05-02 ENCOUNTER — PATIENT MESSAGE (OUTPATIENT)
Dept: NEUROLOGY | Facility: CLINIC | Age: 32
End: 2022-05-02
Payer: COMMERCIAL

## 2022-05-02 ENCOUNTER — PATIENT MESSAGE (OUTPATIENT)
Dept: INTERNAL MEDICINE | Facility: CLINIC | Age: 32
End: 2022-05-02
Payer: COMMERCIAL

## 2022-05-02 DIAGNOSIS — H93.13 TINNITUS AURIUM, BILATERAL: Primary | ICD-10-CM

## 2022-05-02 PROCEDURE — 92553 PR AUDIOMETRY, AIR & BONE: ICD-10-PCS | Mod: S$GLB,,,

## 2022-05-02 PROCEDURE — 92553 AUDIOMETRY AIR & BONE: CPT | Mod: S$GLB,,,

## 2022-05-02 NOTE — PROGRESS NOTES
Alphonse Claude Mackey III was seen 05/02/2022 for an audiological evaluation. Patient complains of intermittent tinnitus in both ears. He noted tinnitus in more noticeable at night. Patient reported occupational noise exposure with use of hearing protection. Patient denied difficulties hearing.     Otoscopy revealed clear canals with visualization of tympanic membrane in both ears. Audiometry revealed normal hearing sensitivity in both ears. Speech audiometry was not completed this date due to normal behavioral thresholds.    Patient was counseled on the above findings. Discussed tinnitus management strategies.     Recommendations:  1. Repeat audiological evaluation as needed.

## 2022-05-04 ENCOUNTER — PATIENT OUTREACH (OUTPATIENT)
Dept: ADMINISTRATIVE | Facility: OTHER | Age: 32
End: 2022-05-04
Payer: COMMERCIAL

## 2022-05-04 LAB
METHYLMALONATE SERPL-SCNC: <0.1 UMOL/L
NIACIN SERPL-MCNC: <5 NG/ML
PYRIDOXAL SERPL-MCNC: 94 UG/L (ref 5–50)
VIT B1 BLD-MCNC: 81 UG/L (ref 38–122)

## 2022-05-04 NOTE — PROGRESS NOTES
Health Maintenance Due   Topic Date Due    COVID-19 Vaccine (1) Never done     Updates were requested from care everywhere.  Chart was reviewed for overdue Proactive [facility] Encounters ([facility]) topics (CRS, Breast Cancer Screening, Eye exam)  Health Maintenance has been updated.  LINKS immunization registry triggered.  Immunizations were reconciled.

## 2022-05-05 ENCOUNTER — OFFICE VISIT (OUTPATIENT)
Dept: NEUROLOGY | Facility: CLINIC | Age: 32
End: 2022-05-05
Payer: COMMERCIAL

## 2022-05-05 ENCOUNTER — PATIENT MESSAGE (OUTPATIENT)
Dept: INTERNAL MEDICINE | Facility: CLINIC | Age: 32
End: 2022-05-05
Payer: COMMERCIAL

## 2022-05-05 VITALS
WEIGHT: 211.88 LBS | DIASTOLIC BLOOD PRESSURE: 86 MMHG | OXYGEN SATURATION: 99 % | SYSTOLIC BLOOD PRESSURE: 130 MMHG | HEART RATE: 82 BPM | HEIGHT: 70 IN | RESPIRATION RATE: 16 BRPM | BODY MASS INDEX: 30.33 KG/M2

## 2022-05-05 DIAGNOSIS — F41.0 PANIC DISORDER WITHOUT AGORAPHOBIA WITH MILD PANIC ATTACKS: ICD-10-CM

## 2022-05-05 DIAGNOSIS — M50.021 CERVICAL DISC DISORDER AT C4-C5 LEVEL WITH MYELOPATHY: ICD-10-CM

## 2022-05-05 DIAGNOSIS — H81.8X9 PERSISTENT POSTURAL-PERCEPTUAL DIZZINESS: ICD-10-CM

## 2022-05-05 DIAGNOSIS — R94.31 EKG ABNORMALITIES: ICD-10-CM

## 2022-05-05 DIAGNOSIS — I10 PRIMARY HYPERTENSION: ICD-10-CM

## 2022-05-05 DIAGNOSIS — M54.2 CERVICAL PAIN: ICD-10-CM

## 2022-05-05 DIAGNOSIS — I47.9 TACHYCARDIA, PAROXYSMAL: ICD-10-CM

## 2022-05-05 DIAGNOSIS — R26.89 IMBALANCE: ICD-10-CM

## 2022-05-05 DIAGNOSIS — F40.240 CLAUSTROPHOBIA: ICD-10-CM

## 2022-05-05 DIAGNOSIS — G37.1: Primary | ICD-10-CM

## 2022-05-05 LAB — VIT B2 SERPL-MCNC: 41 MCG/L (ref 1–19)

## 2022-05-05 PROCEDURE — 1159F MED LIST DOCD IN RCRD: CPT | Mod: CPTII,S$GLB,, | Performed by: PSYCHIATRY & NEUROLOGY

## 2022-05-05 PROCEDURE — 3075F PR MOST RECENT SYSTOLIC BLOOD PRESS GE 130-139MM HG: ICD-10-PCS | Mod: CPTII,S$GLB,, | Performed by: PSYCHIATRY & NEUROLOGY

## 2022-05-05 PROCEDURE — 99215 OFFICE O/P EST HI 40 MIN: CPT | Mod: S$GLB,,, | Performed by: PSYCHIATRY & NEUROLOGY

## 2022-05-05 PROCEDURE — 3008F PR BODY MASS INDEX (BMI) DOCUMENTED: ICD-10-PCS | Mod: CPTII,S$GLB,, | Performed by: PSYCHIATRY & NEUROLOGY

## 2022-05-05 PROCEDURE — 3008F BODY MASS INDEX DOCD: CPT | Mod: CPTII,S$GLB,, | Performed by: PSYCHIATRY & NEUROLOGY

## 2022-05-05 PROCEDURE — 3079F DIAST BP 80-89 MM HG: CPT | Mod: CPTII,S$GLB,, | Performed by: PSYCHIATRY & NEUROLOGY

## 2022-05-05 PROCEDURE — 99999 PR PBB SHADOW E&M-EST. PATIENT-LVL IV: ICD-10-PCS | Mod: PBBFAC,,, | Performed by: PSYCHIATRY & NEUROLOGY

## 2022-05-05 PROCEDURE — 99999 PR PBB SHADOW E&M-EST. PATIENT-LVL IV: CPT | Mod: PBBFAC,,, | Performed by: PSYCHIATRY & NEUROLOGY

## 2022-05-05 PROCEDURE — 1159F PR MEDICATION LIST DOCUMENTED IN MEDICAL RECORD: ICD-10-PCS | Mod: CPTII,S$GLB,, | Performed by: PSYCHIATRY & NEUROLOGY

## 2022-05-05 PROCEDURE — 3079F PR MOST RECENT DIASTOLIC BLOOD PRESSURE 80-89 MM HG: ICD-10-PCS | Mod: CPTII,S$GLB,, | Performed by: PSYCHIATRY & NEUROLOGY

## 2022-05-05 PROCEDURE — 3075F SYST BP GE 130 - 139MM HG: CPT | Mod: CPTII,S$GLB,, | Performed by: PSYCHIATRY & NEUROLOGY

## 2022-05-05 PROCEDURE — 99215 PR OFFICE/OUTPT VISIT, EST, LEVL V, 40-54 MIN: ICD-10-PCS | Mod: S$GLB,,, | Performed by: PSYCHIATRY & NEUROLOGY

## 2022-05-05 RX ORDER — DIAZEPAM 5 MG/1
TABLET ORAL
Qty: 1 TABLET | Refills: 0 | Status: SHIPPED | OUTPATIENT
Start: 2022-05-05 | End: 2022-07-29

## 2022-05-05 RX ORDER — ACETAMINOPHEN 500 MG
TABLET ORAL DAILY
COMMUNITY

## 2022-05-05 RX ORDER — AMOXICILLIN 500 MG
CAPSULE ORAL DAILY
COMMUNITY
End: 2023-01-27

## 2022-05-05 RX ORDER — MULTIVIT WITH MINERALS/HERBS
1 TABLET ORAL DAILY
COMMUNITY
End: 2023-01-27

## 2022-05-06 LAB — BIOTIN SERPL-SCNC: >3600 PG/ML (ref 221–3004)

## 2022-05-10 RX ORDER — BUSPIRONE HYDROCHLORIDE 10 MG/1
10 TABLET ORAL 2 TIMES DAILY
Qty: 180 TABLET | Refills: 0 | Status: SHIPPED | OUTPATIENT
Start: 2022-05-10 | End: 2022-07-29 | Stop reason: SDUPTHER

## 2022-05-11 ENCOUNTER — PATIENT MESSAGE (OUTPATIENT)
Dept: INTERNAL MEDICINE | Facility: CLINIC | Age: 32
End: 2022-05-11
Payer: COMMERCIAL

## 2022-05-13 LAB — PANTOTHENATE SERPLBLD-MCNC: NORMAL UG/L

## 2022-05-16 ENCOUNTER — TELEPHONE (OUTPATIENT)
Dept: NEUROLOGY | Facility: CLINIC | Age: 32
End: 2022-05-16
Payer: COMMERCIAL

## 2022-05-16 ENCOUNTER — LAB VISIT (OUTPATIENT)
Dept: LAB | Facility: HOSPITAL | Age: 32
End: 2022-05-16
Attending: INTERNAL MEDICINE
Payer: COMMERCIAL

## 2022-05-16 DIAGNOSIS — Z01.818 PRE-PROCEDURAL EXAMINATION: ICD-10-CM

## 2022-05-16 DIAGNOSIS — Z01.818 PRE-PROCEDURAL EXAMINATION: Primary | ICD-10-CM

## 2022-05-16 PROCEDURE — 82565 ASSAY OF CREATININE: CPT | Performed by: PSYCHIATRY & NEUROLOGY

## 2022-05-16 PROCEDURE — 36415 COLL VENOUS BLD VENIPUNCTURE: CPT | Performed by: PSYCHIATRY & NEUROLOGY

## 2022-05-16 NOTE — TELEPHONE ENCOUNTER
----- Message from RT Maurizio sent at 5/16/2022  3:44 PM CDT -----  Regarding: MRI patient  This patient needs a stat creatine done tomorrow at the [facility] at least one hour prior to his MRI. Thanks

## 2022-05-17 ENCOUNTER — HOSPITAL ENCOUNTER (OUTPATIENT)
Dept: RADIOLOGY | Facility: HOSPITAL | Age: 32
Discharge: HOME | End: 2022-05-17
Attending: PSYCHIATRY & NEUROLOGY
Payer: COMMERCIAL

## 2022-05-17 ENCOUNTER — TELEPHONE (OUTPATIENT)
Dept: NEUROLOGY | Facility: CLINIC | Age: 32
End: 2022-05-17
Payer: COMMERCIAL

## 2022-05-17 DIAGNOSIS — R94.31 EKG ABNORMALITIES: ICD-10-CM

## 2022-05-17 DIAGNOSIS — I10 PRIMARY HYPERTENSION: ICD-10-CM

## 2022-05-17 DIAGNOSIS — I47.9 TACHYCARDIA, PAROXYSMAL: ICD-10-CM

## 2022-05-17 DIAGNOSIS — F41.0 PANIC DISORDER WITHOUT AGORAPHOBIA WITH MILD PANIC ATTACKS: ICD-10-CM

## 2022-05-17 DIAGNOSIS — H81.8X9 PERSISTENT POSTURAL-PERCEPTUAL DIZZINESS: ICD-10-CM

## 2022-05-17 DIAGNOSIS — M54.2 CERVICAL PAIN: ICD-10-CM

## 2022-05-17 DIAGNOSIS — G37.1: ICD-10-CM

## 2022-05-17 DIAGNOSIS — R26.89 IMBALANCE: ICD-10-CM

## 2022-05-17 LAB
CREAT SERPL-MCNC: 1.1 MG/DL (ref 0.5–1.4)
EST. GFR  (AFRICAN AMERICAN): >60 ML/MIN/1.73 M^2
EST. GFR  (NON AFRICAN AMERICAN): >60 ML/MIN/1.73 M^2

## 2022-05-17 PROCEDURE — 72156 MRI CERVICAL SPINE DEMYELINATING W W/O CONTRAST: ICD-10-PCS | Mod: 26,,, | Performed by: RADIOLOGY

## 2022-05-17 PROCEDURE — A9585 GADOBUTROL INJECTION: HCPCS | Mod: PO | Performed by: PSYCHIATRY & NEUROLOGY

## 2022-05-17 PROCEDURE — 72157 MRI CHEST SPINE W/O & W/DYE: CPT | Mod: TC,PO

## 2022-05-17 PROCEDURE — 72156 MRI NECK SPINE W/O & W/DYE: CPT | Mod: TC,PO

## 2022-05-17 PROCEDURE — 72157 MRI CHEST SPINE W/O & W/DYE: CPT | Mod: 26,,, | Performed by: RADIOLOGY

## 2022-05-17 PROCEDURE — 72157 MRI THORACIC SPINE DEMYELINATING W W/O CONTRAST: ICD-10-PCS | Mod: 26,,, | Performed by: RADIOLOGY

## 2022-05-17 PROCEDURE — 72156 MRI NECK SPINE W/O & W/DYE: CPT | Mod: 26,,, | Performed by: RADIOLOGY

## 2022-05-17 PROCEDURE — 25500020 PHARM REV CODE 255: Mod: PO | Performed by: PSYCHIATRY & NEUROLOGY

## 2022-05-17 RX ORDER — GADOBUTROL 604.72 MG/ML
10 INJECTION INTRAVENOUS
Status: COMPLETED | OUTPATIENT
Start: 2022-05-17 | End: 2022-05-17

## 2022-05-17 RX ADMIN — GADOBUTROL 10 ML: 604.72 INJECTION INTRAVENOUS at 01:05

## 2022-05-17 NOTE — TELEPHONE ENCOUNTER
----- Message from Rafael Barr MD sent at 5/17/2022  3:22 PM CDT -----    05-    T-Spine MRI WWO NL    C-Spine WWO Severe IVDP C4-C5 with myelopathic changes at C4-C5     I placed a referral to neurosurgery ASAP.    I can talk to him and show him the films.

## 2022-05-18 ENCOUNTER — PATIENT MESSAGE (OUTPATIENT)
Dept: NEUROLOGY | Facility: CLINIC | Age: 32
End: 2022-05-18
Payer: COMMERCIAL

## 2022-05-24 ENCOUNTER — OFFICE VISIT (OUTPATIENT)
Dept: NEUROSURGERY | Facility: CLINIC | Age: 32
End: 2022-05-24
Payer: COMMERCIAL

## 2022-05-24 ENCOUNTER — PATIENT MESSAGE (OUTPATIENT)
Dept: NEUROSURGERY | Facility: CLINIC | Age: 32
End: 2022-05-24

## 2022-05-24 VITALS
WEIGHT: 211.63 LBS | RESPIRATION RATE: 18 BRPM | DIASTOLIC BLOOD PRESSURE: 80 MMHG | HEIGHT: 70 IN | SYSTOLIC BLOOD PRESSURE: 131 MMHG | BODY MASS INDEX: 30.3 KG/M2 | HEART RATE: 72 BPM

## 2022-05-24 DIAGNOSIS — M50.021 CERVICAL DISC DISORDER AT C4-C5 LEVEL WITH MYELOPATHY: ICD-10-CM

## 2022-05-24 DIAGNOSIS — M50.30 DDD (DEGENERATIVE DISC DISEASE), CERVICAL: Primary | ICD-10-CM

## 2022-05-24 PROCEDURE — 1159F MED LIST DOCD IN RCRD: CPT | Mod: CPTII,S$GLB,, | Performed by: PHYSICIAN ASSISTANT

## 2022-05-24 PROCEDURE — 99203 PR OFFICE/OUTPT VISIT, NEW, LEVL III, 30-44 MIN: ICD-10-PCS | Mod: S$GLB,,, | Performed by: PHYSICIAN ASSISTANT

## 2022-05-24 PROCEDURE — 1159F PR MEDICATION LIST DOCUMENTED IN MEDICAL RECORD: ICD-10-PCS | Mod: CPTII,S$GLB,, | Performed by: PHYSICIAN ASSISTANT

## 2022-05-24 PROCEDURE — 1160F PR REVIEW ALL MEDS BY PRESCRIBER/CLIN PHARMACIST DOCUMENTED: ICD-10-PCS | Mod: CPTII,S$GLB,, | Performed by: PHYSICIAN ASSISTANT

## 2022-05-24 PROCEDURE — 99203 OFFICE O/P NEW LOW 30 MIN: CPT | Mod: S$GLB,,, | Performed by: PHYSICIAN ASSISTANT

## 2022-05-24 PROCEDURE — 3075F PR MOST RECENT SYSTOLIC BLOOD PRESS GE 130-139MM HG: ICD-10-PCS | Mod: CPTII,S$GLB,, | Performed by: PHYSICIAN ASSISTANT

## 2022-05-24 PROCEDURE — 1160F RVW MEDS BY RX/DR IN RCRD: CPT | Mod: CPTII,S$GLB,, | Performed by: PHYSICIAN ASSISTANT

## 2022-05-24 PROCEDURE — 3079F DIAST BP 80-89 MM HG: CPT | Mod: CPTII,S$GLB,, | Performed by: PHYSICIAN ASSISTANT

## 2022-05-24 PROCEDURE — 99999 PR PBB SHADOW E&M-EST. PATIENT-LVL V: CPT | Mod: PBBFAC,,, | Performed by: PHYSICIAN ASSISTANT

## 2022-05-24 PROCEDURE — 3079F PR MOST RECENT DIASTOLIC BLOOD PRESSURE 80-89 MM HG: ICD-10-PCS | Mod: CPTII,S$GLB,, | Performed by: PHYSICIAN ASSISTANT

## 2022-05-24 PROCEDURE — 3008F BODY MASS INDEX DOCD: CPT | Mod: CPTII,S$GLB,, | Performed by: PHYSICIAN ASSISTANT

## 2022-05-24 PROCEDURE — 3008F PR BODY MASS INDEX (BMI) DOCUMENTED: ICD-10-PCS | Mod: CPTII,S$GLB,, | Performed by: PHYSICIAN ASSISTANT

## 2022-05-24 PROCEDURE — 99999 PR PBB SHADOW E&M-EST. PATIENT-LVL V: ICD-10-PCS | Mod: PBBFAC,,, | Performed by: PHYSICIAN ASSISTANT

## 2022-05-24 PROCEDURE — 3075F SYST BP GE 130 - 139MM HG: CPT | Mod: CPTII,S$GLB,, | Performed by: PHYSICIAN ASSISTANT

## 2022-05-30 ENCOUNTER — PATIENT MESSAGE (OUTPATIENT)
Dept: INTERNAL MEDICINE | Facility: CLINIC | Age: 32
End: 2022-05-30
Payer: COMMERCIAL

## 2022-06-01 ENCOUNTER — HOSPITAL ENCOUNTER (OUTPATIENT)
Dept: RADIOLOGY | Facility: HOSPITAL | Age: 32
Discharge: HOME | End: 2022-06-01
Attending: PHYSICIAN ASSISTANT
Payer: COMMERCIAL

## 2022-06-01 DIAGNOSIS — M50.021 CERVICAL DISC DISORDER AT C4-C5 LEVEL WITH MYELOPATHY: ICD-10-CM

## 2022-06-01 DIAGNOSIS — M50.30 DDD (DEGENERATIVE DISC DISEASE), CERVICAL: ICD-10-CM

## 2022-06-01 PROCEDURE — 72125 CT CERVICAL SPINE WITHOUT CONTRAST: ICD-10-PCS | Mod: 26,,, | Performed by: RADIOLOGY

## 2022-06-01 PROCEDURE — 72125 CT NECK SPINE W/O DYE: CPT | Mod: TC

## 2022-06-01 PROCEDURE — 72125 CT NECK SPINE W/O DYE: CPT | Mod: 26,,, | Performed by: RADIOLOGY

## 2022-06-06 ENCOUNTER — PATIENT MESSAGE (OUTPATIENT)
Dept: NEUROSURGERY | Facility: CLINIC | Age: 32
End: 2022-06-06
Payer: COMMERCIAL

## 2022-06-07 ENCOUNTER — HOSPITAL ENCOUNTER (OUTPATIENT)
Dept: RADIOLOGY | Facility: HOSPITAL | Age: 32
Discharge: HOME | End: 2022-06-07
Attending: NEUROLOGICAL SURGERY
Payer: COMMERCIAL

## 2022-06-07 ENCOUNTER — OFFICE VISIT (OUTPATIENT)
Dept: NEUROSURGERY | Facility: CLINIC | Age: 32
End: 2022-06-07
Payer: COMMERCIAL

## 2022-06-07 ENCOUNTER — HOSPITAL ENCOUNTER (OUTPATIENT)
Dept: CARDIOLOGY | Facility: HOSPITAL | Age: 32
Discharge: HOME | End: 2022-06-07
Attending: NEUROLOGICAL SURGERY
Payer: COMMERCIAL

## 2022-06-07 VITALS
HEIGHT: 70 IN | WEIGHT: 211 LBS | HEART RATE: 88 BPM | SYSTOLIC BLOOD PRESSURE: 162 MMHG | BODY MASS INDEX: 30.21 KG/M2 | RESPIRATION RATE: 18 BRPM | DIASTOLIC BLOOD PRESSURE: 97 MMHG

## 2022-06-07 DIAGNOSIS — Z01.818 PRE-OPERATIVE EXAMINATION: ICD-10-CM

## 2022-06-07 DIAGNOSIS — M54.12 CERVICAL RADICULOPATHY: ICD-10-CM

## 2022-06-07 DIAGNOSIS — Z01.818 PRE-OPERATIVE EXAMINATION: Primary | ICD-10-CM

## 2022-06-07 DIAGNOSIS — M50.30 DEGENERATIVE DISC DISEASE, CERVICAL: ICD-10-CM

## 2022-06-07 DIAGNOSIS — G99.2 MYELOPATHY CONCURRENT WITH AND DUE TO SPINAL STENOSIS OF CERVICAL REGION: ICD-10-CM

## 2022-06-07 DIAGNOSIS — M48.02 MYELOPATHY CONCURRENT WITH AND DUE TO SPINAL STENOSIS OF CERVICAL REGION: ICD-10-CM

## 2022-06-07 PROCEDURE — 93010 EKG 12-LEAD: ICD-10-PCS | Mod: ,,, | Performed by: INTERNAL MEDICINE

## 2022-06-07 PROCEDURE — 93010 ELECTROCARDIOGRAM REPORT: CPT | Mod: ,,, | Performed by: INTERNAL MEDICINE

## 2022-06-07 PROCEDURE — 1159F PR MEDICATION LIST DOCUMENTED IN MEDICAL RECORD: ICD-10-PCS | Mod: CPTII,S$GLB,, | Performed by: NEUROLOGICAL SURGERY

## 2022-06-07 PROCEDURE — 93005 ELECTROCARDIOGRAM TRACING: CPT

## 2022-06-07 PROCEDURE — 99214 OFFICE O/P EST MOD 30 MIN: CPT | Mod: S$GLB,,, | Performed by: NEUROLOGICAL SURGERY

## 2022-06-07 PROCEDURE — 99214 PR OFFICE/OUTPT VISIT, EST, LEVL IV, 30-39 MIN: ICD-10-PCS | Mod: S$GLB,,, | Performed by: NEUROLOGICAL SURGERY

## 2022-06-07 PROCEDURE — 3077F PR MOST RECENT SYSTOLIC BLOOD PRESSURE >= 140 MM HG: ICD-10-PCS | Mod: CPTII,S$GLB,, | Performed by: NEUROLOGICAL SURGERY

## 2022-06-07 PROCEDURE — 1159F MED LIST DOCD IN RCRD: CPT | Mod: CPTII,S$GLB,, | Performed by: NEUROLOGICAL SURGERY

## 2022-06-07 PROCEDURE — 71045 X-RAY EXAM CHEST 1 VIEW: CPT | Mod: TC

## 2022-06-07 PROCEDURE — 3008F PR BODY MASS INDEX (BMI) DOCUMENTED: ICD-10-PCS | Mod: CPTII,S$GLB,, | Performed by: NEUROLOGICAL SURGERY

## 2022-06-07 PROCEDURE — 99999 PR PBB SHADOW E&M-EST. PATIENT-LVL IV: CPT | Mod: PBBFAC,,, | Performed by: NEUROLOGICAL SURGERY

## 2022-06-07 PROCEDURE — 71045 X-RAY EXAM CHEST 1 VIEW: CPT | Mod: 26,,, | Performed by: RADIOLOGY

## 2022-06-07 PROCEDURE — 71045 XR CHEST 1 VIEW PRE-OP: ICD-10-PCS | Mod: 26,,, | Performed by: RADIOLOGY

## 2022-06-07 PROCEDURE — 99999 PR PBB SHADOW E&M-EST. PATIENT-LVL IV: ICD-10-PCS | Mod: PBBFAC,,, | Performed by: NEUROLOGICAL SURGERY

## 2022-06-07 PROCEDURE — 3008F BODY MASS INDEX DOCD: CPT | Mod: CPTII,S$GLB,, | Performed by: NEUROLOGICAL SURGERY

## 2022-06-07 PROCEDURE — 3080F PR MOST RECENT DIASTOLIC BLOOD PRESSURE >= 90 MM HG: ICD-10-PCS | Mod: CPTII,S$GLB,, | Performed by: NEUROLOGICAL SURGERY

## 2022-06-07 PROCEDURE — 3077F SYST BP >= 140 MM HG: CPT | Mod: CPTII,S$GLB,, | Performed by: NEUROLOGICAL SURGERY

## 2022-06-07 PROCEDURE — 3080F DIAST BP >= 90 MM HG: CPT | Mod: CPTII,S$GLB,, | Performed by: NEUROLOGICAL SURGERY

## 2022-06-07 NOTE — PROGRESS NOTES
Subjective:      Patient ID: Alphonse Claude Mackey III is a 32 y.o. male.    HPI:  Follow-up (Pt is here today to discuss ct results with Dr Fortune, pt is doing well 0/10 pain. )      Today for follow-up with a CT scan of the cervical spine  Previously seen for cervical myelopathy and found have severe degenerative disc disease with posterior disc herniation and evidence of cord signal change at C3-4 worse at C4-5.    Initially he was seen by Neurology and worked up having dizziness and balance difficulty    Pain is 0/10   initally was travelling through the arm into the fingers   Ambulates unassisted denies any b/b issues                  PREVIOUS NOTES   The patient is here today for evaluation of cervical disc disorder. He is referred to us by Dr. Barr.  States his symptoms started as dizziness and HTN, approximately 5 months ago.  After his annual, they did more testing including EKG (abnormal heart beat), Echo and stress test.   He eventually saw an ENT here for the dizziness and then Neurology.  Patient had MRI of his brain, thoracic and cervical spine.     Patient states a few months ago he was in therapy for what seemed a pinched nerve in the L arm.  At the time, it was localized to posterior neck, L side of neck and deltoid.  He also experienced tingling radiating down entire L arm to his fingers.  He has random pains in the R shoulder.     Currently he has no pain or numbness.  Still c/o dizziness.  Denies HA.  No acute bladder/bowel changes.  Patient denies falls, syncopal episodes, chronic h/o neck pain.  No previous neck surgery or injections.    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   If he is moving around or working out the dizziness improves.    The patient works out daily. He reported that he stopped heavy weights (above 70 lbs).   He has been doing 30 min jump rope, lifts and presses (usually up to 70 lbs).    In the past, he has tried Physical therapy, massage and  chiro.          Objective:     Body mass index is 30.28 kg/m².  Vitals:    06/07/22 1144   BP: (!) 162/97   Pulse: 88   Resp: 18            Neck:  None  Paraspinal tenderness   None  Paraspinal muscle spasms   None  Pain with flexion and extention   WNL  Range of motion shoulders   Neg Not tested Spurling's sign     Motor:   Right Right Left Left  Level Group   5  5 4+ C5 Deltoid   5  5 4+ C6 Bicep   5  5 4+  Wrist extension    5  5  C7 Triceps   5  5   Wrist flexion   5  5  C8    5  5  T1 Interossei      Sensation:  NL Decreased (R/L/BL) Level Sensation    [x]   C5 Lateral upper arm   [x]   C6 Thumb and index finger, lat forearm   [x]   C7 Middle finger   [x]   C8 Ring and little finger   [x]   T1 Medial arm      Reflex:  2+  Bicep tendon   2+  Brachioradialis   2+  Triceps tendon    Bilateral  Hinojosa's    +  BL Clonus   neg  Tinel's         Lab Results   Component Value Date    WBC 4.64 01/04/2022    HCT 48.3 01/04/2022     MRI Cervical:  FINDINGS:  There is straightening of the upper cervical spine.  No fracture. No marrow signal abnormality suspicious for an infiltrative process.  There is disc desiccation noted at the C3-4 through the C5-6 levels.  Mild disc space narrowing seen at C5-6.   There is equivocal slight increased signal seen within the cord at the C4-5 level secondary to myelopathy.  The craniocervical junction and visualized intracranial contents are unremarkable.  The adjacent soft tissue structures show no significant abnormalities.     C2-C3:  No significant central canal or neural foraminal narrowing.     C3-C4:  Central disc protrusion resulting in effacement of the thecal sac and at least mild effacement of the cord.  The AP dimension of the central canal at this level measures approximately 7 mm.  No neural foraminal canal narrowing.     C4-C5:  Prominent central disc protrusion resulting in effacement of the thecal sac and moderate to severe effacement the cord with the AP dimension of  the central canal at this level measuring 5 mm.  The neural foraminal canals are patent.  There is equivocal slight increase in cord signal, which would be most consistent with myelopathy.     C5-C6:  Very minimal diffuse disc bulge resulting in very minimal effacement of ventral thecal sac.  No abutment of the cord.  No neural foraminal canal narrowing.   C6-C7:  No significant central canal or neural foraminal narrowing.     C7-T1:   No significant central canal or neural foraminal narrowing.     Impression:   1. Prominent disc protrusions at the C3-4 and C4-5 level with possible mild myelopathic changes within the cervical cord at the C4-5 level secondary to discogenic changes.     INDEPENDENT INTERPRETATION OF TEST:  See above.    Assessment:     1. Myelopathy concurrent with and due to spinal stenosis of cervical region    2. Degenerative disc disease, cervical    3. Cervical radiculopathy      Plan:     Myelopathy concurrent with and due to spinal stenosis of cervical region    Degenerative disc disease, cervical    Cervical radiculopathy       Patient with signs and symptoms of cervical myelopathy.  Disc degeneration and central narrowing of the central canal at C 3 4 as well as C4-5.  Behind the disc at the C4-5 level there is mild ossification of the posterior longitudinal ligament causing indentation and compression of the thecal sac    After discussing treatment options including conservative therapies and medications did agree elect to undergo  Anterior cervical decompression with fusion possible corpectomy C3-C5.  Purpose of this procedure would be to decompress the thecal sac as well as remove the posterior osteophytes.    The patient was informed of all benefits and potential risk of the operation including but not limited to:  Pain, infection, bleeding, coma, paralysis, death.  Cerebrospinal fluid leak, failure of any instrumentation, the need for additional procedures in the future. No guarantee was  given that this procedure would alleviate all of the symptoms.    Will refer to ENT for assistance with anterior exposure.  Consents signed in the office today  Will call with surgical date once patient is seen with ENT    Thank you for the referral   Please call with any questions    Yash Fortune MD  Neurosurgery     Disclaimer: This note was prepared using a voice recognition system and is likely to have sound alike errors within the text.

## 2022-06-09 ENCOUNTER — PATIENT MESSAGE (OUTPATIENT)
Dept: NEUROSURGERY | Facility: CLINIC | Age: 32
End: 2022-06-09
Payer: COMMERCIAL

## 2022-06-18 NOTE — PROGRESS NOTES
"Subjective:       Patient ID: Alphonse Claude Mackey III is a 31 y.o. male.    Chief Complaint: MRI results           HPI       BACKGROUND HISTORY       The patient was referred by Dr. Sandoval for "dizziness".     The patient reports longstanding history of feeling off balance that became prominent in the last 3 months (). Denies spinning or lightheadedness. Describes his symptom as feeling off balance while walking especially when he moves fast (not his head). The feeling comes multiple times a day, not with every time he walks. Sitting and stopping alleviates the feeling in addition to talking and being mentally engaged. No associated neurological symptoms. No weakness, numbness, loss of hearing or vision, tremors, trouble swallowing or double vision. Endorses occasional mild headaches that respond to Tylenol.  No trauma. Has significant history of ROZINA. ENT evaluation and Cardiology evaluation were normal. On 01- CBC, CMP, TFT NL. On 10- EKG, TTE and Holter NL. On 03- Audiology evaluation was NL. Ordered  Brain MRI WWO R/O CVD and Demyelination and CTA H/N R/O VBI and IC/EC dissection or malformations.       INTERVAL HISTORY       On 04- CTA H/N NL , Brain MRI CC Splenial T2 signal. Denies alcohol intake. Ordered B Complex . On 04- B Complex with HC, MMA Unremarkable. Continues to have perceptual postural dizziness that is alleviated by being busy and engaging.          Review of Systems   Constitutional: Negative for appetite change and fatigue.   HENT: Negative for hearing loss and tinnitus.    Eyes: Negative for photophobia and visual disturbance.   Respiratory: Negative for apnea and shortness of breath.    Cardiovascular: Negative for chest pain and palpitations.   Gastrointestinal: Negative for nausea and vomiting.   Endocrine: Negative for cold intolerance and heat intolerance.   Genitourinary: Negative for difficulty urinating and urgency.   Musculoskeletal: " Negative for arthralgias, back pain, gait problem, joint swelling, myalgias, neck pain and neck stiffness.   Skin: Negative for color change and rash.   Allergic/Immunologic: Negative for environmental allergies and immunocompromised state.   Neurological: Positive for light-headedness. Negative for dizziness, tremors, seizures, syncope, facial asymmetry, speech difficulty, weakness, numbness and headaches.   Hematological: Negative for adenopathy. Does not bruise/bleed easily.   Psychiatric/Behavioral: Negative for agitation, behavioral problems, confusion, decreased concentration, dysphoric mood, hallucinations, self-injury, sleep disturbance and suicidal ideas. The patient is nervous/anxious. The patient is not hyperactive.                  Current Outpatient Medications:     b complex vitamins tablet, Take 1 tablet by mouth once daily., Disp: , Rfl:     cholecalciferol, vitamin D3, (D3-2000) 50 mcg (2,000 unit) Cap capsule, Take by mouth once daily., Disp: , Rfl:     multivit,calc,min/FA/K1/lycop (ONE-A-DAY MEN'S COMPLETE ORAL), Take by mouth., Disp: , Rfl:     multivitamin capsule, Take 1 capsule by mouth once daily., Disp: , Rfl:     omega-3 fatty acids/fish oil (FISH OIL-OMEGA-3 FATTY ACIDS) 300-1,000 mg capsule, Take by mouth once daily., Disp: , Rfl:     propranoloL (INDERAL) 20 MG tablet, Take 1 tablet (20 mg total) by mouth 2 (two) times daily., Disp: 180 tablet, Rfl: 1    diazePAM (VALIUM) 5 MG tablet, 1 tab 30 minutes before MRI, Disp: 1 tablet, Rfl: 0  Past Medical History:   Diagnosis Date    Anxiety     Primary hypertension 1/6/2022     Past Surgical History:   Procedure Laterality Date    ADENOIDECTOMY      TONSILLECTOMY       Social History     Socioeconomic History    Marital status: Single   Tobacco Use    Smoking status: Never Smoker    Smokeless tobacco: Never Used   Substance and Sexual Activity    Alcohol use: Never    Drug use: Never    Sexual activity: Yes     Partners:  Female   Social History Narrative    No smokers or pets in household.             Past/Current Medical/Surgical History, Past/Current Social History, Past/Current Family History and Past/Current Medications were reviewed in detail.        Objective:           VITAL SIGNS WERE REVIEWED      GENERAL APPEARANCE:     The patient looks comfortable.    BMI 30.40    No signs of respiratory distress.    Normal breathing pattern.    No dysmorphic features    Normal eye contact.     GENERAL MEDICAL EXAM:    HEENT:  Head is atraumatic normocephalic. Fundoscopic (Ophthalmoscopic) exam showed no disc edema.      Neck and Axillae: No JVD. No visible lesions.    Cardiopulmonary: No cyanosis. No tachypnea. Normal respiratory effort.    Gastrointestinal/Urogenital:  No jaundice. No stomas or lesions. No visible hernias. No catheters.     Skin, Hair and Nails: No pathognonomic skin rash. No neurofibromatosis. No visible lesions.No stigmata of autoimmune disease. No clubbing.    Limbs: No varicose veins. No visible swelling.    Muskoskeletal: No visible deformities.No visible lesions.           Neurologic Exam     Mental Status   Oriented to person, place, and time.   Follows 3 step commands.   Attention: normal. Concentration: normal.   Speech: speech is normal   Level of consciousness: alert  Knowledge: good.   Able to name object. Able to repeat. Normal comprehension.     Cranial Nerves   Cranial nerves II through XII intact.     CN II   Visual fields full to confrontation.   Visual acuity: normal  Right visual field deficit: none  Left visual field deficit: none     CN III, IV, VI   Pupils are equal, round, and reactive to light.  Extraocular motions are normal.   Right pupil: Size: 2 mm. Shape: regular. Reactivity: brisk. Consensual response: intact. Accommodation: intact.   Left pupil: Size: 2 mm. Shape: regular. Reactivity: brisk. Consensual response: intact. Accommodation: intact.   CN III: no CN III palsy  CN VI: no CN VI  palsy  Nystagmus: none   Diplopia: none  Ophthalmoparesis: none  Upgaze: normal  Downgaze: normal  Conjugate gaze: present  Vestibulo-ocular reflex: present    CN V   Facial sensation intact.   Right facial sensation deficit: none  Left facial sensation deficit: none    CN VII   Facial expression full, symmetric.   Right facial weakness: none  Left facial weakness: none    CN VIII   CN VIII normal.   Hearing: intact    CN IX, X   CN IX normal.   CN X normal.   Palate: symmetric    CN XI   CN XI normal.   Right sternocleidomastoid strength: normal  Left sternocleidomastoid strength: normal  Right trapezius strength: normal  Left trapezius strength: normal    CN XII   CN XII normal.   Tongue: not atrophic  Fasciculations: absent  Tongue deviation: none    Motor Exam   Muscle bulk: normal  Overall muscle tone: normal  Right arm tone: normal  Left arm tone: normal  Right arm pronator drift: absent  Left arm pronator drift: absent  Right leg tone: normal  Left leg tone: normal    Strength   Strength 5/5 throughout.   Right neck flexion: 5/5  Left neck flexion: 5/5  Right neck extension: 5/5  Left neck extension: 5/5  Right deltoid: 5/5  Left deltoid: 5/5  Right biceps: 5/5  Left biceps: 5/5  Right triceps: 5/5  Left triceps: 5/5  Right wrist flexion: 5/5  Left wrist flexion: 5/5  Right wrist extension: 5/5  Left wrist extension: 5/5  Right interossei: 5/5  Left interossei: 5/5  Right iliopsoas: 5/5  Left iliopsoas: 5/5  Right quadriceps: 5/5  Left quadriceps: 5/5  Right hamstrin/5  Left hamstrin/5  Right glutei: 5/5  Left glutei: 5/5  Right anterior tibial: 5/5  Left anterior tibial: 5/5  Right posterior tibial: 5/5  Left posterior tibial: 5/5  Right peroneal: 5/5  Left peroneal: 5/5  Right gastroc: 5/5  Left gastroc: 5/5    Sensory Exam   Light touch normal.   Right arm light touch: normal  Left arm light touch: normal  Right leg light touch: normal  Left leg light touch: normal  Vibration normal.   Right arm  vibration: normal  Left arm vibration: normal  Right leg vibration: normal  Left leg vibration: normal  Proprioception normal.   Right arm proprioception: normal  Left arm proprioception: normal  Right leg proprioception: normal  Left leg proprioception: normal  Pinprick normal.   Right arm pinprick: normal  Left arm pinprick: normal  Right leg pinprick: normal  Left leg pinprick: normal  Graphesthesia: normal  Stereognosis: normal    Gait, Coordination, and Reflexes     Gait  Gait: normal    Coordination   Romberg: negative  Finger to nose coordination: normal  Heel to shin coordination: normal  Tandem walking coordination: normal    Tremor   Resting tremor: absent  Intention tremor: absent  Action tremor: absent    Reflexes   Right brachioradialis: 2+  Left brachioradialis: 2+  Right biceps: 2+  Left biceps: 2+  Right triceps: 2+  Left triceps: 2+  Right patellar: 2+  Left patellar: 2+  Right achilles: 2+  Left achilles: 2+  Right plantar: normal  Left plantar: normal  Right Hinojosa: absent  Left Hinojosa: absent  Right ankle clonus: absent  Left ankle clonus: absent  Right pendular knee jerk: absent  Left pendular knee jerk: absent      Lab Results   Component Value Date    WBC 4.64 01/04/2022    HGB 16.4 01/04/2022    HCT 48.3 01/04/2022    MCV 87 01/04/2022     01/04/2022     Sodium   Date Value Ref Range Status   01/04/2022 138 136 - 145 mmol/L Final     Potassium   Date Value Ref Range Status   01/04/2022 4.4 3.5 - 5.1 mmol/L Final     Chloride   Date Value Ref Range Status   01/04/2022 100 95 - 110 mmol/L Final     CO2   Date Value Ref Range Status   01/04/2022 29 23 - 29 mmol/L Final     Glucose   Date Value Ref Range Status   01/04/2022 97 70 - 110 mg/dL Final     BUN   Date Value Ref Range Status   01/04/2022 13 6 - 20 mg/dL Final     Creatinine   Date Value Ref Range Status   01/04/2022 0.9 0.5 - 1.4 mg/dL Final     Calcium   Date Value Ref Range Status   01/04/2022 10.0 8.7 - 10.5 mg/dL Final      Total Protein   Date Value Ref Range Status   01/04/2022 8.2 6.0 - 8.4 g/dL Final     Albumin   Date Value Ref Range Status   01/04/2022 4.6 3.5 - 5.2 g/dL Final     Total Bilirubin   Date Value Ref Range Status   01/04/2022 0.7 0.1 - 1.0 mg/dL Final     Comment:     For infants and newborns, interpretation of results should be based  on gestational age, weight and in agreement with clinical  observations.    Premature Infant recommended reference ranges:  Up to 24 hours.............<8.0 mg/dL  Up to 48 hours............<12.0 mg/dL  3-5 days..................<15.0 mg/dL  6-29 days.................<15.0 mg/dL       Alkaline Phosphatase   Date Value Ref Range Status   01/04/2022 49 (L) 55 - 135 U/L Final     AST   Date Value Ref Range Status   01/04/2022 27 10 - 40 U/L Final     ALT   Date Value Ref Range Status   01/04/2022 33 10 - 44 U/L Final     Anion Gap   Date Value Ref Range Status   01/04/2022 9 8 - 16 mmol/L Final     eGFR if    Date Value Ref Range Status   01/04/2022 >60.0 >60 mL/min/1.73 m^2 Final     eGFR if non    Date Value Ref Range Status   01/04/2022 >60.0 >60 mL/min/1.73 m^2 Final     Comment:     Calculation used to obtain the estimated glomerular filtration  rate (eGFR) is the CKD-EPI equation.        Lab Results   Component Value Date    IRNSONNA56 662 04/29/2022     Lab Results   Component Value Date    TSH 1.415 01/04/2022 01-    CBC, CMP, TFT NL    0-    EKG, TTE and Holter NL     03-    Audiology evaluation was NL.      04-    CTA H/N NL       04-    Brain MRI CC Splenial T2 signal (Incidental)             04-      B Complex with HC, MMA Unremarkable   B1 (Thiamine)   B2 (Riboflavin)  B3 (Niacin)  B4-Not essential  B5 (Pantothenic Acid)  B6 (Pyridoxine)  B7 (Biotin)  B8-Not essential  B9 (Folate)  B10-Not essential  B11-Not essential   B12 (Cyanocobalamin)        05-    T-Spine MRI [facility]    C-Spine O  Severe IVDP C4-C5 with myelopathic changes at C4-C5           Reviewed the neuroimaging independently       Assessment:       1. Demyelination, corpus callosum central    2. Panic disorder without agoraphobia with mild panic attacks    3. Cervical pain    4. Primary hypertension    5. Tachycardia, paroxysmal    6. Imbalance    7. EKG abnormalities    8. Persistent postural-perceptual dizziness    9. Claustrophobia        Plan:             PERCEPTUAL PAROXYSMAL IMBALANCE, PERSISTENT POSTURAL-PERCEPTUAL DIZZINESS (PPPD)    Recommended ROZINA management.      INCIDENTAL SPLENIUM T2 SIGNAL CHANGE       C/T Spine MRIs WWO.    Repeat Brain  MRI WWO in 6 months.                   MEDICAL/SURGICAL COMORBIDITIES     All relevant medical comorbidities noted and managed by primary care physician and medical care team.          HEALTHY LIFESTYLE AND PREVENTATIVE CARE    The patient to adhere to the age-appropriate health maintenance guidelines including screening tests and vaccinations. The patient to adhere to  healthy lifestyle, optimal weight, exercise, healthy diet, good sleep hygiene and avoiding drugs including smoking, alcohol and recreational drugs.          RTC in 6 months     Rafael Barr MD, FAAN    Attending Neurologist/Epileptologist         Diplomate, American Board of Psychiatry and Neurology    Diplomate, American Board of Clinical Neurophysiology     Fellow, American Academy of Neurology             I spent a total of 55 minutes on the day of the visit.  This includes face to face time and non-face to face time preparing to see the patient (eg, review of tests), obtaining and/or reviewing separately obtained history, documenting clinical information in the electronic or other health record, independently interpreting results and communicating results to the patient/family/caregiver, or care coordinator.             Patient c/o new onset sharp left sided chest pain that woke her up this morning lasting a few seconds. Patient also c/o left neck pain radiating to left shoulder and down to left hand x few days.

## 2022-06-27 DIAGNOSIS — G99.2 MYELOPATHY CONCURRENT WITH AND DUE TO SPINAL STENOSIS OF CERVICAL REGION: ICD-10-CM

## 2022-06-27 DIAGNOSIS — M50.30 DEGENERATIVE DISC DISEASE, CERVICAL: Primary | ICD-10-CM

## 2022-06-27 DIAGNOSIS — M54.12 CERVICAL RADICULOPATHY: ICD-10-CM

## 2022-06-27 DIAGNOSIS — M48.02 MYELOPATHY CONCURRENT WITH AND DUE TO SPINAL STENOSIS OF CERVICAL REGION: ICD-10-CM

## 2022-07-06 NOTE — PRE ADMISSION SCREENING
Pre op instructions reviewed with patient per phone:    To confirm, Your surgeon has instructed you:  Surgery is scheduled 7/13/22at 8:30am.      Please report to [facility] main lobby by 7:00am.   Pre admit office to call afternoon prior to surgery with final arrival time    Covid 19 testing is scheduled for 7/11.     Do not eat, drink, or smoke after 12 midnight prior to surgery, including water. OK to brush teeth, no gum, candy or mints!    ¨ Take only these medicines with a small swallow of water-morning of surgery.  Buspar  Propranolol    -------   Do not shave morning of surgery  ____   2  visitor is  allowed in the pre operative area, no one under the age of 12, and must adhere to social distancing guidelines.  2 visitor/family member is allowed to              visit non covid  in-patients in their room    ____   Family/caregivers will be updated re pt status via text/cell phone    ____  Do not wear makeup, including mascara.  ____  No powder, lotions or creams to surgical area.  ____  Please remove all jewelry, including piercings and leave at home.  ____  No money or valuables needed. Please leave at home.  ____  Please bring identification and insurance information to hospital.  ____  If going home the same day, arrange for a ride home. You will not be able to   drive if Anesthesia was used.  ____  Children, under 12 years old, must remain in the waiting room with an adult.  They are not allowed in patient areas.  ____  Wear loose fitting clothing. Allow for dressings, bandages.  ____  Stop Aspirin, Ibuprofen, Motrin and Aleve at least 5-7 days before surgery, unless otherwise instructed by your doctor, or the nurse.   You MAY use Tylenol/acetaminophen until day of surgery.  ____  If you take diabetic medication, do not take am of surgery unless instructed by   Doctor.  ____ Stop taking any Fish Oil supplement or any Vitamins that contain Vitamin E at least 5 days prior  to surgery.          Bathing Instructions-- The night before surgery and the morning prior to coming to the hospital:   -Do not shave the surgical area.   -Shower and wash your hair and body as usual with anti-bacterial  soap and shampoo.   -Rinse your hair and body completely.   -Use one packet of hibiclens to wash the surgical site (using your hand) gently for 5 minutes.  Do not scrub you skin too hard.   -Do not use hibiclens on your head, face, or genitals.   -Do not wash with anti-bacterial soap after you use the hibiclens.   -Rinse your body thoroughly.   -Dry with clean, soft towel.  Do not use lotion, cream, deodorant, or powders on   the surgical site.    Use antibacterial soap in place of hibiclens if your surgery is on the head, face or genitals.         Surgical Site Infection    Prevention of surgical site infections:     -Keep incisions clean and dry.   -Do not soak/submerge incisions in water until completely healed.   -Do not apply lotions, powders, creams, or deodorants to site.   -Always make sure hands are cleaned with antibacterial soap/ alcohol-based   prior to touching the surgical site.  (This includes doctors, nurses, staff, and yourself.)    Signs and symptoms:   -Redness and pain around the area where you had surgery   -Drainage of cloudy fluid from your surgical wound   -Fever over 100.4

## 2022-07-11 ENCOUNTER — LAB VISIT (OUTPATIENT)
Dept: PRIMARY CARE CLINIC | Facility: CLINIC | Age: 32
End: 2022-07-11
Payer: COMMERCIAL

## 2022-07-11 DIAGNOSIS — Z01.818 PRE-OPERATIVE EXAMINATION: ICD-10-CM

## 2022-07-11 LAB
SARS-COV-2 RNA RESP QL NAA+PROBE: NOT DETECTED
SARS-COV-2- CYCLE NUMBER: NORMAL

## 2022-07-11 PROCEDURE — U0005 INFEC AGEN DETEC AMPLI PROBE: HCPCS | Performed by: NEUROLOGICAL SURGERY

## 2022-07-11 PROCEDURE — U0003 INFECTIOUS AGENT DETECTION BY NUCLEIC ACID (DNA OR RNA); SEVERE ACUTE RESPIRATORY SYNDROME CORONAVIRUS 2 (SARS-COV-2) (CORONAVIRUS DISEASE [COVID-19]), AMPLIFIED PROBE TECHNIQUE, MAKING USE OF HIGH THROUGHPUT TECHNOLOGIES AS DESCRIBED BY CMS-2020-01-R: HCPCS | Performed by: NEUROLOGICAL SURGERY

## 2022-07-12 ENCOUNTER — ANESTHESIA EVENT (OUTPATIENT)
Dept: SURGERY | Facility: HOSPITAL | Age: 32
DRG: 473 | End: 2022-07-12
Payer: COMMERCIAL

## 2022-07-12 ENCOUNTER — TELEPHONE (OUTPATIENT)
Dept: PREADMISSION TESTING | Facility: HOSPITAL | Age: 32
End: 2022-07-12
Payer: COMMERCIAL

## 2022-07-12 NOTE — TELEPHONE ENCOUNTER
Called and spoke with Pt about the following; verbalized understanding.    Please arrive to [facility] ([facility] main lobby at 7:00am on 7/13/22 for your scheduled procedure. NOTHING to eat or drink after midnight, except medications instructed by the Pre-admit Nurse.     Thank you,  -Pre Admit Testing Dept.  Mon-Fri 8 am - 4 pm [phone]

## 2022-07-12 NOTE — ANESTHESIA PREPROCEDURE EVALUATION
07/12/2022  Alphonse Claude Mackey III is a 32 y.o., male.       Patient Active Problem List   Diagnosis    Panic disorder without agoraphobia with mild panic attacks    Prolapsed internal hemorrhoids, grade 2    Cervical pain    Primary hypertension    Tachycardia, paroxysmal    Other chest pain    EKG abnormalities    Imbalance    Demyelination, corpus callosum central    Persistent postural-perceptual dizziness    Claustrophobia     Pre-op Assessment    I have reviewed the Patient Summary Reports.     I have reviewed the Nursing Notes. I have reviewed the NPO Status.   I have reviewed the Medications.     Review of Systems  Anesthesia Hx:  No problems with previous Anesthesia Denies Hx of Anesthetic complications  Denies Family Hx of Anesthesia complications.   Denies Personal Hx of Anesthesia complications.   Hematology/Oncology:  Hematology Normal   Oncology Normal     EENT/Dental:EENT/Dental Normal   Cardiovascular:   Hypertension Dysrhythmias ECG has been reviewed. PSVT  Saw his cardiologist recently for medical management  Normal NMST, ECHO and Holter   Pulmonary:  Pulmonary Normal    Renal/:  Renal/ Normal     Hepatic/GI:  Hepatic/GI Normal    Musculoskeletal:   Cervical stenosis with myelopathy and radiculopathy Spine Disorders: cervical Degenerative disease    Neurological:  Neurology Normal    Endocrine:  Endocrine Normal    Dermatological:  Skin Normal    Psych:  Psychiatric Normal  Panic attacks  Agoraphobia          Physical Exam  General: Well nourished, Cooperative, Alert and Oriented    Airway:  Mallampati: II   Mouth Opening: Normal  TM Distance: Normal  Tongue: Normal  Neck ROM: Normal ROM    Dental:  Intact    Chest/Lungs:  Clear to auscultation, Normal Respiratory Rate    Heart:  Rate: Normal  Rhythm: Regular Rhythm  Sounds: Normal        Anesthesia Plan  Type of  Anesthesia, risks & benefits discussed:    Anesthesia Type: Gen ETT  Intra-op Monitoring Plan: Standard ASA Monitors and Art Line  Post Op Pain Control Plan: multimodal analgesia  Induction:  IV  Airway Plan: Direct, Post-Induction  Informed Consent: Informed consent signed with the Patient and all parties understand the risks and agree with anesthesia plan.  All questions answered.   ASA Score: 2  Day of Surgery Review of History & Physical: H&P Update referred to the surgeon/provider.    Ready For Surgery From Anesthesia Perspective.     .

## 2022-07-13 ENCOUNTER — ANESTHESIA (OUTPATIENT)
Dept: SURGERY | Facility: HOSPITAL | Age: 32
DRG: 473 | End: 2022-07-13
Payer: COMMERCIAL

## 2022-07-13 ENCOUNTER — HOSPITAL ENCOUNTER (INPATIENT)
Facility: HOSPITAL | Age: 32
LOS: 1 days | Discharge: HOME | DRG: 473 | End: 2022-07-14
Attending: NEUROLOGICAL SURGERY | Admitting: NEUROLOGICAL SURGERY
Payer: COMMERCIAL

## 2022-07-13 DIAGNOSIS — Z98.890 STATUS POST INCISION AND DRAINAGE: ICD-10-CM

## 2022-07-13 DIAGNOSIS — G95.20 CERVICAL CORD COMPRESSION WITH MYELOPATHY: Primary | ICD-10-CM

## 2022-07-13 DIAGNOSIS — M54.2 CERVICAL PAIN: ICD-10-CM

## 2022-07-13 DIAGNOSIS — F40.240 CLAUSTROPHOBIA: ICD-10-CM

## 2022-07-13 PROCEDURE — 22845 INSERT SPINE FIXATION DEVICE: CPT | Mod: 59,,, | Performed by: NEUROLOGICAL SURGERY

## 2022-07-13 PROCEDURE — 63081 PR REMV VERT BODY,CERV,ONE SGMT: ICD-10-PCS | Mod: 62,,, | Performed by: NEUROLOGICAL SURGERY

## 2022-07-13 PROCEDURE — 63600175 PHARM REV CODE 636 W HCPCS: Performed by: NURSE ANESTHETIST, CERTIFIED REGISTERED

## 2022-07-13 PROCEDURE — 71000039 HC RECOVERY, EACH ADD'L HOUR: Performed by: NEUROLOGICAL SURGERY

## 2022-07-13 PROCEDURE — 27800903 OPTIME MED/SURG SUP & DEVICES OTHER IMPLANTS: Performed by: NEUROLOGICAL SURGERY

## 2022-07-13 PROCEDURE — 22585 ARTHRD ANT NTRBD MIN DSC EA: CPT | Mod: 62,,, | Performed by: NEUROLOGICAL SURGERY

## 2022-07-13 PROCEDURE — 25000003 PHARM REV CODE 250: Performed by: NURSE ANESTHETIST, CERTIFIED REGISTERED

## 2022-07-13 PROCEDURE — 22854 PR INS BIOMECH DEV, VERT CORPECTOMY W/INTRBODY ARTHRODESIS EA INTERSPC: ICD-10-PCS | Mod: ,,, | Performed by: NEUROLOGICAL SURGERY

## 2022-07-13 PROCEDURE — 11000001 HC ACUTE MED/SURG PRIVATE ROOM

## 2022-07-13 PROCEDURE — C1713 ANCHOR/SCREW BN/BN,TIS/BN: HCPCS | Performed by: NEUROLOGICAL SURGERY

## 2022-07-13 PROCEDURE — 25000003 PHARM REV CODE 250: Performed by: NEUROLOGICAL SURGERY

## 2022-07-13 PROCEDURE — 22554 PR ARTHRODESIS ANT INTERBODY MIN DISCECTOMY, CERVICAL BELOW C2: ICD-10-PCS | Mod: 62,51,, | Performed by: NEUROLOGICAL SURGERY

## 2022-07-13 PROCEDURE — 22554 PR ARTHRODESIS ANT INTERBODY MIN DISCECTOMY, CERVICAL BELOW C2: ICD-10-PCS | Mod: 62,51,, | Performed by: OTOLARYNGOLOGY

## 2022-07-13 PROCEDURE — 20936 SP BONE AGRFT LOCAL ADD-ON: CPT | Mod: ,,, | Performed by: NEUROLOGICAL SURGERY

## 2022-07-13 PROCEDURE — 36000711: Performed by: NEUROLOGICAL SURGERY

## 2022-07-13 PROCEDURE — 37000009 HC ANESTHESIA EA ADD 15 MINS: Performed by: NEUROLOGICAL SURGERY

## 2022-07-13 PROCEDURE — 27201423 OPTIME MED/SURG SUP & DEVICES STERILE SUPPLY: Performed by: NEUROLOGICAL SURGERY

## 2022-07-13 PROCEDURE — 22585 PR ARTHRODESIS ANT INTERBODY MIN DISCECTOMY,EA ADDL: ICD-10-PCS | Mod: 62,,, | Performed by: NEUROLOGICAL SURGERY

## 2022-07-13 PROCEDURE — 36000710: Performed by: NEUROLOGICAL SURGERY

## 2022-07-13 PROCEDURE — 63081 PR REMV VERT BODY,CERV,ONE SGMT: ICD-10-PCS | Mod: 62,,, | Performed by: OTOLARYNGOLOGY

## 2022-07-13 PROCEDURE — 63081 REMOVE VERT BODY DCMPRN CRVL: CPT | Mod: 62,,, | Performed by: OTOLARYNGOLOGY

## 2022-07-13 PROCEDURE — 20930 PR ALLOGRAFT FOR SPINE SURGERY ONLY MORSELIZED: ICD-10-PCS | Mod: ,,, | Performed by: NEUROLOGICAL SURGERY

## 2022-07-13 PROCEDURE — 20930 SP BONE ALGRFT MORSEL ADD-ON: CPT | Mod: ,,, | Performed by: NEUROLOGICAL SURGERY

## 2022-07-13 PROCEDURE — 20936 PR AUTOGRAFT SPINE SURGERY LOCAL FROM SAME INCISION: ICD-10-PCS | Mod: ,,, | Performed by: NEUROLOGICAL SURGERY

## 2022-07-13 PROCEDURE — 63081 REMOVE VERT BODY DCMPRN CRVL: CPT | Mod: 62,,, | Performed by: NEUROLOGICAL SURGERY

## 2022-07-13 PROCEDURE — 99900035 HC TECH TIME PER 15 MIN (STAT)

## 2022-07-13 PROCEDURE — 63600175 PHARM REV CODE 636 W HCPCS: Performed by: NEUROLOGICAL SURGERY

## 2022-07-13 PROCEDURE — 71000033 HC RECOVERY, INTIAL HOUR: Performed by: NEUROLOGICAL SURGERY

## 2022-07-13 PROCEDURE — 22854 INSJ BIOMECHANICAL DEVICE: CPT | Mod: ,,, | Performed by: NEUROLOGICAL SURGERY

## 2022-07-13 PROCEDURE — 94799 UNLISTED PULMONARY SVC/PX: CPT

## 2022-07-13 PROCEDURE — 22585 PR ARTHRODESIS ANT INTERBODY MIN DISCECTOMY,EA ADDL: ICD-10-PCS | Mod: 62,,, | Performed by: OTOLARYNGOLOGY

## 2022-07-13 PROCEDURE — 22845 PR ANTERIOR INSTRUMENTATION 2-3 VERTEBRAL SEGMENTS: ICD-10-PCS | Mod: 59,,, | Performed by: NEUROLOGICAL SURGERY

## 2022-07-13 PROCEDURE — 22554 ARTHRD ANT NTRBD MIN DSC CRV: CPT | Mod: 62,51,, | Performed by: OTOLARYNGOLOGY

## 2022-07-13 PROCEDURE — 37000008 HC ANESTHESIA 1ST 15 MINUTES: Performed by: NEUROLOGICAL SURGERY

## 2022-07-13 PROCEDURE — 22554 ARTHRD ANT NTRBD MIN DSC CRV: CPT | Mod: 62,51,, | Performed by: NEUROLOGICAL SURGERY

## 2022-07-13 PROCEDURE — 22585 ARTHRD ANT NTRBD MIN DSC EA: CPT | Mod: 62,,, | Performed by: OTOLARYNGOLOGY

## 2022-07-13 DEVICE — SCREW ATLANTIS VA 4.0X15MM: Type: IMPLANTABLE DEVICE | Site: SPINE CERVICAL | Status: FUNCTIONAL

## 2022-07-13 DEVICE — IMPLANTABLE DEVICE: Type: IMPLANTABLE DEVICE | Site: SPINE CERVICAL | Status: FUNCTIONAL

## 2022-07-13 DEVICE — PUTTY GRAFTON DBF 1CC: Type: IMPLANTABLE DEVICE | Site: SPINE CERVICAL | Status: FUNCTIONAL

## 2022-07-13 DEVICE — SCREW ATLANTIS ACP SD 4.0X16MM: Type: IMPLANTABLE DEVICE | Site: SPINE CERVICAL | Status: FUNCTIONAL

## 2022-07-13 RX ORDER — ONDANSETRON 2 MG/ML
4 INJECTION INTRAMUSCULAR; INTRAVENOUS DAILY PRN
Status: DISCONTINUED | OUTPATIENT
Start: 2022-07-13 | End: 2022-07-13 | Stop reason: HOSPADM

## 2022-07-13 RX ORDER — ROCURONIUM BROMIDE 10 MG/ML
INJECTION, SOLUTION INTRAVENOUS
Status: DISCONTINUED | OUTPATIENT
Start: 2022-07-13 | End: 2022-07-13

## 2022-07-13 RX ORDER — DIPHENHYDRAMINE HCL 25 MG
50 CAPSULE ORAL EVERY 6 HOURS PRN
Status: DISCONTINUED | OUTPATIENT
Start: 2022-07-13 | End: 2022-07-14 | Stop reason: HOSPADM

## 2022-07-13 RX ORDER — HYDROMORPHONE HYDROCHLORIDE 2 MG/ML
1 INJECTION, SOLUTION INTRAMUSCULAR; INTRAVENOUS; SUBCUTANEOUS
Status: DISCONTINUED | OUTPATIENT
Start: 2022-07-13 | End: 2022-07-14 | Stop reason: HOSPADM

## 2022-07-13 RX ORDER — HYDROMORPHONE HYDROCHLORIDE 2 MG/ML
2 INJECTION, SOLUTION INTRAMUSCULAR; INTRAVENOUS; SUBCUTANEOUS
Status: DISCONTINUED | OUTPATIENT
Start: 2022-07-13 | End: 2022-07-14 | Stop reason: HOSPADM

## 2022-07-13 RX ORDER — HYDROCODONE BITARTRATE AND ACETAMINOPHEN 10; 325 MG/1; MG/1
1 TABLET ORAL EVERY 4 HOURS PRN
Status: DISCONTINUED | OUTPATIENT
Start: 2022-07-13 | End: 2022-07-14 | Stop reason: HOSPADM

## 2022-07-13 RX ORDER — SODIUM CHLORIDE, SODIUM LACTATE, POTASSIUM CHLORIDE, CALCIUM CHLORIDE 600; 310; 30; 20 MG/100ML; MG/100ML; MG/100ML; MG/100ML
INJECTION, SOLUTION INTRAVENOUS CONTINUOUS PRN
Status: DISCONTINUED | OUTPATIENT
Start: 2022-07-13 | End: 2022-07-13

## 2022-07-13 RX ORDER — BISACODYL 10 MG
10 SUPPOSITORY, RECTAL RECTAL DAILY
Status: DISCONTINUED | OUTPATIENT
Start: 2022-07-14 | End: 2022-07-14 | Stop reason: HOSPADM

## 2022-07-13 RX ORDER — HYDROMORPHONE HYDROCHLORIDE 2 MG/ML
0.2 INJECTION, SOLUTION INTRAMUSCULAR; INTRAVENOUS; SUBCUTANEOUS EVERY 5 MIN PRN
Status: DISCONTINUED | OUTPATIENT
Start: 2022-07-13 | End: 2022-07-13 | Stop reason: HOSPADM

## 2022-07-13 RX ORDER — PROPOFOL 10 MG/ML
VIAL (ML) INTRAVENOUS CONTINUOUS PRN
Status: DISCONTINUED | OUTPATIENT
Start: 2022-07-13 | End: 2022-07-13

## 2022-07-13 RX ORDER — SUCCINYLCHOLINE CHLORIDE 20 MG/ML
INJECTION INTRAMUSCULAR; INTRAVENOUS
Status: DISCONTINUED | OUTPATIENT
Start: 2022-07-13 | End: 2022-07-13

## 2022-07-13 RX ORDER — ACETAMINOPHEN 10 MG/ML
INJECTION, SOLUTION INTRAVENOUS
Status: DISCONTINUED | OUTPATIENT
Start: 2022-07-13 | End: 2022-07-13

## 2022-07-13 RX ORDER — DOCUSATE SODIUM 100 MG/1
100 CAPSULE, LIQUID FILLED ORAL DAILY
Status: DISCONTINUED | OUTPATIENT
Start: 2022-07-14 | End: 2022-07-14 | Stop reason: HOSPADM

## 2022-07-13 RX ORDER — VANCOMYCIN HYDROCHLORIDE 1 G/20ML
INJECTION, POWDER, LYOPHILIZED, FOR SOLUTION INTRAVENOUS
Status: DISCONTINUED | OUTPATIENT
Start: 2022-07-13 | End: 2022-07-13 | Stop reason: ALTCHOICE

## 2022-07-13 RX ORDER — HYDROMORPHONE HYDROCHLORIDE 2 MG/ML
INJECTION, SOLUTION INTRAMUSCULAR; INTRAVENOUS; SUBCUTANEOUS
Status: DISCONTINUED | OUTPATIENT
Start: 2022-07-13 | End: 2022-07-13

## 2022-07-13 RX ORDER — AMOXICILLIN 250 MG
2 CAPSULE ORAL NIGHTLY PRN
Status: DISCONTINUED | OUTPATIENT
Start: 2022-07-13 | End: 2022-07-14 | Stop reason: HOSPADM

## 2022-07-13 RX ORDER — PROPRANOLOL HYDROCHLORIDE 20 MG/1
20 TABLET ORAL 2 TIMES DAILY
Status: DISCONTINUED | OUTPATIENT
Start: 2022-07-13 | End: 2022-07-14 | Stop reason: HOSPADM

## 2022-07-13 RX ORDER — ONDANSETRON 2 MG/ML
INJECTION INTRAMUSCULAR; INTRAVENOUS
Status: DISCONTINUED | OUTPATIENT
Start: 2022-07-13 | End: 2022-07-13

## 2022-07-13 RX ORDER — ACETAMINOPHEN 325 MG/1
650 TABLET ORAL EVERY 6 HOURS PRN
Status: DISCONTINUED | OUTPATIENT
Start: 2022-07-13 | End: 2022-07-14 | Stop reason: HOSPADM

## 2022-07-13 RX ORDER — PROPOFOL 10 MG/ML
VIAL (ML) INTRAVENOUS
Status: DISCONTINUED | OUTPATIENT
Start: 2022-07-13 | End: 2022-07-13

## 2022-07-13 RX ORDER — DEXAMETHASONE SODIUM PHOSPHATE 4 MG/ML
INJECTION, SOLUTION INTRA-ARTICULAR; INTRALESIONAL; INTRAMUSCULAR; INTRAVENOUS; SOFT TISSUE
Status: DISCONTINUED | OUTPATIENT
Start: 2022-07-13 | End: 2022-07-13

## 2022-07-13 RX ORDER — MAG HYDROX/ALUMINUM HYD/SIMETH 200-200-20
30 SUSPENSION, ORAL (FINAL DOSE FORM) ORAL EVERY 4 HOURS PRN
Status: DISCONTINUED | OUTPATIENT
Start: 2022-07-13 | End: 2022-07-14 | Stop reason: HOSPADM

## 2022-07-13 RX ORDER — DEXMEDETOMIDINE HYDROCHLORIDE 100 UG/ML
INJECTION, SOLUTION INTRAVENOUS
Status: DISCONTINUED | OUTPATIENT
Start: 2022-07-13 | End: 2022-07-13

## 2022-07-13 RX ORDER — ONDANSETRON 8 MG/1
8 TABLET, ORALLY DISINTEGRATING ORAL EVERY 6 HOURS PRN
Status: DISCONTINUED | OUTPATIENT
Start: 2022-07-13 | End: 2022-07-14 | Stop reason: HOSPADM

## 2022-07-13 RX ORDER — MIDAZOLAM HYDROCHLORIDE 1 MG/ML
INJECTION INTRAMUSCULAR; INTRAVENOUS
Status: DISCONTINUED | OUTPATIENT
Start: 2022-07-13 | End: 2022-07-13

## 2022-07-13 RX ORDER — DIAZEPAM 5 MG/1
5 TABLET ORAL EVERY 6 HOURS PRN
Status: DISCONTINUED | OUTPATIENT
Start: 2022-07-13 | End: 2022-07-14 | Stop reason: HOSPADM

## 2022-07-13 RX ORDER — OXYCODONE AND ACETAMINOPHEN 5; 325 MG/1; MG/1
1 TABLET ORAL EVERY 4 HOURS PRN
Status: DISCONTINUED | OUTPATIENT
Start: 2022-07-13 | End: 2022-07-14 | Stop reason: HOSPADM

## 2022-07-13 RX ORDER — LIDOCAINE HYDROCHLORIDE 20 MG/ML
INJECTION INTRAVENOUS
Status: DISCONTINUED | OUTPATIENT
Start: 2022-07-13 | End: 2022-07-13

## 2022-07-13 RX ORDER — NEOSTIGMINE METHYLSULFATE 1 MG/ML
INJECTION, SOLUTION INTRAVENOUS
Status: DISCONTINUED | OUTPATIENT
Start: 2022-07-13 | End: 2022-07-13

## 2022-07-13 RX ORDER — PROCHLORPERAZINE EDISYLATE 5 MG/ML
5 INJECTION INTRAMUSCULAR; INTRAVENOUS EVERY 6 HOURS PRN
Status: DISCONTINUED | OUTPATIENT
Start: 2022-07-13 | End: 2022-07-14 | Stop reason: HOSPADM

## 2022-07-13 RX ORDER — VANCOMYCIN HCL IN 5 % DEXTROSE 1G/250ML
1000 PLASTIC BAG, INJECTION (ML) INTRAVENOUS
Status: COMPLETED | OUTPATIENT
Start: 2022-07-13 | End: 2022-07-13

## 2022-07-13 RX ORDER — KETAMINE HYDROCHLORIDE 10 MG/ML
INJECTION, SOLUTION INTRAMUSCULAR; INTRAVENOUS
Status: DISCONTINUED | OUTPATIENT
Start: 2022-07-13 | End: 2022-07-13

## 2022-07-13 RX ADMIN — DEXAMETHASONE SODIUM PHOSPHATE 8 MG: 4 INJECTION, SOLUTION INTRAMUSCULAR; INTRAVENOUS at 08:07

## 2022-07-13 RX ADMIN — PROPRANOLOL HYDROCHLORIDE 20 MG: 20 TABLET ORAL at 09:07

## 2022-07-13 RX ADMIN — PROPOFOL 50 MG: 10 INJECTION, EMULSION INTRAVENOUS at 08:07

## 2022-07-13 RX ADMIN — SUCCINYLCHOLINE CHLORIDE 160 MG: 20 INJECTION, SOLUTION INTRAMUSCULAR; INTRAVENOUS at 08:07

## 2022-07-13 RX ADMIN — SODIUM CHLORIDE, SODIUM LACTATE, POTASSIUM CHLORIDE, AND CALCIUM CHLORIDE: 600; 310; 30; 20 INJECTION, SOLUTION INTRAVENOUS at 08:07

## 2022-07-13 RX ADMIN — PROPOFOL 160 MCG/KG/MIN: 10 INJECTION, EMULSION INTRAVENOUS at 10:07

## 2022-07-13 RX ADMIN — DEXMEDETOMIDINE HYDROCHLORIDE 10 MCG: 100 INJECTION, SOLUTION, CONCENTRATE INTRAVENOUS at 10:07

## 2022-07-13 RX ADMIN — HYDROCODONE BITARTRATE AND ACETAMINOPHEN 1 TABLET: 10; 325 TABLET ORAL at 07:07

## 2022-07-13 RX ADMIN — KETAMINE HYDROCHLORIDE 25 MG: 10 INJECTION INTRAMUSCULAR; INTRAVENOUS at 09:07

## 2022-07-13 RX ADMIN — LIDOCAINE HYDROCHLORIDE 100 MG: 20 INJECTION, SOLUTION INTRAVENOUS at 08:07

## 2022-07-13 RX ADMIN — SODIUM CHLORIDE, SODIUM LACTATE, POTASSIUM CHLORIDE, AND CALCIUM CHLORIDE: 600; 310; 30; 20 INJECTION, SOLUTION INTRAVENOUS at 11:07

## 2022-07-13 RX ADMIN — ROCURONIUM BROMIDE 30 MG: 10 INJECTION, SOLUTION INTRAVENOUS at 09:07

## 2022-07-13 RX ADMIN — KETAMINE HYDROCHLORIDE 25 MG: 10 INJECTION INTRAMUSCULAR; INTRAVENOUS at 08:07

## 2022-07-13 RX ADMIN — SODIUM CHLORIDE: 0.9 INJECTION, SOLUTION INTRAVENOUS at 08:07

## 2022-07-13 RX ADMIN — DEXMEDETOMIDINE HYDROCHLORIDE 30 MCG: 100 INJECTION, SOLUTION, CONCENTRATE INTRAVENOUS at 01:07

## 2022-07-13 RX ADMIN — VANCOMYCIN HYDROCHLORIDE 1000 MG: 1 INJECTION, POWDER, FOR SOLUTION INTRAVENOUS at 09:07

## 2022-07-13 RX ADMIN — ACETAMINOPHEN 1000 MG: 10 INJECTION, SOLUTION INTRAVENOUS at 01:07

## 2022-07-13 RX ADMIN — GLYCOPYRROLATE 0.2 MG: 0.2 INJECTION, SOLUTION INTRAMUSCULAR; INTRAVENOUS at 02:07

## 2022-07-13 RX ADMIN — MIDAZOLAM HYDROCHLORIDE 2 MG: 1 INJECTION, SOLUTION INTRAMUSCULAR; INTRAVENOUS at 08:07

## 2022-07-13 RX ADMIN — NEOSTIGMINE METHYLSULFATE 3 MG: 1 INJECTION INTRAVENOUS at 02:07

## 2022-07-13 RX ADMIN — HYDROMORPHONE HYDROCHLORIDE 0.5 MG: 2 INJECTION INTRAMUSCULAR; INTRAVENOUS; SUBCUTANEOUS at 02:07

## 2022-07-13 RX ADMIN — VANCOMYCIN HYDROCHLORIDE 1500 MG: 1 INJECTION, POWDER, LYOPHILIZED, FOR SOLUTION INTRAVENOUS at 08:07

## 2022-07-13 RX ADMIN — ONDANSETRON 8 MG: 2 INJECTION, SOLUTION INTRAMUSCULAR; INTRAVENOUS at 02:07

## 2022-07-13 RX ADMIN — PROPOFOL 150 MG: 10 INJECTION, EMULSION INTRAVENOUS at 08:07

## 2022-07-13 RX ADMIN — SUCCINYLCHOLINE CHLORIDE 40 MG: 20 INJECTION, SOLUTION INTRAMUSCULAR; INTRAVENOUS at 08:07

## 2022-07-13 RX ADMIN — PROPOFOL 160 MCG/KG/MIN: 10 INJECTION, EMULSION INTRAVENOUS at 09:07

## 2022-07-13 RX ADMIN — REMIFENTANIL HYDROCHLORIDE 0.1 MCG/KG/MIN: 1 INJECTION, POWDER, LYOPHILIZED, FOR SOLUTION INTRAVENOUS at 08:07

## 2022-07-13 RX ADMIN — GLYCOPYRROLATE 0.4 MG: 0.2 INJECTION, SOLUTION INTRAMUSCULAR; INTRAVENOUS at 02:07

## 2022-07-13 RX ADMIN — PROPOFOL 150 MCG/KG/MIN: 10 INJECTION, EMULSION INTRAVENOUS at 08:07

## 2022-07-13 RX ADMIN — DEXMEDETOMIDINE HYDROCHLORIDE 10 MCG: 100 INJECTION, SOLUTION, CONCENTRATE INTRAVENOUS at 09:07

## 2022-07-13 NOTE — TRANSFER OF CARE
"Anesthesia Transfer of Care Note    Patient: Alphonse Claude Mackey III    Procedure(s) Performed: Procedure(s) (LRB):  DISCECTOMY, SPINE, CERVICAL, ANTERIOR APPROACH, WITH FUSION (Left)  DISSECTION, NECK (Left)    Patient location: PACU    Anesthesia Type: general    Transport from OR: Transported from OR on room air with adequate spontaneous ventilation    Post pain: adequate analgesia    Post assessment: no apparent anesthetic complications    Post vital signs: stable    Level of consciousness: sedated    Nausea/Vomiting: no nausea/vomiting    Complications: none    Transfer of care protocol was followed      Last vitals:   Visit Vitals  BP (!) 160/100   Pulse 68   Temp 36.6 °C (97.9 °F) (Temporal)   Resp 18   Ht 5' 10" (1.778 m)   Wt 98.9 kg (218 lb 2.3 oz)   SpO2 100%   BMI 31.30 kg/m²     "

## 2022-07-13 NOTE — H&P
[facility] ([facility])  Neurosurgery  Progress Note    Subjective:     Patient here for pre op   Neck pain in the past N/T and weakness through the B/L UE   L> R  MR consistent with his myelopathic symptoms   Severe disc degeneratiion with herniation and cord signal change C3-4/4-5  Consented for C4 corpectomy with fusion C3-5    Post-Op Info:  Procedure(s) (LRB):  DISCECTOMY, SPINE, CERVICAL, ANTERIOR APPROACH, WITH FUSION (Left)  DISSECTION, NECK (Left)   Day of Surgery           Review of Systems   Constitutional: Negative for activity change, appetite change and chills.   HENT: Negative for congestion and ear pain.    Eyes: Negative for photophobia, redness and visual disturbance.   Respiratory: Negative for apnea and chest tightness.    Cardiovascular: Negative for chest pain.   Gastrointestinal: Negative for abdominal distention and abdominal pain.   Endocrine: Negative for cold intolerance.   Genitourinary: Negative for difficulty urinating.   Musculoskeletal: Positive for myalgias and neck stiffness. Negative for arthralgias.   Skin: Negative for color change.   Allergic/Immunologic: Negative for environmental allergies.   Neurological: Positive for weakness and numbness. Negative for dizziness.   Hematological: Negative for adenopathy. Does not bruise/bleed easily.   Psychiatric/Behavioral: Negative for agitation, behavioral problems and confusion.     Objective:     Weight: 98.9 kg (218 lb 2.3 oz)  Body mass index is 31.3 kg/m².  Vital Signs (Most Recent):  Temp: 97.9 °F (36.6 °C) (07/13/22 0753)  Pulse: 68 (07/13/22 0753)  Resp: 18 (07/13/22 0753)  BP: (!) 160/100 (07/13/22 0753)  SpO2: 100 % (07/13/22 0753) Vital Signs (24h Range):  Temp:  [97.9 °F (36.6 °C)] 97.9 °F (36.6 °C)  Pulse:  [68] 68  Resp:  [18] 18  SpO2:  [100 %] 100 %  BP: (160)/(100) 160/100                          Neurosurgery Physical Exam    Significant Labs:  No results for input(s): GLU, NA, K, CL, CO2, BUN, CREATININE, CALCIUM,  MG in the last 48 hours.  No results for input(s): WBC, HGB, HCT, PLT in the last 48 hours.  No results for input(s): LABPT, INR, APTT in the last 48 hours.  Microbiology Results (last 7 days)     ** No results found for the last 168 hours. **        MR cervical :  C3-C4:  Central disc protrusion resulting in effacement of the thecal sac and at least mild effacement of the cord.  The AP dimension of the central canal at this level measures approximately 7 mm.  No neural foraminal canal narrowing.     C4-C5:  Prominent central disc protrusion resulting in effacement of the thecal sac and moderate to severe effacement the cord with the AP dimension of the central canal at this level measuring 5 mm.  The neural foraminal canals are patent.  There is equivocal slight increase in cord signal, which would be most consistent with myelopathy.    C5-C6:  Very minimal diffuse disc bulge resulting in very minimal effacement of ventral thecal sac.  No abutment of the cord.  No neural foraminal canal narrowing.       CT cervical     C3-C4: Small posterior disc protrusion series 2, image 151 which indents the ventral thecal sac.  Narrowing of the spinal canal is seen with a minimum AP diameter of 7 mm, unchanged at the level of the disc space.  No neural foraminal encroachment.  There is ossification of the posterior longitudinal ligament posterior to C4 which indents the thecal sac.  Posterior to C4 vertebral body minimum canal diameter measures on the order of 8 mm.     C4-C5: Posterior disc protrusion which effaces the ventral thecal sac.  Narrowing of the spinal canal at this level which demonstrates a minimum AP diameter 6 mm.  No neural foraminal narrowing. Mild ossification of posterior longitudinal ligament posterior to the upper aspect of C5 vertebral body is seen which indents the ventral thecal sac.  Assessment/Plan:   To OR for decompression and fusion C3-5   ENT Dr Nuno to assist     The patient was informed of all  benefits and potential risk of the operation including but not limited to:  Pain, infection, bleeding, coma, paralysis, death.  Cerebrospinal fluid leak, failure of any instrumentation, the need for additional procedures in the future. No guarantee was given that this procedure would alleviate all of the symptoms.       Thank you for the referral   Please call with any questions    Yash Fortune MD  Neurosurgery     Disclaimer: This note was prepared using a voice recognition system and is likely to have sound alike errors within the text.        Yash Fortune MD  Neurosurgery  [facility] - [facility] ([facility])

## 2022-07-13 NOTE — ANESTHESIA PROCEDURE NOTES
Intubation    Date/Time: 7/13/2022 8:24 AM  Performed by: Makenna Gomez CRNA  Authorized by: Anish Currie MD     Intubation:     Induction:  Intravenous    Intubated:  Postinduction    Mask Ventilation:  Easy mask    Attempts:  1    Attempted By:  Student    Method of Intubation:  Video laryngoscopy    Blade:  Asif 3    Laryngeal View Grade: Grade I - full view of cords      Difficult Airway Encountered?: No      Complications:  None    Airway Device:  EMG ETT (NIMS)    Airway Device Size:  7.0    Style/Cuff Inflation:  Cuffed (inflated to minimal occlusive pressure)    Tube secured:  23    Secured at:  The teeth    Placement Verified By:  Capnometry    Complicating Factors:  None    Findings Post-Intubation:  BS equal bilateral and atraumatic/condition of teeth unchanged  Notes:      Performed by AVINASH Ferreira  Head/neck maintained in neutral position throughout induction

## 2022-07-13 NOTE — ANESTHESIA PROCEDURE NOTES
Arterial    Diagnosis: m54.12    Patient location during procedure: done in OR  Procedure start time: 7/13/2022 8:34 AM  Timeout: 7/13/2022 8:34 AM  Procedure end time: 7/13/2022 8:38 AM    Staffing  Authorizing Provider: Anish Currie MD  Performing Provider: Makenna Gomez CRNA      Preanesthetic Checklist  Completed: patient identified, IV checked, site marked, risks and benefits discussed, surgical consent, monitors and equipment checked, pre-op evaluation, timeout performed and anesthesia consent givenArterial  Skin Prep: chlorhexidine gluconate and isopropyl alcohol  Local Infiltration: none  Orientation: left  Location: radial    Catheter Size: 20 G  Catheter placement by Anatomical landmarks. Heme positive aspiration all ports. Insertion Attempts: 2  Assessment  Dressing: secured with tape and tegaderm  Patient: Tolerated well  Additional Notes  Performed by AVINASH arevalo

## 2022-07-13 NOTE — ANESTHESIA POSTPROCEDURE EVALUATION
Anesthesia Post Evaluation    Patient: Alphonse Claude Mackey III    Procedure(s) Performed: Procedure(s) (LRB):  DISCECTOMY, SPINE, CERVICAL, ANTERIOR APPROACH, WITH FUSION (Left)  DISSECTION, NECK (Left)    Final Anesthesia Type: general      Patient location during evaluation: PACU  Patient participation: Yes- Able to Participate  Level of consciousness: awake and alert  Post-procedure vital signs: reviewed and stable  Pain management: adequate  Airway patency: patent  GEORGE mitigation strategies: Verification of full reversal of neuromuscular block  PONV status at discharge: No PONV  Anesthetic complications: no      Cardiovascular status: hemodynamically stable  Respiratory status: spontaneous ventilation  Hydration status: euvolemic  Follow-up not needed.          Vitals Value Taken Time   /69 07/13/22 1536   Temp 37.6 °C (99.6 °F) 07/13/22 1445   Pulse 108 07/13/22 1540   Resp 8 07/13/22 1540   SpO2 96 % 07/13/22 1540   Vitals shown include unvalidated device data.      No case tracking events are documented in the log.      Pain/Nevaeh Score: Nevaeh Score: 7 (7/13/2022  3:15 PM)

## 2022-07-13 NOTE — BRIEF OP NOTE
[facility] ([facility])  Brief Operative Note    SUMMARY     Surgery Date: 7/13/2022     Surgeon(s) and Role:  Panel 1:     * Yash Fortune MD - Primary  Panel 2:     * Westley Nuno MD - Primary    Assisting Surgeon: None    Pre-op Diagnosis:  Cervical radiculopathy [M54.12]  Degenerative disc disease, cervical [M50.30]  Myelopathy concurrent with and due to spinal stenosis of cervical region [M48.02, G99.2]    Post-op Diagnosis:  Post-Op Diagnosis Codes:     * Cervical radiculopathy [M54.12]     * Degenerative disc disease, cervical [M50.30]     * Myelopathy concurrent with and due to spinal stenosis of cervical region [M48.02, G99.2]    Procedure(s) (LRB):  C4 corpectomy     Anesthesia: General    Operative Findings: posterior osteophyte with cord compression C4   Ossification of the PLL  Estimated Blood Loss: 250 mL    Estimated Blood Loss has been documented.         Specimens:   Specimen (24h ago, onward)            None          [id number]

## 2022-07-13 NOTE — OP NOTE
Operative Note       Surgery Date: 7/13/2022     Surgeon: Westley Nuno MD    Co Surgeon:  Yash Fortune MD    Implants:  None by ENT    Pre-op Diagnosis:  Cervical radiculopathy [M54.12]  Degenerative disc disease, cervical [M50.30]  Myelopathy concurrent with and due to spinal stenosis of cervical region [M48.02, G99.2]    Post-op Diagnosis: same    Anesthesia: GETA    Technical Procedures Used:     ACDF C3-5    Complications: No    Estimated Blood Loss:20cc           Specimens (From admission, onward)    None          Justification for the operation:     Raj is a 32 y.o. male who presents for ACDF by neurosurgery.  Due to the patient's anatomy and/or body habitus, I have been requested to provide exposure of the anterior spine.    Procedure in Detail:        The patient was placed supine after induction of a general anesthetic.  The neck was extended and prepped and draped in standard fashion.  A 4 cm transverse cervical incision was created extending from the anterior border of the sternocleidomastoid muscle anteriorly.  This was carried down through the subcutaneous tissue.  The platysma was divided.  The omohyoid muscle was identified and retracted inferiorly.  The internal jugular vein was identified.  I dissected medial to this and identified the anterior border of the internal carotid artery.  The fascia in this space was divided allowing for lateral retraction of the internal carotid and internal jugular.  The larynx was retracted medially.  The longus coli was then identified.  I dissected medially to this and identified the anterior body of the C3-5.  A  needle was placed.  Flouroscopy was used to confirm position. The fascia was swept away from the spine allowing for exposure of the cervical bodies and interdisc spaces above and below.      At this point, the patient was handed over to Dr. Fortune for the remaining portions of the case.                Disposition: PACU - hemodynamically  stable.           Condition: Good    Attestation:  I performed the surgical approach for this procedure.

## 2022-07-14 VITALS
WEIGHT: 218.13 LBS | HEIGHT: 70 IN | RESPIRATION RATE: 18 BRPM | OXYGEN SATURATION: 98 % | HEART RATE: 60 BPM | BODY MASS INDEX: 31.23 KG/M2 | TEMPERATURE: 98 F | SYSTOLIC BLOOD PRESSURE: 141 MMHG | DIASTOLIC BLOOD PRESSURE: 85 MMHG

## 2022-07-14 PROBLEM — M54.12 CERVICAL RADICULOPATHY: Status: ACTIVE | Noted: 2022-07-14

## 2022-07-14 PROBLEM — M54.2 CERVICAL PAIN: Chronic | Status: ACTIVE | Noted: 2021-12-10

## 2022-07-14 PROBLEM — M50.30 DEGENERATIVE DISC DISEASE, CERVICAL: Status: ACTIVE | Noted: 2022-07-14

## 2022-07-14 PROCEDURE — 97161 PT EVAL LOW COMPLEX 20 MIN: CPT

## 2022-07-14 PROCEDURE — 99900035 HC TECH TIME PER 15 MIN (STAT)

## 2022-07-14 PROCEDURE — 25000003 PHARM REV CODE 250: Performed by: NEUROLOGICAL SURGERY

## 2022-07-14 PROCEDURE — 97530 THERAPEUTIC ACTIVITIES: CPT

## 2022-07-14 RX ORDER — HYDROCODONE BITARTRATE AND ACETAMINOPHEN 10; 325 MG/1; MG/1
1 TABLET ORAL EVERY 6 HOURS PRN
Qty: 60 TABLET | Refills: 0 | Status: SHIPPED | OUTPATIENT
Start: 2022-07-14 | End: 2022-07-29

## 2022-07-14 RX ORDER — METHOCARBAMOL 750 MG/1
750 TABLET, FILM COATED ORAL 3 TIMES DAILY PRN
Qty: 60 TABLET | Refills: 0 | Status: SHIPPED | OUTPATIENT
Start: 2022-07-14 | End: 2022-07-29

## 2022-07-14 RX ADMIN — PROPRANOLOL HYDROCHLORIDE 20 MG: 20 TABLET ORAL at 09:07

## 2022-07-14 RX ADMIN — DOCUSATE SODIUM 100 MG: 100 CAPSULE, LIQUID FILLED ORAL at 09:07

## 2022-07-14 RX ADMIN — HYDROCODONE BITARTRATE AND ACETAMINOPHEN 1 TABLET: 10; 325 TABLET ORAL at 06:07

## 2022-07-14 RX ADMIN — HYDROCODONE BITARTRATE AND ACETAMINOPHEN 1 TABLET: 10; 325 TABLET ORAL at 02:07

## 2022-07-14 RX ADMIN — HYDROCODONE BITARTRATE AND ACETAMINOPHEN 1 TABLET: 10; 325 TABLET ORAL at 12:07

## 2022-07-14 NOTE — OP NOTE
[facility]  Neurosurgery  Operative Note    SUMMARY      Date of Procedure: 7/13/2022     Procedure: Procedure(s) (LRB):  DISCECTOMY, SPINE, CERVICAL, ANTERIOR APPROACH, WITH FUSION (Left)  DISSECTION, NECK (Left)   C4 corpectomy     Surgeon(s) and Role:  Panel 1:     * Yash Fortune MD - Primary  Panel 2:     * Westley Nuno MD - Primary    Assisting Surgeon: None    Pre-Operative Diagnosis: Cervical radiculopathy [M54.12]  Degenerative disc disease, cervical [M50.30]  Myelopathy concurrent with and due to spinal stenosis of cervical region [M48.02, G99.2]    Post-Operative Diagnosis: Post-Op Diagnosis Codes:     * Cervical radiculopathy [M54.12]     * Degenerative disc disease, cervical [M50.30]     * Myelopathy concurrent with and due to spinal stenosis of cervical region [M48.02, G99.2]    Anesthesia: General    Operative Findings (including complications, if any):   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  Cervical corpectomy diskectomy decompression and bilateral foraminotomy C4 with removal of 100% of the vertebral body  2.  Cervical arthrodesis C3-5  3. Cervical discectomy and foraminotomy bilateral C3-4,4-5   5.  Placement of structural  interbody cage C3-5  7.  Use of allograft bone   8.  Use of autograft bone   9.  Placement of anterior cervical plate C3-5  10.  Use of microscope for dissection during corpectomy  11.  Use of intraoperative neuro monitoring        Estimated Blood Loss (EBL): 250 mL           Specimens:   Specimen (24h ago, onward)            None           Implants:   Implant Name Type Inv. Item Serial No.  Lot No. LRB No. Used Action   PIN DISTRACTION 12MM - [id number]  PIN DISTRACTION 12MM  AESCULAP  Left 3 Implanted and Explanted   [id number] Bone  [id number] [facility] [employer]  Left 1 Implanted   [id number]  PUTTY [employer]OLONIA DBF [facility] [id number] [employer] [facility]  Left 1 Implanted   PLATE ATLANTIS ANT CERV  35MM - [id number]  PLATE ATLANTIS ANT CERV 35MM  [employer]  Left 1 Implanted   SCREW ATLANTIS VA 4.0X15MM - [id number]  SCREW ATLANTIS VA 4.0X15MM  [employer]  Left 2 Implanted   SCREW ATLANTIS ACP SD 4.0X16MM - [id number]  SCREW ATLANTIS ACP SD 4.0X16MM  [employer]  Left 2 Implanted              Condition: Good    Disposition: PACU - hemodynamically stable.    Attestation: I was present and scrubbed for the entire procedure.     Patient is a 32-year-old gentleman who presented to my clinic with progressive headaches dizziness as well as weakness in the left upper extremity difficulty with fine motor tasks as well as strength 1 level left greater than the right side.  He had noted progressive weakness on the left.  MRI did show posterior stenosis with ossification of the posterior longitudinal ligament behind the body of C4 causing stenosis at c3-4 and c4-5.  Patient was initially seen by Neurology for override is symptoms and then referred to my clinic  Due to the progressive weakness in the left upper extremity patient was elected to undergo procedure for decompression and stabilization removal posterior osteophytes spine the body of C4      The patient was informed of all benefits and potential risk of the operation including but not limited to:  Pain, infection, bleeding, coma, paralysis, death.  Cerebrospinal fluid leak, failure of any instrumentation, the need for additional procedures in the future. No guarantee was given that this procedure would alleviate all of the symptoms.     The patient was transferred onto the operating room table.  He was given preoperative prophylactic IV antibiotics.  The patient was sedated and intubated without difficulty by the anesthesia service.  Eyes were taped shut after ointment was applied to prevent corneal abrasion.  A Kary Hugger was placed over the lower body to maintain control of the core body temperature.  A Rose catheter was inserted.  The neuro  monitoring service then fixated there electrodes in the appropriate position.  The patient was placed supine with a sheet under the shoulder and a gel roll under the head.  All pressure points were carefully padded.     The skin was prepped and draped in the standard surgical fashion in the planned incision was made using on marker pen as well as fluoroscopy.    at this point Dr. Nuno  Made the initial incision and exposure was made of the anterior vertebral body from C3-C5  The self retaining retractors were placed.     Once this portion was complete the anterior cervical spine was exposed.  A needle was placed into the disc space to confirm the the cervical vertebral bodies of C3-5 were dissected         The C-arm fluoroscopy unit was draped with sterile drapes and brought into the operative field.  The C 3-4, 4-5 spaces were confirmed by placing a marker needle into the disc space and visualized with fluoroscopy.      At t this point Dr. Nuno exited the case and I proceeded to perform the  remaing portions of the procedure.  The longus coli was undermined on either side using monopolar cautery along to expose the uncovertebral joint.  Self retraining retractors were inserted underneath the longus coli musculature.       A #11 Blade was used to initiate the diskectomy after incision of the annulus.  The diskectomy was carried out utilizing pituitary rongeurs, Kerrison Cabezas and curettes to widen and cleaned the disc space.  The operating microscope was draped with sterile drapes and brought into the operative field and with microsurgical technique; the diskectomy was carried down to the level of the posterior longitudinal ligament and widened laterally to reveal bilateral uncovertebral joints C3-4 / 4-5 .      Using and upgoing curettes the posterior longitudinal ligament was undermined.  Care since were used to decompress the thecal sac.   There was evidence of OPLL with severe stenosis and compression of the  thecal sac. Dissection was taken out laterally to make sure the foramen were decompressed bilaterally.  There was a  posterior osteophyte as well as a disc causing thecal sac compression in particular behind the vertebral body of C4.      once this was done the endplates were cleaned of any disc material. A structural donor bone graft was measured and cut to size. It was filled with a combination of of autograft as well as DBM allograft. It was placed to the anterior space without difficulty. Its position was confirmed with fluoroscopy      Neck an anterior plate was placed  over the corpectomy cage C3-C5. This was secured and locked into place with for 15 mm screws which were then locked into their final position      Fluoroscopy was used to confirm good placement of the cage screws. The wound was irrigated with bacitracin solution and hemostasis was of trained.  1 g of vancomycin powder was placed to the surgical cavity.  And  7 flat Hemovac  drain was left in the place in the prevertebral space. Platysma was approximated using 0 Vicryl sutures as and  2-0 Vicryl sutures on the subcutaneous tissue.  Dermabond was used on the skin.     Sponge, needle and instrument counts were all accounted for at the end of the case x2.  The patient tolerated the procedure well and was transferred to the recovery room in a stable condition.  I was present for the entire portion of the procedure.     Plaease note that this patient anatomy was particularly difficult and I took substantially longer in this case secondary to the patients anatomy      Neuro monitoring remained stable through the procedure.  SSEP and EMG readings were stable throughout. Prior to the procedure patient did not have recordings in the lower extremities. The upper extremity readings remained stable throughout the case      please note this was a particularly difficult case requiring the use of 2 surgeons secondary to the patient's thyroid mass making the  initial exposure difficult and requiring longer time than usual with increased complexity of case        Thank you for the referral   Please call with any questions     Yash Fortune MD  Neurosurgery      Disclaimer: This note was prepared using a voice recognition system and is likely to have sound alike errors within the text.

## 2022-07-14 NOTE — PT/OT/SLP EVAL
Physical Therapy Evaluation and Discharge Note    Patient Name:  Alphonse Claude Mackey III   MRN:  [id number]    Recommendations:     Discharge Recommendations:  home   Discharge Equipment Recommendations: none   Barriers to discharge: None    Assessment:     Alphonse Claude Mackey III is a 32 y.o. male admitted with a medical diagnosis of Degenerative disc disease, cervical. .  At this time, patient is functioning at their prior level of function and does not require further acute PT services.     Recent Surgery: Procedure(s) (LRB):  DISCECTOMY, SPINE, CERVICAL, ANTERIOR APPROACH, WITH FUSION (Left)  DISSECTION, NECK (Left) 1 Day Post-Op    Plan:     During this hospitalization, patient does not require further acute PT services.  Please re-consult if situation changes.      Subjective     Chief Complaint: NECK PAIN   Patient/Family Comments/goals: INC MOBIILTY   Pain/Comfort:  · Pain Rating 1: 2/10  · Location 1: neck    Patients cultural, spiritual, Adventist conflicts given the current situation:      Living Environment:   PT LIVES AT HOME ALONE IN A ONE STORY HOME WITH NO STEPS   Prior to admission, patients level of function was IND AND DRIVES.  Equipment used at home: none.  DME owned (not currently used): none.  Upon discharge, patient will have assistance from FAMILY.    Objective:     Communicated with NURSE TERESA AND Epic CHART REVIEW  prior to session.  Patient found ambulatory in room/goff with peripheral IV, cervical collar upon PT entry to room.    General Precautions: Standard,     Orthopedic Precautions:spinal precautions   Braces: Aspen collar   Respiratory Status: Room air    Exams:  · RLE ROM: WNL  · RLE Strength: WNL  · LLE ROM: WNL  · LLE Strength: WNL    Functional Mobility:  · Bed Mobility:     · Supine to Sit: independence  · Transfers:     · Sit to Stand:  independence with no AD  · Bed to Chair: independence with  no AD  using  Stand Pivot  · Gait: PT IND WITH AMBULATION    AM-PAC 6  CLICK MOBILITY  Total Score:21       Therapeutic Activities and Exercises:   PT AMBULATED X 1000' + IN HALLWAY IND. PT TO ROOM AND EDUCATED ON SPINAL PRECAUTIONS( NO BLT )AND TO KEEP C COLLAR ON AT ALL TIMES. PT RETURNED TO RM AND LEFT WITH ALL NEEDS MET AND CALL BELL IN REACH.     AM-PAC 6 CLICK MOBILITY  Total Score:21     Patient left up in chair with call button in reach.    GOALS:   Multidisciplinary Problems     Physical Therapy Goals     Not on file                History:     Past Medical History:   Diagnosis Date    Anxiety     Cervical radiculopathy 7/14/2022    Primary hypertension 1/6/2022       Past Surgical History:   Procedure Laterality Date    ADENOIDECTOMY      ANTERIOR CERVICAL DISCECTOMY W/ FUSION Left 7/13/2022    Procedure: DISCECTOMY, SPINE, CERVICAL, ANTERIOR APPROACH, WITH FUSION;  Surgeon: Yash Fortune MD;  Location: [facility] OR;  Service: Neurosurgery;  Laterality: Left;  ACDF vs Corpectomy C3-5  Will need Dr. Nuno    DISSECTION OF NECK Left 7/13/2022    Procedure: DISSECTION, NECK;  Surgeon: Westley Nuno MD;  Location: [facility] OR;  Service: ENT;  Laterality: Left;  ACDF vs Corpectomy C3-5    TONSILLECTOMY         Time Tracking:     PT Received On: 07/14/22  PT Start Time: 0810     PT Stop Time: 0835  PT Total Time (min): 25 min     Billable Minutes: Evaluation 15 and Therapeutic Activity 10      07/14/2022

## 2022-07-14 NOTE — DISCHARGE INSTRUCTIONS
Wear Aspen collar at all times except for taking a shower. Okay to loosen when eating.   Adjust collar on side with velcro straps. Loosen/tighten with yellow and white knob on front of collar.     No NSAIDs (aleve, ibuprofen, advil, celebrex, etc.) for 3 months.    Please resume blood thinners (if taking any) in 5 days!    Minimize bending and lifting. No lifting anything greater than 5-10 lbs for first two weeks.

## 2022-07-14 NOTE — NURSING
Discharge instructions reviewed with patient and family at bedside, paperwork handed to patient. IV d/c'd. Patient transported home with family via car.

## 2022-07-14 NOTE — PLAN OF CARE
[facility] Surg  Initial Discharge Assessment       Primary Care Provider: Claus Goodman MD    Admission Diagnosis: Cervical radiculopathy [M54.12]  Degenerative disc disease, cervical [M50.30]  Myelopathy concurrent with and due to spinal stenosis of cervical region [M48.02, G99.2]  Status post incision and drainage [Z98.890]  Cervical myelopathy [G95.9]    Admission Date: 7/13/2022  Expected Discharge Date:     Discharge Barriers Identified: None    Payor: CIGNA / Plan: CIGNA OPEN ACCESS PLUS / Product Type: Commercial /     Extended Emergency Contact Information  Primary Emergency Contact: Chely Wilkins   [facility]  Home Phone: [phone]  Mobile Phone: [phone]  Relation: Mother    Discharge Plan A: Home  Discharge Plan B: Home      [id number] - [place] - [address] AT [facility] [place]  [address]  [place] [address]  Phone: [phone] Fax: [phone]      Initial Assessment (most recent)     Adult Discharge Assessment - 07/14/22 0927        Discharge Assessment    Assessment Type Discharge Planning Assessment     Confirmed/corrected address, phone number and insurance Yes     Confirmed Demographics Correct on Facesheet     Source of Information patient     When was your last doctors appointment? 06/07/22     Communicated JACE with patient/caregiver Yes     Reason For Admission Planned procedure     Lives With alone     Facility Arrived From: Home     Do you expect to return to your current living situation? Yes     Do you have help at home or someone to help you manage your care at home? No     Prior to hospitilization cognitive status: Alert/Oriented     Current cognitive status: Alert/Oriented     Walking or Climbing Stairs Difficulty none     Dressing/Bathing Difficulty none     Equipment Currently Used at Home none     Readmission within 30 days? No     Patient currently being followed by outpatient case management? No     Do you  currently have service(s) that help you manage your care at home? No     Do you take prescription medications? No     Do you have prescription coverage? Yes     Coverage CIGNA     Do you have any problems affording any of your prescribed medications? TBD     Who is going to help you get home at discharge? Patient is independent but does have family to assist if needed     How do you get to doctors appointments? car, drives self;family or friend will provide     Are you on dialysis? No     Do you take coumadin? No     Discharge Plan A Home     Discharge Plan B Home     DME Needed Upon Discharge  none     Discharge Plan discussed with: Patient     Discharge Barriers Identified None               CM met with patient at the bedside to assess for discharge needs.  Patient is independent and does not have any anticipated discharge needs at this time.  CM provided a transitional care folder, information on advanced directives, information on pharmacy bedside delivery, and discharge planning begins on admission with contact information for any needs/questions.

## 2022-07-14 NOTE — PLAN OF CARE
Young approved by Denise with [facility] and delivered by [place] to bedside     07/14/22 1154   Post-Acute Status   Post-Acute Authorization HME   HME Status Set-up Complete/Auth obtained

## 2022-07-14 NOTE — PLAN OF CARE
[facility]  Discharge Final Note    Primary Care Provider: Claus Goomdan MD    Expected Discharge Date: 7/14/2022    Final Discharge Note (most recent)     Final Note - 07/14/22 1301        Final Note    Assessment Type Final Discharge Note     Anticipated Discharge Disposition Home or Self Care     Hospital Resources/Appts/Education Provided Appointments scheduled and added to AVS                 Important Message from Medicare             Contact Info     Yash Fortune MD   Specialty: Neurosurgery    [address] [facility] [address]   Phone: [phone]       Next Steps: Follow up in 2 week(s)    Instructions: For Follow Up    [facility]   Specialty: DME Provider    [address] [place] [address]   Phone: [phone]       Next Steps: Follow up    Instructions: DME- cane        Jul28 Post OP 8:00 AM   Yahir Zapata PA-C  [facility]   [address] [place] [address] [address] [facility], 2nd Floor   [facility] [address] [phone]

## 2022-07-14 NOTE — DISCHARGE SUMMARY
"[facility]  Neurosurgery  Discharge Summary      Patient Name: Alphonse Claude Mackey III  MRN: [id number]  Admission Date: 7/13/2022  Hospital Length of Stay: 1 days  Discharge Date and Time:  07/14/2022 11:35 AM  Attending Physician: Yash Fortune MD   Discharging Provider: Yash Fortune MD  Primary Care Provider: Claus Goodman MD    HPI:   No notes on file    Procedure(s) (LRB):  DISCECTOMY, SPINE, CERVICAL, ANTERIOR APPROACH, WITH FUSION (Left)  DISSECTION, NECK (Left)     Hospital Course: No notes on file    Goals of Care Treatment Preferences:         Consults:     Significant Diagnostic Studies: none    Pending Diagnostic Studies:     None        Final Active Diagnoses:    Diagnosis Date Noted POA    PRINCIPAL PROBLEM:  Cervical pain [M54.2] 12/10/2021 Yes      Problems Resolved During this Admission:      Discharged Condition: good     Disposition: Home or Self Care    Follow Up:   Follow-up Information     Yash Fortune MD Follow up in 2 week(s).    Specialty: Neurosurgery  Why: For Follow Up  Contact information:  [address] [facility] [address] [phone]                       Patient Instructions:      CANE FOR HOME USE     Order Specific Question Answer Comments   Type of Cane: Straight    Height: 5' 10" (1.778 m)    Weight: 98.9 kg (218 lb 2.3 oz)    Does patient have medical equipment at home? none    Length of need (1-99 months): 3    Please check all that apply: Patient's condition impairs ambulation.    Please check all that apply: Patient is unable to safely ambulate without equipment.      Diet Adult Regular     Lifting restrictions   Order Comments: Avoid bending lifting or twisting   No lifting greater than 15 pounds   C collar on except for eating or showering     Notify your health care provider if you experience any of the following:  temperature >100.4     Notify your health care provider if you experience any of the following:  severe uncontrolled pain "     Notify your health care provider if you experience any of the following:  redness, tenderness, or signs of infection (pain, swelling, redness, odor or green/yellow discharge around incision site)     No dressing needed   Order Comments: Clean daily with soap and water     Medications:  Reconciled Home Medications:      Medication List      START taking these medications    HYDROcodone-acetaminophen  mg per tablet  Commonly known as: NORCO  Take 1 tablet by mouth every 6 (six) hours as needed (pain 6-7/10).     methocarbamoL 750 MG Tab  Commonly known as: ROBAXIN  Take 1 tablet (750 mg total) by mouth 3 (three) times daily as needed (muscle spasms).        CHANGE how you take these medications    busPIRone 10 MG tablet  Commonly known as: BUSPAR  Take 1 tablet (10 mg total) by mouth 2 (two) times daily.  What changed: when to take this        CONTINUE taking these medications    b complex vitamins tablet  Take 1 tablet by mouth once daily.     cholecalciferol (vitamin D3) 50 mcg (2,000 unit) Cap capsule  Commonly known as: VITAMIN D3  Take by mouth once daily.     diazePAM 5 MG tablet  Commonly known as: VALIUM  1 tab 30 minutes before MRI     fish oil-omega-3 fatty acids 300-1,000 mg capsule  Take by mouth once daily.     multivitamin capsule  Take 1 capsule by mouth once daily.     ONE-A-DAY MEN'S COMPLETE ORAL  Take by mouth.     propranoloL 20 MG tablet  Commonly known as: INDERAL  Take 1 tablet (20 mg total) by mouth 2 (two) times daily.            Yash Fortune MD  Neurosurgery  [facility]

## 2022-07-14 NOTE — PROGRESS NOTES
[facility]  Neurosurgery  Progress Note    Subjective:     POD 1  Doing great   Resting in bed   Mild discomfort swallowing   No really having any significant neck or UE pain     MAEW   Incision cdi   Drain removed   dermabond placed to incision   XR reviewed instrumentation in place without hardware issue     AP   D/C to home later this AM       Post-Op Info:  Procedure(s) (LRB):  DISCECTOMY, SPINE, CERVICAL, ANTERIOR APPROACH, WITH FUSION (Left)  DISSECTION, NECK (Left)   1 Day Post-Op      Medications:  Continuous Infusions:  Scheduled Meds:   bisacodyL  10 mg Rectal Daily    docusate sodium  100 mg Oral Daily    propranoloL  20 mg Oral BID     PRN Meds:acetaminophen, aluminum-magnesium hydroxide-simethicone, diazePAM, diphenhydrAMINE, HYDROcodone-acetaminophen, HYDROmorphone, HYDROmorphone, ondansetron, oxyCODONE-acetaminophen, prochlorperazine, senna-docusate 8.6-50 mg     Review of Systems  Objective:     Weight: 98.9 kg (218 lb 2.3 oz)  Body mass index is 31.3 kg/m².  Vital Signs (Most Recent):  Temp: 97.5 °F (36.4 °C) (07/14/22 0722)  Pulse: 60 (07/14/22 0722)  Resp: 18 (07/14/22 0722)  BP: (!) 141/85 (07/14/22 0722)  SpO2: 98 % (07/14/22 0722) Vital Signs (24h Range):  Temp:  [97.1 °F (36.2 °C)-99.6 °F (37.6 °C)] 97.5 °F (36.4 °C)  Pulse:  [] 60  Resp:  [12-32] 18  SpO2:  [95 %-100 %] 98 %  BP: (104-141)/(55-85) 141/85                Oxygen Concentration (%):  [98] 98         Closed/Suction Drain 07/13/22 1404 Left Neck Bulb 15 Fr. (Active)   Dressing Status Intact 07/14/22 0345   Drainage Serosanguineous 07/14/22 0345   Status To bulb suction 07/14/22 0345   Output (mL) 30 mL 07/14/22 0640       Neurosurgery Physical Exam    Significant Labs:  No results for input(s): GLU, NA, K, CL, CO2, BUN, CREATININE, CALCIUM, MG in the last 48 hours.  No results for input(s): WBC, HGB, HCT, PLT in the last 48 hours.  No results for input(s): LABPT, INR, APTT in the last 48 hours.  Microbiology Results  (last 7 days)     ** No results found for the last 168 hours. **        All pertinent labs from the last 24 hours have been reviewed.  Significant Diagnostics:  I have reviewed all pertinent imaging results/findings within the past 24 hours.    Assessment/Plan:     There are no hospital problems to display for this patient.      Yash Fortune MD  Neurosurgery  [facility]

## 2022-07-15 ENCOUNTER — PATIENT MESSAGE (OUTPATIENT)
Dept: NEUROSURGERY | Facility: CLINIC | Age: 32
End: 2022-07-15
Payer: COMMERCIAL

## 2022-07-17 ENCOUNTER — PATIENT MESSAGE (OUTPATIENT)
Dept: NEUROSURGERY | Facility: CLINIC | Age: 32
End: 2022-07-17
Payer: COMMERCIAL

## 2022-07-18 ENCOUNTER — PATIENT MESSAGE (OUTPATIENT)
Dept: NEUROSURGERY | Facility: CLINIC | Age: 32
End: 2022-07-18
Payer: COMMERCIAL

## 2022-07-19 ENCOUNTER — PATIENT MESSAGE (OUTPATIENT)
Dept: NEUROSURGERY | Facility: CLINIC | Age: 32
End: 2022-07-19
Payer: COMMERCIAL

## 2022-07-27 ENCOUNTER — TELEPHONE (OUTPATIENT)
Dept: NEUROSURGERY | Facility: CLINIC | Age: 32
End: 2022-07-27
Payer: COMMERCIAL

## 2022-07-27 NOTE — TELEPHONE ENCOUNTER
I spoke to the pt and informed him that the providers will be booked into sx on 7/28 therefore his appt was moved to 7/29 at the [place]..    The pt verbalized understanding

## 2022-07-29 ENCOUNTER — OFFICE VISIT (OUTPATIENT)
Dept: INTERNAL MEDICINE | Facility: CLINIC | Age: 32
End: 2022-07-29
Payer: COMMERCIAL

## 2022-07-29 ENCOUNTER — OFFICE VISIT (OUTPATIENT)
Dept: NEUROSURGERY | Facility: CLINIC | Age: 32
End: 2022-07-29
Payer: COMMERCIAL

## 2022-07-29 ENCOUNTER — HOSPITAL ENCOUNTER (OUTPATIENT)
Dept: RADIOLOGY | Facility: HOSPITAL | Age: 32
Discharge: HOME | End: 2022-07-29
Attending: PHYSICIAN ASSISTANT
Payer: COMMERCIAL

## 2022-07-29 VITALS
HEIGHT: 70 IN | DIASTOLIC BLOOD PRESSURE: 89 MMHG | RESPIRATION RATE: 18 BRPM | WEIGHT: 218.06 LBS | HEART RATE: 78 BPM | BODY MASS INDEX: 31.22 KG/M2 | SYSTOLIC BLOOD PRESSURE: 128 MMHG

## 2022-07-29 VITALS
BODY MASS INDEX: 30.75 KG/M2 | HEART RATE: 82 BPM | SYSTOLIC BLOOD PRESSURE: 126 MMHG | WEIGHT: 214.31 LBS | TEMPERATURE: 96 F | OXYGEN SATURATION: 99 % | DIASTOLIC BLOOD PRESSURE: 80 MMHG

## 2022-07-29 DIAGNOSIS — I10 PRIMARY HYPERTENSION: Primary | ICD-10-CM

## 2022-07-29 DIAGNOSIS — Z98.1 STATUS POST CERVICAL SPINAL FUSION: Primary | ICD-10-CM

## 2022-07-29 DIAGNOSIS — F41.9 ANXIETY: ICD-10-CM

## 2022-07-29 DIAGNOSIS — Z98.1 STATUS POST CERVICAL SPINAL FUSION: ICD-10-CM

## 2022-07-29 DIAGNOSIS — Z48.89 ENCOUNTER FOR POSTOPERATIVE WOUND CHECK: ICD-10-CM

## 2022-07-29 DIAGNOSIS — Z00.00 PREVENTATIVE HEALTH CARE: ICD-10-CM

## 2022-07-29 PROCEDURE — 72040 XR CERVICAL SPINE 2 OR 3 VIEWS: ICD-10-PCS | Mod: 26,,, | Performed by: RADIOLOGY

## 2022-07-29 PROCEDURE — 72040 X-RAY EXAM NECK SPINE 2-3 VW: CPT | Mod: TC

## 2022-07-29 PROCEDURE — 72040 X-RAY EXAM NECK SPINE 2-3 VW: CPT | Mod: 26,,, | Performed by: RADIOLOGY

## 2022-07-29 PROCEDURE — 3074F PR MOST RECENT SYSTOLIC BLOOD PRESSURE < 130 MM HG: ICD-10-PCS | Mod: CPTII,S$GLB,, | Performed by: INTERNAL MEDICINE

## 2022-07-29 PROCEDURE — 99024 PR POST-OP FOLLOW-UP VISIT: ICD-10-PCS | Mod: S$GLB,,, | Performed by: PHYSICIAN ASSISTANT

## 2022-07-29 PROCEDURE — 99999 PR PBB SHADOW E&M-EST. PATIENT-LVL IV: CPT | Mod: PBBFAC,,, | Performed by: PHYSICIAN ASSISTANT

## 2022-07-29 PROCEDURE — 3074F SYST BP LT 130 MM HG: CPT | Mod: CPTII,S$GLB,, | Performed by: PHYSICIAN ASSISTANT

## 2022-07-29 PROCEDURE — 3074F PR MOST RECENT SYSTOLIC BLOOD PRESSURE < 130 MM HG: ICD-10-PCS | Mod: CPTII,S$GLB,, | Performed by: PHYSICIAN ASSISTANT

## 2022-07-29 PROCEDURE — 99024 POSTOP FOLLOW-UP VISIT: CPT | Mod: S$GLB,,, | Performed by: PHYSICIAN ASSISTANT

## 2022-07-29 PROCEDURE — 3008F BODY MASS INDEX DOCD: CPT | Mod: CPTII,S$GLB,, | Performed by: INTERNAL MEDICINE

## 2022-07-29 PROCEDURE — 1159F PR MEDICATION LIST DOCUMENTED IN MEDICAL RECORD: ICD-10-PCS | Mod: CPTII,S$GLB,, | Performed by: INTERNAL MEDICINE

## 2022-07-29 PROCEDURE — 3079F PR MOST RECENT DIASTOLIC BLOOD PRESSURE 80-89 MM HG: ICD-10-PCS | Mod: CPTII,S$GLB,, | Performed by: PHYSICIAN ASSISTANT

## 2022-07-29 PROCEDURE — 1159F MED LIST DOCD IN RCRD: CPT | Mod: CPTII,S$GLB,, | Performed by: INTERNAL MEDICINE

## 2022-07-29 PROCEDURE — 1111F PR DISCHARGE MEDS RECONCILED W/ CURRENT OUTPATIENT MED LIST: ICD-10-PCS | Mod: CPTII,S$GLB,, | Performed by: INTERNAL MEDICINE

## 2022-07-29 PROCEDURE — 3008F PR BODY MASS INDEX (BMI) DOCUMENTED: ICD-10-PCS | Mod: CPTII,S$GLB,, | Performed by: PHYSICIAN ASSISTANT

## 2022-07-29 PROCEDURE — 1111F DSCHRG MED/CURRENT MED MERGE: CPT | Mod: CPTII,S$GLB,, | Performed by: INTERNAL MEDICINE

## 2022-07-29 PROCEDURE — 99999 PR PBB SHADOW E&M-EST. PATIENT-LVL IV: ICD-10-PCS | Mod: PBBFAC,,, | Performed by: INTERNAL MEDICINE

## 2022-07-29 PROCEDURE — 99214 PR OFFICE/OUTPT VISIT, EST, LEVL IV, 30-39 MIN: ICD-10-PCS | Mod: S$GLB,,, | Performed by: INTERNAL MEDICINE

## 2022-07-29 PROCEDURE — 99214 OFFICE O/P EST MOD 30 MIN: CPT | Mod: S$GLB,,, | Performed by: INTERNAL MEDICINE

## 2022-07-29 PROCEDURE — 3079F DIAST BP 80-89 MM HG: CPT | Mod: CPTII,S$GLB,, | Performed by: PHYSICIAN ASSISTANT

## 2022-07-29 PROCEDURE — 3008F BODY MASS INDEX DOCD: CPT | Mod: CPTII,S$GLB,, | Performed by: PHYSICIAN ASSISTANT

## 2022-07-29 PROCEDURE — 99999 PR PBB SHADOW E&M-EST. PATIENT-LVL IV: CPT | Mod: PBBFAC,,, | Performed by: INTERNAL MEDICINE

## 2022-07-29 PROCEDURE — 3079F DIAST BP 80-89 MM HG: CPT | Mod: CPTII,S$GLB,, | Performed by: INTERNAL MEDICINE

## 2022-07-29 PROCEDURE — 3079F PR MOST RECENT DIASTOLIC BLOOD PRESSURE 80-89 MM HG: ICD-10-PCS | Mod: CPTII,S$GLB,, | Performed by: INTERNAL MEDICINE

## 2022-07-29 PROCEDURE — 3008F PR BODY MASS INDEX (BMI) DOCUMENTED: ICD-10-PCS | Mod: CPTII,S$GLB,, | Performed by: INTERNAL MEDICINE

## 2022-07-29 PROCEDURE — 3074F SYST BP LT 130 MM HG: CPT | Mod: CPTII,S$GLB,, | Performed by: INTERNAL MEDICINE

## 2022-07-29 PROCEDURE — 99999 PR PBB SHADOW E&M-EST. PATIENT-LVL IV: ICD-10-PCS | Mod: PBBFAC,,, | Performed by: PHYSICIAN ASSISTANT

## 2022-07-29 RX ORDER — BUSPIRONE HYDROCHLORIDE 10 MG/1
10 TABLET ORAL DAILY
Qty: 90 TABLET | Refills: 1 | Status: SHIPPED | OUTPATIENT
Start: 2022-07-29 | End: 2022-10-31 | Stop reason: SDUPTHER

## 2022-07-29 NOTE — PROGRESS NOTES
Subjective:      Patient ID: Alphonse Claude Mackey III is a 32 y.o. male.    Chief Complaint: Follow-up    HPI     31 yo with   Patient Active Problem List   Diagnosis    Panic disorder without agoraphobia with mild panic attacks    Prolapsed internal hemorrhoids, grade 2    Cervical pain    Primary hypertension    Tachycardia, paroxysmal    Other chest pain    EKG abnormalities    Imbalance    Demyelination, corpus callosum central    Persistent postural-perceptual dizziness    Claustrophobia    Cervical radiculopathy    Degenerative disc disease, cervical     Here today for management of mult med problems as outlined below.  Compliant with meds without significant side effects. Energy and appetite good.     Home bp 120s to 130s/ 80s  Taking propranolol daily. Feels bp same on bid or qd and would prefer to take qd.     Feels anxiety doing well.     Review of Systems   Constitutional: Negative for chills and fever.   HENT: Negative for ear pain and sore throat.    Respiratory: Negative for cough.    Cardiovascular: Negative for chest pain.   Gastrointestinal: Negative for abdominal pain and blood in stool.   Genitourinary: Negative for dysuria and hematuria.   Neurological: Negative for seizures and syncope.     Objective:   /80 (BP Location: Left arm, Patient Position: Sitting, BP Method: Large (Manual))   Pulse 82   Temp 96.4 °F (35.8 °C) (Tympanic)   Wt 97.2 kg (214 lb 4.6 oz)   SpO2 99%   BMI 30.75 kg/m²     Physical Exam  Constitutional:       General: He is awake.      Appearance: Normal appearance.   HENT:      Head: Normocephalic and atraumatic.   Eyes:      Conjunctiva/sclera: Conjunctivae normal.   Pulmonary:      Effort: Pulmonary effort is normal.   Neurological:      Mental Status: He is alert. Mental status is at baseline.   Psychiatric:         Mood and Affect: Mood normal.         Behavior: Behavior normal. Behavior is cooperative.         Thought Content: Thought content  normal.         Judgment: Judgment normal.         Assessment:     1. Primary hypertension    2. Anxiety    3. Preventative health care      Plan:   Primary hypertension  Controlled    Anxiety  stable  -     busPIRone (BUSPAR) 10 MG tablet; Take 1 tablet (10 mg total) by mouth once daily.  Dispense: 90 tablet; Refill: 1    Preventative health care  -     CBC Auto Differential; Future; Expected date: 01/25/2023  -     Comprehensive Metabolic Panel; Future; Expected date: 01/25/2023  -     Lipid Panel; Future; Expected date: 01/25/2023  -     TSH; Future; Expected date: 01/25/2023        Lab Frequency Next Occurrence   Ambulatory referral/consult to Physical/Occupational Therapy Once 12/06/2021   Ambulatory referral/consult to Psychiatry Once 01/11/2022   X-Ray Cervical Spine 2 or 3 Views Once 08/29/2022       Problem List Items Addressed This Visit     Primary hypertension - Primary      Other Visit Diagnoses     Anxiety        Relevant Medications    busPIRone (BUSPAR) 10 MG tablet    Preventative health care        Relevant Orders    CBC Auto Differential    Comprehensive Metabolic Panel    Lipid Panel    TSH          No follow-ups on file.

## 2022-08-11 ENCOUNTER — PATIENT MESSAGE (OUTPATIENT)
Dept: NEUROSURGERY | Facility: CLINIC | Age: 32
End: 2022-08-11
Payer: COMMERCIAL

## 2022-08-11 DIAGNOSIS — Z98.1 STATUS POST CERVICAL SPINAL FUSION: Primary | ICD-10-CM

## 2022-08-11 DIAGNOSIS — R42 DIZZINESS: ICD-10-CM

## 2022-08-15 NOTE — PROGRESS NOTES
Subjective:      Patient ID: Alphonse Claude Mackey III is a 32 y.o. male.    Chief Complaint: Post-op Evaluation (The pt reports today for Po#1 ACDF 7/13. The pt rated his pain 1/10 today and denies having any recent falls. The pt stated he stopped taking the pain medication and does not require a refill.)    HPI  The patient is here today for postop evaluation #1.  He reports that his dizziness is stable.   Denies HA, vision changes, shortness of breath and chest pain.   No neck or arm pain.    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  Cervical corpectomy diskectomy decompression and bilateral foraminotomy C4 with removal of 100% of the vertebral body  2.  Cervical arthrodesis C3-5  3. Cervical discectomy and foraminotomy bilateral C3-4,4-5   5.  Placement of structural  interbody cage C3-5  7.  Use of allograft bone   8.  Use of autograft bone   9.  Placement of anterior cervical plate C3-5  10.  Use of microscope for dissection during corpectomy  11.  Use of intraoperative neuro monitoring         Objective:            General    Constitutional: He is oriented to person, place, and time. He appears well-developed and well-nourished.   Cardiovascular: Normal rate and regular rhythm.    Pulmonary/Chest: Effort normal.   Abdominal: Soft.   Neurological: He is alert and oriented to person, place, and time.   Psychiatric: He has a normal mood and affect. His behavior is normal.             Neurosurgery exam:  Vitals reviewed  VENKATA  Incision healing well -CDI  Steri-strips placed to reinforce drain site  Trachea midline                  Assessment:       Encounter Diagnoses   Name Primary?    Status post cervical spinal fusion Yes     Encounter for postoperative wound check           Plan:       Raj was seen today for post-op evaluation.    Diagnoses and all orders for this visit:    Status post cervical spinal fusion  -     Back/Cervical Brace For Home Use  -     X-Ray Cervical Spine 2 or 3 Views; Future  -     X-Ray Cervical Spine 2 or 3 Views; Future    Encounter for postoperative wound check  -     Back/Cervical Brace For Home Use  -     X-Ray Cervical Spine 2 or 3 Views; Future  -     X-Ray Cervical Spine 2 or 3 Views; Future      Continue Lisle and/or soft collar. Soft collar ordered today.  Patient will complete x-rays on his way out and again in 4 weeks for postop eval 2.  Please call with any changes.            Yahir Zapata PA-C

## 2022-08-21 ENCOUNTER — PATIENT MESSAGE (OUTPATIENT)
Dept: NEUROSURGERY | Facility: CLINIC | Age: 32
End: 2022-08-21
Payer: COMMERCIAL

## 2022-08-23 ENCOUNTER — PATIENT MESSAGE (OUTPATIENT)
Dept: NEUROSURGERY | Facility: CLINIC | Age: 32
End: 2022-08-23
Payer: COMMERCIAL

## 2022-08-26 ENCOUNTER — HOSPITAL ENCOUNTER (OUTPATIENT)
Dept: RADIOLOGY | Facility: HOSPITAL | Age: 32
Discharge: HOME | End: 2022-08-26
Attending: PHYSICIAN ASSISTANT
Payer: COMMERCIAL

## 2022-08-26 ENCOUNTER — OFFICE VISIT (OUTPATIENT)
Dept: NEUROSURGERY | Facility: CLINIC | Age: 32
End: 2022-08-26
Payer: COMMERCIAL

## 2022-08-26 VITALS
DIASTOLIC BLOOD PRESSURE: 94 MMHG | WEIGHT: 213.38 LBS | HEART RATE: 57 BPM | HEIGHT: 70 IN | SYSTOLIC BLOOD PRESSURE: 134 MMHG | BODY MASS INDEX: 30.55 KG/M2 | RESPIRATION RATE: 18 BRPM

## 2022-08-26 DIAGNOSIS — R42 DIZZINESS: ICD-10-CM

## 2022-08-26 DIAGNOSIS — Z48.89 ENCOUNTER FOR POSTOPERATIVE WOUND CHECK: ICD-10-CM

## 2022-08-26 DIAGNOSIS — Z98.1 STATUS POST CERVICAL SPINAL FUSION: Primary | ICD-10-CM

## 2022-08-26 DIAGNOSIS — M50.021 CERVICAL DISC DISORDER AT C4-C5 LEVEL WITH MYELOPATHY: ICD-10-CM

## 2022-08-26 DIAGNOSIS — Z98.1 STATUS POST CERVICAL SPINAL FUSION: ICD-10-CM

## 2022-08-26 PROCEDURE — 4010F PR ACE/ARB THEARPY RXD/TAKEN: ICD-10-PCS | Mod: CPTII,S$GLB,, | Performed by: NEUROLOGICAL SURGERY

## 2022-08-26 PROCEDURE — 99024 POSTOP FOLLOW-UP VISIT: CPT | Mod: S$GLB,,, | Performed by: NEUROLOGICAL SURGERY

## 2022-08-26 PROCEDURE — 3080F DIAST BP >= 90 MM HG: CPT | Mod: CPTII,S$GLB,, | Performed by: NEUROLOGICAL SURGERY

## 2022-08-26 PROCEDURE — 3008F PR BODY MASS INDEX (BMI) DOCUMENTED: ICD-10-PCS | Mod: CPTII,S$GLB,, | Performed by: NEUROLOGICAL SURGERY

## 2022-08-26 PROCEDURE — 99999 PR PBB SHADOW E&M-EST. PATIENT-LVL III: CPT | Mod: PBBFAC,,, | Performed by: NEUROLOGICAL SURGERY

## 2022-08-26 PROCEDURE — 99024 PR POST-OP FOLLOW-UP VISIT: ICD-10-PCS | Mod: S$GLB,,, | Performed by: NEUROLOGICAL SURGERY

## 2022-08-26 PROCEDURE — 4010F ACE/ARB THERAPY RXD/TAKEN: CPT | Mod: CPTII,S$GLB,, | Performed by: NEUROLOGICAL SURGERY

## 2022-08-26 PROCEDURE — 99999 PR PBB SHADOW E&M-EST. PATIENT-LVL III: ICD-10-PCS | Mod: PBBFAC,,, | Performed by: NEUROLOGICAL SURGERY

## 2022-08-26 PROCEDURE — 72040 X-RAY EXAM NECK SPINE 2-3 VW: CPT | Mod: 26,,, | Performed by: RADIOLOGY

## 2022-08-26 PROCEDURE — 3080F PR MOST RECENT DIASTOLIC BLOOD PRESSURE >= 90 MM HG: ICD-10-PCS | Mod: CPTII,S$GLB,, | Performed by: NEUROLOGICAL SURGERY

## 2022-08-26 PROCEDURE — 72040 XR CERVICAL SPINE 2 OR 3 VIEWS: ICD-10-PCS | Mod: 26,,, | Performed by: RADIOLOGY

## 2022-08-26 PROCEDURE — 3008F BODY MASS INDEX DOCD: CPT | Mod: CPTII,S$GLB,, | Performed by: NEUROLOGICAL SURGERY

## 2022-08-26 PROCEDURE — 3075F SYST BP GE 130 - 139MM HG: CPT | Mod: CPTII,S$GLB,, | Performed by: NEUROLOGICAL SURGERY

## 2022-08-26 PROCEDURE — 3075F PR MOST RECENT SYSTOLIC BLOOD PRESS GE 130-139MM HG: ICD-10-PCS | Mod: CPTII,S$GLB,, | Performed by: NEUROLOGICAL SURGERY

## 2022-08-26 PROCEDURE — 72040 X-RAY EXAM NECK SPINE 2-3 VW: CPT | Mod: TC

## 2022-08-26 RX ORDER — AMOXICILLIN 500 MG/1
CAPSULE ORAL
COMMUNITY
Start: 2022-08-25 | End: 2022-09-20

## 2022-08-26 NOTE — LETTER
August 26, 2022      [facility]  [address] [facility]  [place] [address]  Phone: [phone]  Fax: [phone]       Patient: Raj Willard   YOB: 1990  Date of Visit: 08/26/2022    To Whom It May Concern:    Vasquez Willard  was at [facility] on 08/26/2022. The patient may return to work on 09/01/2022 with restrictions, patient can not lift anything over 20 lbs and must wear his cervical collar at all time. If you have any questions or concerns, or if I can be of further assistance, please do not hesitate to contact me.    Sincerely,    Yash Sepulveda MD

## 2022-08-26 NOTE — PROGRESS NOTES
Subjective:      Patient ID: Alphonse Claude Mackey III is a 32 y.o. male.    Chief Complaint: Neck Pain (X-ray f/u)    HPI  Postop 2. 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  Cervical corpectomy diskectomy decompression and bilateral foraminotomy C4 with removal of 100% of the vertebral body  2.  Cervical arthrodesis C3-5  3. Cervical discectomy and foraminotomy bilateral C3-4,4-5   5.  Placement of structural  interbody cage C3-5  7.  Use of allograft bone   8.  Use of autograft bone   9.  Placement of anterior cervical plate C3-5  10.  Use of microscope for dissection during corpectomy  11.  Use of intraoperative neuro monitoring         Objective:            General    Constitutional: He is oriented to person, place, and time. He appears well-developed and well-nourished.   Cardiovascular:  Normal rate and regular rhythm.            Pulmonary/Chest: Effort normal.   Abdominal: Soft.   Neurological: He is alert and oriented to person, place, and time.   Psychiatric: He has a normal mood and affect. His behavior is normal.           Neurosurgery exam:  Vitals reviewed  MAEW  Incision healing well -CDI  Steri-strips placed to reinforce drain site  Trachea midline                  Assessment:       Encounter Diagnoses   Name Primary?    Status post cervical spinal fusion Yes    Dizziness     Cervical disc disorder at C4-C5 level with myelopathy             Plan:       Raj was seen today for neck pain.    Diagnoses and all orders for this visit:    Status post cervical spinal  fusion    Dizziness    Cervical disc disorder at C4-C5 level with myelopathy         continue brace when out of bed  X-ray remained stable  PT outpatient  RTC with CT scan cervical spine      Thank you for the referral   Please call with any questions    Yash Fortune MD  Neurosurgery     Disclaimer: This note was prepared using a voice recognition system and is likely to have sound alike errors within the text.

## 2022-09-10 ENCOUNTER — PATIENT MESSAGE (OUTPATIENT)
Dept: NEUROSURGERY | Facility: CLINIC | Age: 32
End: 2022-09-10
Payer: COMMERCIAL

## 2022-09-16 ENCOUNTER — PATIENT MESSAGE (OUTPATIENT)
Dept: INTERNAL MEDICINE | Facility: CLINIC | Age: 32
End: 2022-09-16
Payer: COMMERCIAL

## 2022-09-20 ENCOUNTER — PATIENT MESSAGE (OUTPATIENT)
Dept: INTERNAL MEDICINE | Facility: CLINIC | Age: 32
End: 2022-09-20
Payer: COMMERCIAL

## 2022-09-20 DIAGNOSIS — I47.9 TACHYCARDIA, PAROXYSMAL: ICD-10-CM

## 2022-09-20 DIAGNOSIS — Z00.00 PREVENTATIVE HEALTH CARE: Primary | ICD-10-CM

## 2022-09-20 RX ORDER — METOPROLOL SUCCINATE 25 MG/1
25 TABLET, EXTENDED RELEASE ORAL DAILY
Qty: 90 TABLET | Refills: 1 | Status: SHIPPED | OUTPATIENT
Start: 2022-09-20 | End: 2022-10-31

## 2022-09-22 ENCOUNTER — OFFICE VISIT (OUTPATIENT)
Dept: INTERNAL MEDICINE | Facility: CLINIC | Age: 32
End: 2022-09-22
Payer: COMMERCIAL

## 2022-09-22 VITALS
DIASTOLIC BLOOD PRESSURE: 92 MMHG | HEIGHT: 70 IN | SYSTOLIC BLOOD PRESSURE: 138 MMHG | HEART RATE: 97 BPM | WEIGHT: 203.69 LBS | BODY MASS INDEX: 29.16 KG/M2 | TEMPERATURE: 98 F | OXYGEN SATURATION: 97 %

## 2022-09-22 DIAGNOSIS — I10 PRIMARY HYPERTENSION: Primary | ICD-10-CM

## 2022-09-22 DIAGNOSIS — F41.9 ANXIETY: ICD-10-CM

## 2022-09-22 PROCEDURE — 99999 PR PBB SHADOW E&M-EST. PATIENT-LVL V: CPT | Mod: PBBFAC,,, | Performed by: INTERNAL MEDICINE

## 2022-09-22 PROCEDURE — 99214 PR OFFICE/OUTPT VISIT, EST, LEVL IV, 30-39 MIN: ICD-10-PCS | Mod: S$GLB,,, | Performed by: INTERNAL MEDICINE

## 2022-09-22 PROCEDURE — 3080F DIAST BP >= 90 MM HG: CPT | Mod: CPTII,S$GLB,, | Performed by: INTERNAL MEDICINE

## 2022-09-22 PROCEDURE — 4010F ACE/ARB THERAPY RXD/TAKEN: CPT | Mod: CPTII,S$GLB,, | Performed by: INTERNAL MEDICINE

## 2022-09-22 PROCEDURE — 3080F PR MOST RECENT DIASTOLIC BLOOD PRESSURE >= 90 MM HG: ICD-10-PCS | Mod: CPTII,S$GLB,, | Performed by: INTERNAL MEDICINE

## 2022-09-22 PROCEDURE — 99214 OFFICE O/P EST MOD 30 MIN: CPT | Mod: S$GLB,,, | Performed by: INTERNAL MEDICINE

## 2022-09-22 PROCEDURE — 1159F MED LIST DOCD IN RCRD: CPT | Mod: CPTII,S$GLB,, | Performed by: INTERNAL MEDICINE

## 2022-09-22 PROCEDURE — 3008F PR BODY MASS INDEX (BMI) DOCUMENTED: ICD-10-PCS | Mod: CPTII,S$GLB,, | Performed by: INTERNAL MEDICINE

## 2022-09-22 PROCEDURE — 3008F BODY MASS INDEX DOCD: CPT | Mod: CPTII,S$GLB,, | Performed by: INTERNAL MEDICINE

## 2022-09-22 PROCEDURE — 3075F PR MOST RECENT SYSTOLIC BLOOD PRESS GE 130-139MM HG: ICD-10-PCS | Mod: CPTII,S$GLB,, | Performed by: INTERNAL MEDICINE

## 2022-09-22 PROCEDURE — 99999 PR PBB SHADOW E&M-EST. PATIENT-LVL V: ICD-10-PCS | Mod: PBBFAC,,, | Performed by: INTERNAL MEDICINE

## 2022-09-22 PROCEDURE — 1159F PR MEDICATION LIST DOCUMENTED IN MEDICAL RECORD: ICD-10-PCS | Mod: CPTII,S$GLB,, | Performed by: INTERNAL MEDICINE

## 2022-09-22 PROCEDURE — 4010F PR ACE/ARB THEARPY RXD/TAKEN: ICD-10-PCS | Mod: CPTII,S$GLB,, | Performed by: INTERNAL MEDICINE

## 2022-09-22 PROCEDURE — 3075F SYST BP GE 130 - 139MM HG: CPT | Mod: CPTII,S$GLB,, | Performed by: INTERNAL MEDICINE

## 2022-09-22 RX ORDER — VALSARTAN 40 MG/1
40 TABLET ORAL DAILY
Qty: 90 TABLET | Refills: 0 | Status: SHIPPED | OUTPATIENT
Start: 2022-09-22 | End: 2022-10-31 | Stop reason: SDUPTHER

## 2022-09-22 NOTE — PROGRESS NOTES
"Subjective:      Patient ID: Alphonse Claude Mackey III is a 32 y.o. male.    Chief Complaint: Follow-up    HPI    31 yo with   Patient Active Problem List   Diagnosis    Panic disorder without agoraphobia with mild panic attacks    Prolapsed internal hemorrhoids, grade 2    Cervical pain    Primary hypertension    Tachycardia, paroxysmal    Other chest pain    EKG abnormalities    Imbalance    Demyelination, corpus callosum central    Persistent postural-perceptual dizziness    Claustrophobia    Cervical radiculopathy    Degenerative disc disease, cervical     Past Medical History:   Diagnosis Date    Anxiety     Cervical radiculopathy 7/14/2022    Primary hypertension 1/6/2022     Here today for management of mult med problems as outlined below.  Compliant with meds without significant side effects. Energy and appetite good.     Home bp as high 141/103     Pulse 60s when on propranolol.    Recently changed propranolol to metoprolol hoping to have less Side effects.     Anxiety- continues with symptoms, dealing with previous life experiences, worries about health, counseling is helping.       Review of Systems   Constitutional:  Negative for chills and fever.   HENT:  Negative for ear pain and sore throat.    Respiratory:  Negative for cough.    Gastrointestinal:  Negative for abdominal pain and blood in stool.   Genitourinary:  Negative for dysuria and hematuria.   Neurological:  Negative for seizures and syncope.   Objective:   BP (!) 138/92   Pulse 97   Temp 97.8 °F (36.6 °C) (Tympanic)   Ht 5' 10" (1.778 m)   Wt 92.4 kg (203 lb 11.3 oz)   SpO2 97%   BMI 29.23 kg/m²     Physical Exam  Constitutional:       General: He is not in acute distress.     Appearance: He is well-developed.   Cardiovascular:      Rate and Rhythm: Normal rate.   Pulmonary:      Effort: Pulmonary effort is normal.      Breath sounds: Normal breath sounds.   Skin:     General: Skin is warm and dry.   Psychiatric:         Behavior: " Behavior normal.       Assessment:     1. Primary hypertension    2. Anxiety      Plan:   Primary hypertension  Not at goal. Add diovan  -     valsartan (DIOVAN) 40 MG tablet; Take 1 tablet (40 mg total) by mouth once daily.  Dispense: 90 tablet; Refill: 0    Anxiety  Improved, cont counseling and buspar.     Lab Frequency Next Occurrence   Ambulatory referral/consult to Physical/Occupational Therapy Once 12/06/2021   Ambulatory referral/consult to Psychiatry Once 01/11/2022   CBC Auto Differential Once 01/25/2023   Comprehensive Metabolic Panel Once 01/25/2023   Lipid Panel Once 01/25/2023   TSH Once 01/25/2023   CT Cervical Spine Without Contrast Once 08/17/2022       Problem List Items Addressed This Visit       Panic disorder without agoraphobia with mild panic attacks    Primary hypertension - Primary    Relevant Medications    valsartan (DIOVAN) 40 MG tablet       Follow up in about 4 weeks (around 10/20/2022), or if symptoms worsen or fail to improve.

## 2022-09-27 ENCOUNTER — PATIENT MESSAGE (OUTPATIENT)
Dept: NEUROSURGERY | Facility: CLINIC | Age: 32
End: 2022-09-27
Payer: COMMERCIAL

## 2022-09-29 ENCOUNTER — TELEPHONE (OUTPATIENT)
Dept: NEUROSURGERY | Facility: CLINIC | Age: 32
End: 2022-09-29
Payer: COMMERCIAL

## 2022-09-29 ENCOUNTER — HOSPITAL ENCOUNTER (OUTPATIENT)
Dept: RADIOLOGY | Facility: HOSPITAL | Age: 32
Discharge: HOME | End: 2022-09-29
Attending: PHYSICIAN ASSISTANT
Payer: COMMERCIAL

## 2022-09-29 DIAGNOSIS — Z98.1 STATUS POST CERVICAL SPINAL FUSION: ICD-10-CM

## 2022-09-29 DIAGNOSIS — R42 DIZZINESS: ICD-10-CM

## 2022-09-29 PROCEDURE — 72125 CT NECK SPINE W/O DYE: CPT | Mod: 26,,, | Performed by: RADIOLOGY

## 2022-09-29 PROCEDURE — 72125 CT NECK SPINE W/O DYE: CPT | Mod: TC,PO

## 2022-09-29 PROCEDURE — 72125 CT CERVICAL SPINE WITHOUT CONTRAST: ICD-10-PCS | Mod: 26,,, | Performed by: RADIOLOGY

## 2022-10-01 ENCOUNTER — PATIENT MESSAGE (OUTPATIENT)
Dept: NEUROSURGERY | Facility: CLINIC | Age: 32
End: 2022-10-01
Payer: COMMERCIAL

## 2022-10-25 ENCOUNTER — OFFICE VISIT (OUTPATIENT)
Dept: NEUROSURGERY | Facility: CLINIC | Age: 32
End: 2022-10-25
Payer: COMMERCIAL

## 2022-10-25 ENCOUNTER — OFFICE VISIT (OUTPATIENT)
Dept: NEUROLOGY | Facility: CLINIC | Age: 32
End: 2022-10-25
Payer: COMMERCIAL

## 2022-10-25 VITALS
WEIGHT: 203 LBS | DIASTOLIC BLOOD PRESSURE: 94 MMHG | HEART RATE: 95 BPM | RESPIRATION RATE: 18 BRPM | HEIGHT: 70 IN | BODY MASS INDEX: 29.06 KG/M2 | SYSTOLIC BLOOD PRESSURE: 137 MMHG

## 2022-10-25 DIAGNOSIS — Z98.1 STATUS POST CERVICAL SPINAL FUSION: Primary | ICD-10-CM

## 2022-10-25 DIAGNOSIS — H81.8X9 PERSISTENT POSTURAL-PERCEPTUAL DIZZINESS: ICD-10-CM

## 2022-10-25 DIAGNOSIS — R42 DIZZINESS AND GIDDINESS: ICD-10-CM

## 2022-10-25 DIAGNOSIS — R90.82 CORPUS CALLOSUM WHITE MATTER ABNORMALITIES PRESENT ON MRI: ICD-10-CM

## 2022-10-25 DIAGNOSIS — G37.1: Primary | ICD-10-CM

## 2022-10-25 DIAGNOSIS — M50.30 DEGENERATIVE DISC DISEASE, CERVICAL: ICD-10-CM

## 2022-10-25 DIAGNOSIS — M50.021 CERVICAL DISC DISORDER AT C4-C5 LEVEL WITH MYELOPATHY: ICD-10-CM

## 2022-10-25 DIAGNOSIS — R26.89 IMBALANCE: ICD-10-CM

## 2022-10-25 DIAGNOSIS — Z48.89 ENCOUNTER FOR POSTOPERATIVE WOUND CHECK: ICD-10-CM

## 2022-10-25 DIAGNOSIS — F41.0 PANIC DISORDER WITHOUT AGORAPHOBIA WITH MILD PANIC ATTACKS: ICD-10-CM

## 2022-10-25 DIAGNOSIS — R42 DIZZINESS: ICD-10-CM

## 2022-10-25 PROCEDURE — 1159F PR MEDICATION LIST DOCUMENTED IN MEDICAL RECORD: ICD-10-PCS | Mod: CPTII,95,, | Performed by: NURSE PRACTITIONER

## 2022-10-25 PROCEDURE — 1159F MED LIST DOCD IN RCRD: CPT | Mod: CPTII,95,, | Performed by: NURSE PRACTITIONER

## 2022-10-25 PROCEDURE — 99213 OFFICE O/P EST LOW 20 MIN: CPT | Mod: S$GLB,,, | Performed by: PHYSICIAN ASSISTANT

## 2022-10-25 PROCEDURE — 3008F BODY MASS INDEX DOCD: CPT | Mod: CPTII,S$GLB,, | Performed by: PHYSICIAN ASSISTANT

## 2022-10-25 PROCEDURE — 99215 PR OFFICE/OUTPT VISIT, EST, LEVL V, 40-54 MIN: ICD-10-PCS | Mod: 95,,, | Performed by: NURSE PRACTITIONER

## 2022-10-25 PROCEDURE — 3075F SYST BP GE 130 - 139MM HG: CPT | Mod: CPTII,S$GLB,, | Performed by: PHYSICIAN ASSISTANT

## 2022-10-25 PROCEDURE — 99999 PR PBB SHADOW E&M-EST. PATIENT-LVL IV: CPT | Mod: PBBFAC,,, | Performed by: PHYSICIAN ASSISTANT

## 2022-10-25 PROCEDURE — 4010F PR ACE/ARB THEARPY RXD/TAKEN: ICD-10-PCS | Mod: CPTII,95,, | Performed by: NURSE PRACTITIONER

## 2022-10-25 PROCEDURE — 99999 PR PBB SHADOW E&M-EST. PATIENT-LVL IV: ICD-10-PCS | Mod: PBBFAC,,, | Performed by: PHYSICIAN ASSISTANT

## 2022-10-25 PROCEDURE — 3080F DIAST BP >= 90 MM HG: CPT | Mod: CPTII,S$GLB,, | Performed by: PHYSICIAN ASSISTANT

## 2022-10-25 PROCEDURE — 3008F PR BODY MASS INDEX (BMI) DOCUMENTED: ICD-10-PCS | Mod: CPTII,S$GLB,, | Performed by: PHYSICIAN ASSISTANT

## 2022-10-25 PROCEDURE — 4010F ACE/ARB THERAPY RXD/TAKEN: CPT | Mod: CPTII,95,, | Performed by: NURSE PRACTITIONER

## 2022-10-25 PROCEDURE — 4010F ACE/ARB THERAPY RXD/TAKEN: CPT | Mod: CPTII,S$GLB,, | Performed by: PHYSICIAN ASSISTANT

## 2022-10-25 PROCEDURE — 3075F PR MOST RECENT SYSTOLIC BLOOD PRESS GE 130-139MM HG: ICD-10-PCS | Mod: CPTII,S$GLB,, | Performed by: PHYSICIAN ASSISTANT

## 2022-10-25 PROCEDURE — 3080F PR MOST RECENT DIASTOLIC BLOOD PRESSURE >= 90 MM HG: ICD-10-PCS | Mod: CPTII,S$GLB,, | Performed by: PHYSICIAN ASSISTANT

## 2022-10-25 PROCEDURE — 99213 PR OFFICE/OUTPT VISIT, EST, LEVL III, 20-29 MIN: ICD-10-PCS | Mod: S$GLB,,, | Performed by: PHYSICIAN ASSISTANT

## 2022-10-25 PROCEDURE — 99215 OFFICE O/P EST HI 40 MIN: CPT | Mod: 95,,, | Performed by: NURSE PRACTITIONER

## 2022-10-25 PROCEDURE — 4010F PR ACE/ARB THEARPY RXD/TAKEN: ICD-10-PCS | Mod: CPTII,S$GLB,, | Performed by: PHYSICIAN ASSISTANT

## 2022-10-25 RX ORDER — DIAZEPAM 5 MG/1
5 TABLET ORAL ONCE AS NEEDED
Qty: 1 TABLET | Refills: 0 | Status: SHIPPED | OUTPATIENT
Start: 2022-10-25 | End: 2023-01-27

## 2022-10-25 NOTE — PROGRESS NOTES
"Subjective:      Patient ID: Alphonse Claude Mackey III is a 32 y.o. male.    HPI:  Results (Pt is here today to discuss ct results with Yahir Zapata PA-C pt is doing well 0/10 pain. )    The patient is here today for CT follow-up.    Patient reports that since surgery he continues to have dizziness.  He reports "ringing in both ears" for awhile with a few instances with pressure sensation in both ears.   States he will have some pain in both ears and then it quickly goes away.  Whenever he is dizzy, he notices that with lying down his body feels like it is moving.   Whenever he is moving around he feels off-balance.    He denies Nausea, vomiting, issues swallowing, difficulty swallowing.  Patient denies neck and arm pain.  However he has noticed tingling in the lower back.     Patient has been wearing soft collar.   Patient denies acute vision changes. Has blue light reading glasses.  Hearing test on the job last week- passed, no changes or hearing loss.    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  Cervical corpectomy diskectomy decompression and bilateral foraminotomy C4 with removal of 100% of the vertebral body  2.  Cervical arthrodesis C3-5  3. Cervical discectomy and foraminotomy bilateral C3-4,4-5   5.  Placement of structural  interbody cage C3-5  7.  Use of allograft bone   8.  Use of autograft bone   9.  Placement of anterior cervical plate C3-5  10.  Use of microscope for dissection during corpectomy  11.  Use of intraoperative neuro monitoring       Objective:     Body mass index is 29.13 kg/m².  Vitals:    10/25/22 0939   BP: (!) 137/94   Pulse: 95   Resp: 18            Neck:  None  Paraspinal tenderness   None  " Paraspinal muscle spasms   None  Pain with flexion and extention   WNL  Range of motion shoulders   Neg Not tested Spurling's sign     Motor:   Right Right Left Left  Level Group   5  5  C5 Deltoid   5  5  C6 Bicep   5  5   Wrist extension    5  5  C7 Triceps   5  5   Wrist flexion   5  5  C8    5  5  T1 Interossei      Sensation:  NL Decreased (R/L/BL) Level Sensation    [x]   C5 Lateral upper arm   [x]   C6 Thumb and index finger, lat forearm   [x]   C7 Middle finger   [x]   C8 Ring and little finger   [x]   T1 Medial arm      Reflex:  2+  Bicep tendon   2+  Brachioradialis   2+  Triceps tendon   Not present  Hinojosa's   none  Clonus   neg  Tinel's         Lab Results   Component Value Date    WBC 3.71 (L) 06/07/2022    HCT 46.9 06/07/2022     CT Cervical spine:  FINDINGS:  Postsurgical fusion changes C3 through C5 with partial C4 corpectomy noted.  No change in hardware compared to prior radiographs.  No evidence of loosening appreciated.  Alignment is stable with straightening of the cervical lordosis..  No acute fracture.     Previously noted C3-4 disc protrusion appears less prevalent.  No high-grade spinal canal stenosis or neural foraminal narrowing.  Degenerative findings at C5-6 are unchanged.     Prevertebral soft tissues unremarkable.  Lung apices clear.     Impression:     Cervical spine postsurgical findings wi     Results for orders placed during the hospital encounter of 08/26/22    X-Ray Cervical Spine 2 or 3 Views    Narrative  EXAMINATION:  XR CERVICAL SPINE 2 OR 3 VIEWS    CLINICAL HISTORY:  s/p ACDF; Arthrodesis status    TECHNIQUE:  AP, lateral and open mouth views of the cervical spine were performed.    COMPARISON:  Cervical spine radiographs-07/29/2022 and 07/13/2022.    FINDINGS:  There are postoperative changes of partial corpectomy at C4 with a vertically oriented bone strut present in the corpectomy defect.  There also postoperative changes of ACDF at C3-C5 utilizing bilateral  vertebral body screws and a metallic fixation plate.  No hardware complication.  There is chronic anterior wedging of the C5 vertebral body.  No radiographically evident acute fracture or osseous destructive process.  There is straightening of the normal cervical lordosis, likely postoperative.  There is unchanged prevertebral soft tissue prominence anterior to the C4-C7 levels.  The airway is patent.      INDEPENDENT INTERPRETATION OF TEST:  See above.    Assessment:     1. Status post cervical spinal fusion    2. Dizziness    3. Encounter for postoperative wound check      Plan:     Status post cervical spinal fusion  -     Ambulatory referral/consult to ENT; Future; Expected date: 11/01/2022  -     X-Ray Cervical Spine AP Lat with Flex Ex; Future; Expected date: 01/25/2023    Dizziness  -     Ambulatory referral/consult to ENT; Future; Expected date: 11/01/2022  -     X-Ray Cervical Spine AP Lat with Flex Ex; Future; Expected date: 01/25/2023    Encounter for postoperative wound check  -     X-Ray Cervical Spine AP Lat with Flex Ex; Future; Expected date: 01/25/2023    Discussed recent CT of cervical spine.   Consider vestibular therapy.  Referral to ENT for further eval regarding dizziness/tinnitus.  Follow-up in 3 months with repeat imaging.    Yahir Zapata PA-C  [facility]

## 2022-10-25 NOTE — PROGRESS NOTES
"Subjective:       Patient ID: Alphonse Claude Mackey III is a 32 y.o. male.    Chief Complaint: Demyelination, Corpus callosum Central           HPI       BACKGROUND HISTORY       The patient was referred by Dr. Sandoval for "dizziness".     The patient reports longstanding history of feeling off balance that became prominent in the last 3 months (). Denies spinning or lightheadedness. Describes his symptom as feeling off balance while walking especially when he moves fast (not his head). The feeling comes multiple times a day, not with every time he walks. Sitting and stopping alleviates the feeling in addition to talking and being mentally engaged. No associated neurological symptoms. No weakness, numbness, loss of hearing or vision, tremors, trouble swallowing or double vision. Endorses occasional mild headaches that respond to Tylenol.  No trauma. Has significant history of ROZINA. ENT evaluation and Cardiology evaluation were normal. On 01- CBC, CMP, TFT NL. On 10- EKG, TTE and Holter NL. On 03- Audiology evaluation was NL. Ordered  Brain MRI WWO R/O CVD and Demyelination and CTA H/N R/O VBI and IC/EC dissection or malformations. On 04- CTA H/N NL , Brain MRI CC Splenial T2 signal. Denies alcohol intake. Ordered B Complex . On 04- B Complex with HC, MMA Unremarkable. Continues to have perceptual postural dizziness that is alleviated by being busy and engaging.            INTERVAL HISTORY     Patient new to me, but known to Dr. Barr. Patient doing well. He is status post Discectomy spine cervical anterior approach with fusion Left dissection on 07-. Patient recovering well. He reports despite procedure he continues to have perceptual postural dizziness, he reports that he plans to visit ENT because recently he has began to have bilateral tinnitus. Patient is being followed by Neurosurgery and will follow up again in 3 months. No new neurological complaints. Noted. " Plan to repeat MRI Brain WWO. Patient verbalized understanding of the plan of care.       The originating site (patient location) is: Home.        The distant site (neurologist location) is: [facility] at [facility].        The chief complaint leading to consultation is: F/U PERCEPTUAL PAROXYSMAL IMBALANCE, PERSISTENT POSTURAL-PERCEPTUAL DIZZINESS (PPPD)        Visit type: Virtual visit with synchronous audio and video.        Total time spent with patient: 40 minutes         Special circumstances: This visit occurred during COVID-19 Pandemic Public Health Emergency.         Consent: The patient verbally consented to participating in the video visit and informed that may decline to receive medical services by telemedicine and may withdraw from such care at any time.        I discussed with the patient the nature of our telemedicine visits, that:        I  would evaluate the patient and recommend diagnostics and treatments based on my assessment.     Our sessions are not being recorded and that personal health information is protected.     Our team would provide follow up care in person if/when the patient needs it.     Virtual (video/telemedicine) visits have significant limitations. A telemedicine exam is primarily focused on the history and what I can observe. Several critical parts of the neurological exam cannot be performed.         Review of Systems   Constitutional:  Negative for appetite change and fatigue.   HENT:  Negative for hearing loss and tinnitus.    Eyes:  Negative for photophobia and visual disturbance.   Respiratory:  Negative for apnea and shortness of breath.    Cardiovascular:  Negative for chest pain and palpitations.   Gastrointestinal:  Negative for nausea and vomiting.   Endocrine: Negative for cold intolerance and heat intolerance.   Genitourinary:  Negative for difficulty urinating and urgency.   Musculoskeletal:  Negative for arthralgias, back pain, gait problem, joint  swelling, myalgias, neck pain and neck stiffness.   Skin:  Negative for color change and rash.   Allergic/Immunologic: Negative for environmental allergies and immunocompromised state.   Neurological:  Positive for light-headedness. Negative for dizziness, tremors, seizures, syncope, facial asymmetry, speech difficulty, weakness, numbness and headaches.   Hematological:  Negative for adenopathy. Does not bruise/bleed easily.   Psychiatric/Behavioral:  Negative for agitation, behavioral problems, confusion, decreased concentration, dysphoric mood, hallucinations, self-injury, sleep disturbance and suicidal ideas. The patient is nervous/anxious. The patient is not hyperactive.                Current Outpatient Medications:     b complex vitamins tablet, Take 1 tablet by mouth once daily., Disp: , Rfl:     busPIRone (BUSPAR) 10 MG tablet, Take 1 tablet (10 mg total) by mouth once daily., Disp: 90 tablet, Rfl: 1    cholecalciferol, vitamin D3, (VITAMIN D3) 50 mcg (2,000 unit) Cap capsule, Take by mouth once daily., Disp: , Rfl:     diazePAM (VALIUM) 5 MG tablet, Take 1 tablet (5 mg total) by mouth once as needed for Anxiety (take 30mins prior to MRI testing)., Disp: 1 tablet, Rfl: 0    metoprolol succinate (TOPROL-XL) 25 MG 24 hr tablet, Take 1 tablet (25 mg total) by mouth once daily., Disp: 90 tablet, Rfl: 1    multivit,calc,min/FA/K1/lycop (ONE-A-DAY MEN'S COMPLETE ORAL), Take by mouth., Disp: , Rfl:     MULTIVIT-MIN-FOLIC-VIT K-LYCOP ORAL, Take 50 mg by mouth., Disp: , Rfl:     multivitamin capsule, Take 1 capsule by mouth once daily., Disp: , Rfl:     omega-3 fatty acids/fish oil (FISH OIL-OMEGA-3 FATTY ACIDS) 300-1,000 mg capsule, Take by mouth once daily., Disp: , Rfl:     valsartan (DIOVAN) 40 MG tablet, Take 1 tablet (40 mg total) by mouth once daily., Disp: 90 tablet, Rfl: 0    VITAMIN B COMPLEX ORAL, Take 1 tablet by mouth once daily., Disp: , Rfl:   Past Medical History:   Diagnosis Date    Anxiety      Cervical radiculopathy 7/14/2022    Primary hypertension 1/6/2022     Past Surgical History:   Procedure Laterality Date    ADENOIDECTOMY      ANTERIOR CERVICAL DISCECTOMY W/ FUSION Left 7/13/2022    Procedure: DISCECTOMY, SPINE, CERVICAL, ANTERIOR APPROACH, WITH FUSION;  Surgeon: Yash Fortune MD;  Location: [facility];  Service: Neurosurgery;  Laterality: Left;  ACDF vs Corpectomy C3-5  Will need Dr. Nuno    DISSECTION OF NECK Left 7/13/2022    Procedure: DISSECTION, NECK;  Surgeon: Westley Nuno MD;  Location: [facility] OR;  Service: ENT;  Laterality: Left;  ACDF vs Corpectomy C3-5    TONSILLECTOMY       Social History     Socioeconomic History    Marital status: Single   Tobacco Use    Smoking status: Never    Smokeless tobacco: Never   Substance and Sexual Activity    Alcohol use: Never    Drug use: Never    Sexual activity: Yes     Partners: Female   Social History Narrative    No smokers or pets in household.             Past/Current Medical/Surgical History, Past/Current Social History, Past/Current Family History and Past/Current Medications were reviewed in detail.        Objective:           VITAL SIGNS WERE REVIEWED      GENERAL APPEARANCE:     The patient looks comfortable.    BMI 30.40    No signs of respiratory distress.    Normal breathing pattern.    No dysmorphic features    Normal eye contact.     GENERAL MEDICAL EXAM:    HEENT:  Head is atraumatic normocephalic. Fundoscopic (Ophthalmoscopic) exam showed no disc edema.      Neck and Axillae: No JVD. No visible lesions.    Cardiopulmonary: No cyanosis. No tachypnea. Normal respiratory effort.    Gastrointestinal/Urogenital:  No jaundice. No stomas or lesions. No visible hernias. No catheters.     Skin, Hair and Nails: No pathognonomic skin rash. No neurofibromatosis. No visible lesions.No stigmata of autoimmune disease. No clubbing.    Limbs: No varicose veins. No visible swelling.    Muskoskeletal: No visible deformities.No visible lesions.            Neurologic Exam     Mental Status   Oriented to person, place, and time.   Follows 3 step commands.   Attention: normal. Concentration: normal.   Speech: speech is normal   Level of consciousness: alert  Knowledge: good.   Able to name object. Able to repeat. Normal comprehension.     Cranial Nerves   Cranial nerves II through XII intact.     CN II   Visual fields full to confrontation.   Visual acuity: normal  Right visual field deficit: none  Left visual field deficit: none     CN III, IV, VI   Pupils are equal, round, and reactive to light.  Extraocular motions are normal.   Right pupil: Size: 2 mm. Shape: regular. Reactivity: brisk. Consensual response: intact. Accommodation: intact.   Left pupil: Size: 2 mm. Shape: regular. Reactivity: brisk. Consensual response: intact. Accommodation: intact.   CN III: no CN III palsy  CN VI: no CN VI palsy  Nystagmus: none   Diplopia: none  Ophthalmoparesis: none  Upgaze: normal  Downgaze: normal  Conjugate gaze: present  Vestibulo-ocular reflex: present    CN V   Facial sensation intact.   Right facial sensation deficit: none  Left facial sensation deficit: none    CN VII   Facial expression full, symmetric.   Right facial weakness: none  Left facial weakness: none    CN VIII   CN VIII normal.   Hearing: intact    CN IX, X   CN IX normal.   CN X normal.   Palate: symmetric    CN XI   CN XI normal.   Right sternocleidomastoid strength: normal  Left sternocleidomastoid strength: normal  Right trapezius strength: normal  Left trapezius strength: normal    CN XII   CN XII normal.   Tongue: not atrophic  Fasciculations: absent  Tongue deviation: none    Motor Exam   Muscle bulk: normal  Overall muscle tone: normal  Right arm tone: normal  Left arm tone: normal  Right arm pronator drift: absent  Left arm pronator drift: absent  Right leg tone: normal  Left leg tone: normal    Strength   Strength 5/5 throughout.   Right neck flexion: 5/5  Left neck flexion: 5/5  Right neck  extension: 5/5  Left neck extension: 5/5  Right deltoid: 5/5  Left deltoid: 5/5  Right biceps: 5/5  Left biceps: 5/5  Right triceps: 5/5  Left triceps: 5/5  Right wrist flexion: 5/5  Left wrist flexion: 5/5  Right wrist extension: 5/  Left wrist extension: 5/  Right interossei: 5/  Left interossei: 5/  Right iliopsoas:   Left iliopsoas:   Right quadriceps: 5  Left quadriceps: 5/  Right hamstrin  Left hamstrin  Right glutei: 5  Left glutei:   Right anterior tibial:   Left anterior tibial:   Right posterior tibial:   Left posterior tibial:   Right peroneal:   Left peroneal:   Right gastroc:   Left gastroc:     Sensory Exam   Light touch normal.   Right arm light touch: normal  Left arm light touch: normal  Right leg light touch: normal  Left leg light touch: normal  Vibration normal.   Right arm vibration: normal  Left arm vibration: normal  Right leg vibration: normal  Left leg vibration: normal  Proprioception normal.   Right arm proprioception: normal  Left arm proprioception: normal  Right leg proprioception: normal  Left leg proprioception: normal  Pinprick normal.   Right arm pinprick: normal  Left arm pinprick: normal  Right leg pinprick: normal  Left leg pinprick: normal  Graphesthesia: normal  Stereognosis: normal    Gait, Coordination, and Reflexes     Gait  Gait: normal    Coordination   Romberg: negative  Finger to nose coordination: normal  Heel to shin coordination: normal  Tandem walking coordination: normal    Tremor   Resting tremor: absent  Intention tremor: absent  Action tremor: absent    Reflexes   Right brachioradialis: 2+  Left brachioradialis: 2+  Right biceps: 2+  Left biceps: 2+  Right triceps: 2+  Left triceps: 2+  Right patellar: 2+  Left patellar: 2+  Right achilles: 2+  Left achilles: 2+  Right plantar: normal  Left plantar: normal  Right Hinojosa: absent  Left Hinojosa: absent  Right ankle clonus: absent  Left ankle clonus:  absent  Right pendular knee jerk: absent  Left pendular knee jerk: absent    Lab Results   Component Value Date    WBC 3.71 (L) 06/07/2022    HGB 15.7 06/07/2022    HCT 46.9 06/07/2022    MCV 89 06/07/2022     06/07/2022     Sodium   Date Value Ref Range Status   06/07/2022 140 136 - 145 mmol/L Final     Potassium   Date Value Ref Range Status   06/07/2022 4.7 3.5 - 5.1 mmol/L Final     Chloride   Date Value Ref Range Status   06/07/2022 101 95 - 110 mmol/L Final     CO2   Date Value Ref Range Status   06/07/2022 26 23 - 29 mmol/L Final     Glucose   Date Value Ref Range Status   06/07/2022 81 70 - 110 mg/dL Final     BUN   Date Value Ref Range Status   06/07/2022 13 6 - 20 mg/dL Final     Creatinine   Date Value Ref Range Status   06/07/2022 1.3 0.5 - 1.4 mg/dL Final     Calcium   Date Value Ref Range Status   06/07/2022 10.1 8.7 - 10.5 mg/dL Final     Total Protein   Date Value Ref Range Status   06/07/2022 7.7 6.0 - 8.4 g/dL Final     Albumin   Date Value Ref Range Status   06/07/2022 4.5 3.5 - 5.2 g/dL Final     Total Bilirubin   Date Value Ref Range Status   06/07/2022 0.9 0.1 - 1.0 mg/dL Final     Comment:     For infants and newborns, interpretation of results should be based  on gestational age, weight and in agreement with clinical  observations.    Premature Infant recommended reference ranges:  Up to 24 hours.............<8.0 mg/dL  Up to 48 hours............<12.0 mg/dL  3-5 days..................<15.0 mg/dL  6-29 days.................<15.0 mg/dL       Alkaline Phosphatase   Date Value Ref Range Status   06/07/2022 45 (L) 55 - 135 U/L Final     AST   Date Value Ref Range Status   06/07/2022 23 10 - 40 U/L Final     ALT   Date Value Ref Range Status   06/07/2022 21 10 - 44 U/L Final     Anion Gap   Date Value Ref Range Status   06/07/2022 13 8 - 16 mmol/L Final     eGFR if    Date Value Ref Range Status   06/07/2022 >60.0 >60 mL/min/1.73 m^2 Final     eGFR if non African American    Date Value Ref Range Status   06/07/2022 >60.0 >60 mL/min/1.73 m^2 Final     Comment:     Calculation used to obtain the estimated glomerular filtration  rate (eGFR) is the CKD-EPI equation.        Lab Results   Component Value Date    [id number] 04/29/2022     Lab Results   Component Value Date    TSH 1.415 01/04/2022 01-    CBC, CMP, TFT NL        11-    MRI Brain WWO:     Normal Brain MRI    0-    EKG, TTE and Holter NL     03-    Audiology evaluation was NL.      04-    CTA H/N NL       04-    Brain MRI CC Splenial T2 signal (Incidental)             04-      B Complex with HC, MMA Unremarkable   B1 (Thiamine)   B2 (Riboflavin)  B3 (Niacin)  B4-Not essential  B5 (Pantothenic Acid)  B6 (Pyridoxine)  B7 (Biotin)  B8-Not essential  B9 (Folate)  B10-Not essential  B11-Not essential   B12 (Cyanocobalamin)        05-    T-Spine MRI WWO NL    C-Spine WWO Severe IVDP C4-C5 with myelopathic changes at C4-C5           Reviewed the neuroimaging independently       Assessment:       1. Demyelination, corpus callosum central    2. Persistent postural-perceptual dizziness    3. Panic disorder without agoraphobia with mild panic attacks    4. Dizziness and giddiness    5. Imbalance    6. Degenerative disc disease, cervical    7. Cervical disc disorder at C4-C5 level with myelopathy    8. Corpus callosum white matter abnormalities present on MRI          Plan:         PERCEPTUAL PAROXYSMAL IMBALANCE, PERSISTENT POSTURAL-PERCEPTUAL DIZZINESS (PPPD)    Recommended ROZINA management.    Follow up with ENT     INCIDENTAL SPLENIUM T2 SIGNAL CHANGE     Repeat Brain  MRI WWO in 6 months.     STATUS POST CERVICAL FUSION    Follow up with Neurosurgery as directed                MEDICAL/SURGICAL COMORBIDITIES     All relevant medical comorbidities noted and managed by primary care physician and medical care team.          HEALTHY LIFESTYLE AND PREVENTATIVE CARE    The patient  to adhere to the age-appropriate health maintenance guidelines including screening tests and vaccinations. The patient to adhere to  healthy lifestyle, optimal weight, exercise, healthy diet, good sleep hygiene and avoiding drugs including smoking, alcohol and recreational drugs.          RTC in 3 months      Jones Hector, MSN NP      Collaborating Provider: Rafael Barr MD, FAAN Neurologist/Epileptologist

## 2022-10-31 ENCOUNTER — OFFICE VISIT (OUTPATIENT)
Dept: INTERNAL MEDICINE | Facility: CLINIC | Age: 32
End: 2022-10-31
Payer: COMMERCIAL

## 2022-10-31 VITALS
OXYGEN SATURATION: 98 % | HEIGHT: 70 IN | DIASTOLIC BLOOD PRESSURE: 88 MMHG | RESPIRATION RATE: 18 BRPM | BODY MASS INDEX: 30.33 KG/M2 | WEIGHT: 211.88 LBS | TEMPERATURE: 97 F | HEART RATE: 67 BPM | SYSTOLIC BLOOD PRESSURE: 132 MMHG

## 2022-10-31 DIAGNOSIS — I10 PRIMARY HYPERTENSION: ICD-10-CM

## 2022-10-31 DIAGNOSIS — F41.0 PANIC DISORDER WITHOUT AGORAPHOBIA WITH MILD PANIC ATTACKS: Primary | ICD-10-CM

## 2022-10-31 DIAGNOSIS — M54.12 CERVICAL RADICULOPATHY: Primary | ICD-10-CM

## 2022-10-31 DIAGNOSIS — F41.0 PANIC DISORDER WITHOUT AGORAPHOBIA WITH MILD PANIC ATTACKS: ICD-10-CM

## 2022-10-31 DIAGNOSIS — F41.9 ANXIETY: ICD-10-CM

## 2022-10-31 PROCEDURE — 3075F SYST BP GE 130 - 139MM HG: CPT | Mod: CPTII,S$GLB,, | Performed by: INTERNAL MEDICINE

## 2022-10-31 PROCEDURE — 99214 PR OFFICE/OUTPT VISIT, EST, LEVL IV, 30-39 MIN: ICD-10-PCS | Mod: S$GLB,,, | Performed by: INTERNAL MEDICINE

## 2022-10-31 PROCEDURE — 99999 PR PBB SHADOW E&M-EST. PATIENT-LVL III: ICD-10-PCS | Mod: PBBFAC,,, | Performed by: INTERNAL MEDICINE

## 2022-10-31 PROCEDURE — 4010F ACE/ARB THERAPY RXD/TAKEN: CPT | Mod: CPTII,S$GLB,, | Performed by: INTERNAL MEDICINE

## 2022-10-31 PROCEDURE — 4010F PR ACE/ARB THEARPY RXD/TAKEN: ICD-10-PCS | Mod: CPTII,S$GLB,, | Performed by: INTERNAL MEDICINE

## 2022-10-31 PROCEDURE — 3075F PR MOST RECENT SYSTOLIC BLOOD PRESS GE 130-139MM HG: ICD-10-PCS | Mod: CPTII,S$GLB,, | Performed by: INTERNAL MEDICINE

## 2022-10-31 PROCEDURE — 99999 PR PBB SHADOW E&M-EST. PATIENT-LVL III: CPT | Mod: PBBFAC,,, | Performed by: INTERNAL MEDICINE

## 2022-10-31 PROCEDURE — 3079F PR MOST RECENT DIASTOLIC BLOOD PRESSURE 80-89 MM HG: ICD-10-PCS | Mod: CPTII,S$GLB,, | Performed by: INTERNAL MEDICINE

## 2022-10-31 PROCEDURE — 3079F DIAST BP 80-89 MM HG: CPT | Mod: CPTII,S$GLB,, | Performed by: INTERNAL MEDICINE

## 2022-10-31 PROCEDURE — 1159F PR MEDICATION LIST DOCUMENTED IN MEDICAL RECORD: ICD-10-PCS | Mod: CPTII,S$GLB,, | Performed by: INTERNAL MEDICINE

## 2022-10-31 PROCEDURE — 99214 OFFICE O/P EST MOD 30 MIN: CPT | Mod: S$GLB,,, | Performed by: INTERNAL MEDICINE

## 2022-10-31 PROCEDURE — 1159F MED LIST DOCD IN RCRD: CPT | Mod: CPTII,S$GLB,, | Performed by: INTERNAL MEDICINE

## 2022-10-31 RX ORDER — VALSARTAN 40 MG/1
40 TABLET ORAL DAILY
Qty: 90 TABLET | Refills: 0 | Status: SHIPPED | OUTPATIENT
Start: 2022-10-31 | End: 2023-01-27

## 2022-10-31 RX ORDER — BUSPIRONE HYDROCHLORIDE 10 MG/1
10 TABLET ORAL DAILY
Qty: 90 TABLET | Refills: 1 | Status: SHIPPED | OUTPATIENT
Start: 2022-10-31 | End: 2023-01-27

## 2022-10-31 NOTE — ASSESSMENT & PLAN NOTE
Patient reports stable.  Counseling is helping.  He is compliant with medications.  Advised to continue counseling and medications.

## 2022-10-31 NOTE — PROGRESS NOTES
"Subjective:      Patient ID: Alphonse Claude Mackey III is a 32 y.o. male.    Chief Complaint: Follow-up    HPI    31 yo with   Patient Active Problem List   Diagnosis    Panic disorder without agoraphobia with mild panic attacks    Prolapsed internal hemorrhoids, grade 2    Cervical pain    Primary hypertension    Tachycardia, paroxysmal    Other chest pain    EKG abnormalities    Imbalance    Demyelination, corpus callosum central    Persistent postural-perceptual dizziness    Claustrophobia    Cervical radiculopathy    Degenerative disc disease, cervical     Past Medical History:   Diagnosis Date    Anxiety     Cervical radiculopathy 7/14/2022    Primary hypertension 1/6/2022     Here today for f/u on htn and anxiety.     Stopped metoprolol oct 12 due to 102 systolic. 109-129 / 70s to 81.     Review of Systems   Constitutional:  Negative for chills and fever.   Cardiovascular:  Negative for chest pain.   Gastrointestinal:  Negative for abdominal pain.   Neurological:  Negative for syncope.   Objective:   /88 (BP Location: Right arm, Patient Position: Sitting, BP Method: Large (Manual))   Pulse 67   Temp 96.9 °F (36.1 °C) (Tympanic)   Resp 18   Ht 5' 10" (1.778 m)   Wt 96.1 kg (211 lb 13.8 oz)   SpO2 98%   BMI 30.40 kg/m²     Physical Exam  Constitutional:       General: He is awake.      Appearance: Normal appearance.   HENT:      Head: Normocephalic and atraumatic.   Eyes:      Conjunctiva/sclera: Conjunctivae normal.   Cardiovascular:      Rate and Rhythm: Normal rate and regular rhythm.   Pulmonary:      Effort: Pulmonary effort is normal.      Breath sounds: Normal breath sounds.   Musculoskeletal:      Cervical back: Normal range of motion.   Neurological:      Mental Status: He is alert. Mental status is at baseline.   Psychiatric:         Mood and Affect: Mood normal.         Behavior: Behavior normal. Behavior is cooperative.       Lab Results   Component Value Date    WBC 3.71 (L) 06/07/2022 "    HGB 15.7 06/07/2022    HCT 46.9 06/07/2022     06/07/2022    CHOL 182 01/04/2022    TRIG 42 01/04/2022    HDL 53 01/04/2022    LDLCALC 120.6 01/04/2022    LDLCALC 115.0 12/10/2020    LDLCALC 101.4 06/01/2018    ALT 21 06/07/2022    AST 23 06/07/2022     06/07/2022    K 4.7 06/07/2022     06/07/2022    CREATININE 1.3 06/07/2022    CREATININE 1.1 05/16/2022    CREATININE 0.9 01/04/2022    BUN 13 06/07/2022    CO2 26 06/07/2022    TSH 1.415 01/04/2022    INR 1.0 06/07/2022        The ASCVD Risk score (Jessica DK, et al., 2019) failed to calculate for the following reasons:    The 2019 ASCVD risk score is only valid for ages 40 to 79     Assessment:     1. Panic disorder without agoraphobia with mild panic attacks    2. Primary hypertension    3. Anxiety      Plan:   1. Panic disorder without agoraphobia with mild panic attacks  Assessment & Plan:  Patient reports stable.  Counseling is helping.  He is compliant with medications.  Advised to continue counseling and medications.      2. Primary hypertension  Assessment & Plan:  Good readings at home.  Patient self discontinued metoprolol couple of weeks ago due to lower systolic blood pressure without symptoms.  He is aware to resume metoprolol if blood pressure rises above goal or has increase in palpitations.    Orders:  -     valsartan (DIOVAN) 40 MG tablet; Take 1 tablet (40 mg total) by mouth once daily.  Dispense: 90 tablet; Refill: 0    3. Anxiety  -     busPIRone (BUSPAR) 10 MG tablet; Take 1 tablet (10 mg total) by mouth once daily.  Dispense: 90 tablet; Refill: 1        Future Appointments   Date Time Provider Department Center   11/1/2022 10:15 AM Nino Sandoval MD [facility]  [facility]   11/2/2022 10:00 AM [facility] MRI1 [facility] [place]   1/18/2023  8:00 AM LAB[facility]ATORY, [facility] [place]   1/26/2023  2:00 PM Claus Goodman MD [facility]  [place]   1/27/2023 10:00 AM [facility] [place]   1/27/2023 11:30 AM [facility]  [facility]URO [facility]       Lab Frequency Next Occurrence   Ambulatory referral/consult to Physical/Occupational Therapy Once 12/06/2021   Ambulatory referral/consult to Psychiatry Once 01/11/2022   CBC Auto Differential Once 01/25/2023   Comprehensive Metabolic Panel Once 01/25/2023   Lipid Panel Once 01/25/2023   TSH Once 01/25/2023   Ambulatory referral/consult to ENT Once 11/01/2022   X-Ray Cervical Spine AP Lat with Flex Ex Once 01/25/2023   MRI Brain W WO Contrast Once 10/25/2022       Follow up in about 3 months (around 1/31/2023), or if symptoms worsen or fail to improve.

## 2022-10-31 NOTE — ASSESSMENT & PLAN NOTE
Good readings at home.  Patient self discontinued metoprolol couple of weeks ago due to lower systolic blood pressure without symptoms.  He is aware to resume metoprolol if blood pressure rises above goal or has increase in palpitations.

## 2022-11-01 ENCOUNTER — OFFICE VISIT (OUTPATIENT)
Dept: OTOLARYNGOLOGY | Facility: CLINIC | Age: 32
End: 2022-11-01
Payer: COMMERCIAL

## 2022-11-01 ENCOUNTER — LAB VISIT (OUTPATIENT)
Dept: LAB | Facility: HOSPITAL | Age: 32
End: 2022-11-01
Attending: NURSE PRACTITIONER
Payer: COMMERCIAL

## 2022-11-01 VITALS — TEMPERATURE: 99 F | WEIGHT: 211.63 LBS | BODY MASS INDEX: 30.37 KG/M2

## 2022-11-01 DIAGNOSIS — R26.89 IMBALANCE: ICD-10-CM

## 2022-11-01 DIAGNOSIS — R42 DIZZINESS: ICD-10-CM

## 2022-11-01 DIAGNOSIS — H81.8X9 PERSISTENT POSTURAL-PERCEPTUAL DIZZINESS: Primary | ICD-10-CM

## 2022-11-01 DIAGNOSIS — Z98.1 STATUS POST CERVICAL SPINAL FUSION: ICD-10-CM

## 2022-11-01 DIAGNOSIS — M54.12 CERVICAL RADICULOPATHY: ICD-10-CM

## 2022-11-01 DIAGNOSIS — F41.0 PANIC DISORDER WITHOUT AGORAPHOBIA WITH MILD PANIC ATTACKS: ICD-10-CM

## 2022-11-01 LAB
CREAT SERPL-MCNC: 1.2 MG/DL (ref 0.5–1.4)
EST. GFR  (NO RACE VARIABLE): >60 ML/MIN/1.73 M^2

## 2022-11-01 PROCEDURE — 99214 PR OFFICE/OUTPT VISIT, EST, LEVL IV, 30-39 MIN: ICD-10-PCS | Mod: S$GLB,,, | Performed by: STUDENT IN AN ORGANIZED HEALTH CARE EDUCATION/TRAINING PROGRAM

## 2022-11-01 PROCEDURE — 1159F MED LIST DOCD IN RCRD: CPT | Mod: CPTII,S$GLB,, | Performed by: STUDENT IN AN ORGANIZED HEALTH CARE EDUCATION/TRAINING PROGRAM

## 2022-11-01 PROCEDURE — 99214 OFFICE O/P EST MOD 30 MIN: CPT | Mod: S$GLB,,, | Performed by: STUDENT IN AN ORGANIZED HEALTH CARE EDUCATION/TRAINING PROGRAM

## 2022-11-01 PROCEDURE — 4010F PR ACE/ARB THEARPY RXD/TAKEN: ICD-10-PCS | Mod: CPTII,S$GLB,, | Performed by: STUDENT IN AN ORGANIZED HEALTH CARE EDUCATION/TRAINING PROGRAM

## 2022-11-01 PROCEDURE — 82565 ASSAY OF CREATININE: CPT | Performed by: NURSE PRACTITIONER

## 2022-11-01 PROCEDURE — 36415 COLL VENOUS BLD VENIPUNCTURE: CPT | Performed by: NURSE PRACTITIONER

## 2022-11-01 PROCEDURE — 99999 PR PBB SHADOW E&M-EST. PATIENT-LVL III: CPT | Mod: PBBFAC,,, | Performed by: STUDENT IN AN ORGANIZED HEALTH CARE EDUCATION/TRAINING PROGRAM

## 2022-11-01 PROCEDURE — 99999 PR PBB SHADOW E&M-EST. PATIENT-LVL III: ICD-10-PCS | Mod: PBBFAC,,, | Performed by: STUDENT IN AN ORGANIZED HEALTH CARE EDUCATION/TRAINING PROGRAM

## 2022-11-01 PROCEDURE — 1159F PR MEDICATION LIST DOCUMENTED IN MEDICAL RECORD: ICD-10-PCS | Mod: CPTII,S$GLB,, | Performed by: STUDENT IN AN ORGANIZED HEALTH CARE EDUCATION/TRAINING PROGRAM

## 2022-11-01 PROCEDURE — 4010F ACE/ARB THERAPY RXD/TAKEN: CPT | Mod: CPTII,S$GLB,, | Performed by: STUDENT IN AN ORGANIZED HEALTH CARE EDUCATION/TRAINING PROGRAM

## 2022-11-01 NOTE — PROGRESS NOTES
Chief complaint:   Chief Complaint   Patient presents with    Dizziness     Pt states he had a disc fusion 7/13/22. C/o dizziness all day everyday since then.States when lies down on back feels off balance. Feels like he is rocking from side to side. C./o tinnitus and pressure bilaterally but worse on left          History of Present Illness, 3/3/22:     Mr. Willard is a 32 y.o. male presenting for evaluation of dizziness.     Onset:  A couple months  Frequency of episodes: daily  Duration of individual episodes: lasts while he is standing or walking  Exacerbating factors:  walking or standing  Prior Medications: nothing  Relieving factors:  remaining motionless  Quality: off balance, swaying in place, floaty, denies any spinning or lightheadness  Prior history of similar events: No    Associated signs and symptoms:      Anxiety, HTN.     Had normal cardiac workup.     Denies hearing loss, tinnitus, ear fullness, headache, light sensitivity, vomiting.    Return clinic visit, 11/1/22    No improvement since spinal fusion  Still feels dizzy all day. When moving around or with eyes closed feels wobbly and like he's moving around. Feels like his body is moving off balance and moving around. Leans forward or reaches for something andf eels like he is going to fall.     Also with episodes of ear fullness/pressure that last a few seconds with associated tinnitus. No hearing loss.     Review of Systems     A complete 10 point ROS was completed and are positive as per above HPI.    Otherwise negative for fever, diplopia, chest pain, shortness of breath, vomiting, blood in urine, joint pain, skin rash, seizures and unusual bleeding.       History        Past Medical History:   Past Medical History:   Diagnosis Date    Anxiety     Cervical radiculopathy 7/14/2022    Primary hypertension 1/6/2022    .          Past Surgical History:  Past Surgical History:   Procedure Laterality Date    ADENOIDECTOMY      ANTERIOR CERVICAL  DISCECTOMY W/ FUSION Left 7/13/2022    Procedure: DISCECTOMY, SPINE, CERVICAL, ANTERIOR APPROACH, WITH FUSION;  Surgeon: Yash Fortune MD;  Location: [facility] OR;  Service: Neurosurgery;  Laterality: Left;  ACDF vs Corpectomy C3-5  Will need Dr. Nuno    DISSECTION OF NECK Left 7/13/2022    Procedure: DISSECTION, NECK;  Surgeon: Westley Nuno MD;  Location: [facility] OR;  Service: ENT;  Laterality: Left;  ACDF vs Corpectomy C3-5    TONSILLECTOMY     .         Medications: Medication list was reviewed. He  has a current medication list which includes the following prescription(s): b complex vitamins, buspirone, cholecalciferol (vitamin d3), multivit,calc,min/fa/k1/lycop, multivitamin, fish oil-omega-3 fatty acids, valsartan, diazepam, multivit-min/folic/vit k/lycop, and vitamin b complex.         Allergies: Review of patient's allergies indicates:  No Known Allergies         Family history: family history includes Cancer in his paternal grandfather; Hypertension in his mother; No Known Problems in his sister; Obesity in his paternal grandmother.         Social History          Alcohol use:  reports no history of alcohol use.            Tobacco:  reports that he has never smoked. He has never used smokeless tobacco.         Please see the patient's intake form for full details of past medical history, past surgical history, family history, social history and review of systems. ?This information was reviewed by me and noted.      Physical Examination     Vitals:    11/01/22 1010   Temp: 98.6 °F (37 °C)        General: Well developed, well nourished, well hydrated. Verbal with a strong voice and not dysphonic.     Head/Face: Normocephalic, atraumatic. No scars or lesions. Facial musculature equal.     Eyes: No scleral icterus or conjunctival hemorrhage. EOMI. PERRLA.     Ears:     Right ear: No gross deformity. EAC is clear of debris and erythema. The TM is intact with a pneumatized middle ear. No signs of retraction, fluid or  infection.      Left ear: No gross deformity. EAC is clear of debris and erythema. The TM is intact with a pneumatized middle ear. No signs of retraction, fluid or infection.        Neurologic: Moving all extremities without gross abnormality.CN II-XII grossly intact. House-Brackmann 1/6.     Motor strength:  Strength 5/5 throughout.    Sensation:  Sensory function to light touch and proprioception intact throughout.    Gait:  No ataxia and can tandem gait 6 steps.    Nystagmus - none present with left and right lateral gaze    [place] Hallpike - no torsional nystagmus    Head impulse testing: no evidence of catch up saccades    Test of vertical skew: no abnormal visual skew with covering of left or right eye       Audiogram     Audiogram, 3/3/22 was independently reviewed           Labs, 1/4/22  Cr 0.9  Glucose 97  Wbc 4.64  hgb 16.4  plt 266      Review of records:      I reviewed records from the referring provider's office visits.  These describe the history, workup, and/or treatment of this problem thus far:     Neuro 5/5/22  PERCEPTUAL PAROXYSMAL IMBALANCE, PERSISTENT POSTURAL-PERCEPTUAL DIZZINESS (PPPD)     Recommended ROZINA management.     Assessment     1. Persistent postural-perceptual dizziness    2. Status post cervical spinal fusion    3. Dizziness    4. Imbalance          Plan:      Dizziness is an extremely complex problem that may arise from many sources.  I requires the coordination between the visual system, the vestibular system as well as the proprioreceptive system.  Additionally, balance is compromised in the setting of musculoskeletal, cerebral, cardiac, and numerous physiologic disorders.  The complex interplay between these systems may also lead to dizziness if there is dysynchrony between the bilateral vestibular symptoms.    Central vestibular symptoms can generally be distinguished from peripheral vestibulopathies by the presence of other non vestibular neurologic symptoms (focal weakness,  headache), light headedness (rather than true vertigo), near syncope, weak limbs, panic, fuzziness/cloudiness in mentation, and clumsiness.  Peripheral vestibulopathies are generally, at some point during their course, characterized by a true vertiginous sensation of movement, difficulty with sudden head movements, nausea, difficulty with sudden head movement, and possibly oscicllopsia.     Based on the history and physical exam, I suspect the etiology is more likely central in origin and less likely related a peripheral vestibular dysfunction.  I recommend neurology evaluation. Return to clinic if any change in symptoms      Update 11/1/22  S/p spinal fusion, continud dizziness  Recommend screening VNG; if negative for peripheral findings would suspect PPPD        Nino Sandoval MD  [facility]   [facility] - [address]  [place] [phone]  P: [phone]  F: [phone]

## 2022-11-02 ENCOUNTER — HOSPITAL ENCOUNTER (OUTPATIENT)
Dept: RADIOLOGY | Facility: HOSPITAL | Age: 32
Discharge: HOME | End: 2022-11-02
Attending: NURSE PRACTITIONER
Payer: COMMERCIAL

## 2022-11-02 DIAGNOSIS — R42 DIZZINESS AND GIDDINESS: ICD-10-CM

## 2022-11-02 DIAGNOSIS — G37.1: ICD-10-CM

## 2022-11-02 DIAGNOSIS — F41.0 PANIC DISORDER WITHOUT AGORAPHOBIA WITH MILD PANIC ATTACKS: ICD-10-CM

## 2022-11-02 PROCEDURE — 70553 MRI BRAIN STEM W/O & W/DYE: CPT | Mod: TC,PN

## 2022-11-02 PROCEDURE — 25500020 PHARM REV CODE 255: Mod: PN | Performed by: NURSE PRACTITIONER

## 2022-11-02 PROCEDURE — A9585 GADOBUTROL INJECTION: HCPCS | Mod: PN | Performed by: NURSE PRACTITIONER

## 2022-11-02 RX ORDER — GADOBUTROL 604.72 MG/ML
10 INJECTION INTRAVENOUS
Status: COMPLETED | OUTPATIENT
Start: 2022-11-02 | End: 2022-11-02

## 2022-11-02 RX ADMIN — GADOBUTROL 10 ML: 604.72 INJECTION INTRAVENOUS at 10:11

## 2022-11-10 ENCOUNTER — CLINICAL SUPPORT (OUTPATIENT)
Dept: AUDIOLOGY | Facility: CLINIC | Age: 32
End: 2022-11-10
Payer: COMMERCIAL

## 2022-11-10 DIAGNOSIS — R42 CERVICAL VERTIGO: ICD-10-CM

## 2022-11-10 DIAGNOSIS — R42 DIZZINESS: Primary | ICD-10-CM

## 2022-11-10 PROCEDURE — 92541 PR SPONTANEOUS NYSTAGMUS TEST: ICD-10-PCS | Mod: XU,S$GLB,,

## 2022-11-10 PROCEDURE — 92541 SPONTANEOUS NYSTAGMUS TEST: CPT | Mod: XU,S$GLB,,

## 2022-11-10 PROCEDURE — 92542 PR POSITIONAL NYSTAGMUS TEST: ICD-10-PCS | Mod: S$GLB,,,

## 2022-11-10 PROCEDURE — 92542 POSITIONAL NYSTAGMUS TEST: CPT | Mod: S$GLB,,,

## 2022-11-10 NOTE — Clinical Note
For your review. Nystagmus noticed in right hallpike and head right. No nystagmus in any other position or spontaneous. Could be cervical vertigo.

## 2022-11-10 NOTE — PROGRESS NOTES
Referring provider: MD Raj Sandoval Claude Mackey III was seen 11/10/2022 for an audiological and vestibular screening. Patient complained of dizziness since the beginning of this year and has gotten worse since his cervical disc fusion. Patient reported constant imbalance. He noted that the if he turns while walking it feels like he is going to fall over at times. He also noted when laying in bed it feels like the world is shifting. Per report, if he lays down for an extended period of times symptoms will get better. He denied episodes of true vertigo, difficulties hearing, recent noise exposure, and a family history of hearing loss.     Videonystagmography (VNG) Screen:  Spontaneous test was absent for nystagmus.  Gaze test was absent for nystagmus.  Head-shake test was absent for after head-shake nystagmus.  Static Positional test was absent for nystagmus except for 2 d/s right-beating nystagmus in head right.   Giselle-Hallpike Right was negative for BPPV; 3 d/s right and up beating nystagmus in head hanging.   Giselle-Hallpike Left was negative for BPPV.    Summary: Abnormal VNG screen- non-localizing vestibular pathology. Clinically insignificant nystagmus present in right giselle-hallpike and head right while no nystagmus is present in body right could be suggestive of cervical vertigo. Negative for BPPV.     Patient was counseled on the above findings.    Recommendations:  ENT Review.     Tracings are to be scanned.      
Admission

## 2022-11-11 ENCOUNTER — PATIENT MESSAGE (OUTPATIENT)
Dept: OTOLARYNGOLOGY | Facility: CLINIC | Age: 32
End: 2022-11-11
Payer: COMMERCIAL

## 2022-11-11 DIAGNOSIS — R42 DIZZINESS: Primary | ICD-10-CM

## 2022-11-11 DIAGNOSIS — R42 CERVICAL VERTIGO: ICD-10-CM

## 2022-11-25 ENCOUNTER — CLINICAL SUPPORT (OUTPATIENT)
Dept: REHABILITATION | Facility: HOSPITAL | Age: 32
End: 2022-11-25
Attending: STUDENT IN AN ORGANIZED HEALTH CARE EDUCATION/TRAINING PROGRAM
Payer: COMMERCIAL

## 2022-11-25 DIAGNOSIS — R42 CERVICAL VERTIGO: ICD-10-CM

## 2022-11-25 DIAGNOSIS — Z91.81 AT MODERATE RISK FOR FALL: ICD-10-CM

## 2022-11-25 PROCEDURE — 97161 PT EVAL LOW COMPLEX 20 MIN: CPT | Mod: PN

## 2022-11-25 NOTE — PLAN OF CARE
[facility]   Physical Therapy Initial Evaluation     Date: 11/25/2022   Name: Alphonse Claude Mackey III  Clinic Number: [id number]    Therapy Diagnosis:   Encounter Diagnosis   Name Primary?    Cervical vertigo      Physician: Nino Sandoval MD    Physician Orders: PT Eval and Treat   Medical Diagnosis from Referral: Perc  Evaluation Date: 11/25/2022  Authorization Period Expiration: 11/11/2023  Plan of Care Expiration: 1/30/2023  Progress Note Due: 10th visit  Visit # / Visits authorized: 1/ 12   FOTO: 1/ 3     Precautions: Standard and Fall    Time In: 7:30  Time Out: 8:20  Total Appointment Time (timed & untimed codes): 45 minutes    SUBJECTIVE   Date of onset: May 2022    History of current condition - Raj reports: he had a cervical (C4-6) disc fusion 7/13/22. C/o dizziness all day everyday since then.States when lies down on back feels off balance. Feels like he is rocking from side to side. Increased pressure bilaterally but worse on left.    Elevated BP lead to cardiologist with normal results beside tachycardia.   MRI initially confirmed white matter that resolved. Large disc protrusion noted with later testing requiring cervical fusion.   Cleared by cardiology     Onset:  May 2022  Frequency of episodes: daily  Duration of individual episodes: lasts while he is standing or walking  Exacerbating factors: any position, supine, walking or standing  Prior Medications: nothing  Relieving factors:  remaining motionless but even then he feel like his body is in motion  Quality: off balance, swaying in place, floaty, denies any spinning or lightheadness  Prior history of similar events: No    Falls: no but feel unsteady    Imaging, VNG: Summary: Abnormal VNG screen- non-localizing vestibular pathology. Clinically insignificant nystagmus present in right [place]old-hallpike and head right while no nystagmus is present in body right could be suggestive of cervical vertigo. Negative for  BPPV.        Prior Therapy: yes  Social History:  lives with a roommate   Occupation: works in plant  Prior Level of Function: Independent   Current Level of Function: Independent       Dizziness:  Current 3/10, worst 9/10, best 1/10   Location: bilateral head  head  Description: unsteady and dizzy  Aggravating Factors: Bending, Walking, and Flexing  Easing Factors: working    Patients goals: decrease dizziness     Medical History:   Past Medical History:   Diagnosis Date    Anxiety     Cervical radiculopathy 7/14/2022    Primary hypertension 1/6/2022       Surgical History:   Alphonse Claude Mackey III  has a past surgical history that includes Adenoidectomy; Tonsillectomy; Anterior cervical discectomy w/ fusion (Left, 7/13/2022); and Dissection of neck (Left, 7/13/2022).    Medications:   Raj has a current medication list which includes the following prescription(s): b complex vitamins, buspirone, cholecalciferol (vitamin d3), diazepam, multivit,calc,min/fa/k1/lycop, multivit-min/folic/vit k/lycop, multivitamin, fish oil-omega-3 fatty acids, valsartan, and vitamin b complex.    Allergies:   Review of patient's allergies indicates:  No Known Allergies       OBJECTIVE         CMS Impairment/Limitation/Restriction for FOTO Vertigo Survey    Therapist reviewed FOTO scores for Alphonse Claude Mackey III on 11/25/2022.   FOTO documents entered into [employer] - see Media section.    Limitation Score: 52%              CERVICAL  (single inclinometer at top of head) AROM  (degrees/%) Pain/Dysfunction with Movement   Flexion 65    Extension 45    Right side bending 25    Left side bending 25    Right rotation 70 %    Left rotation 60%  Increased UT tightness     Palpation:   Patient tender with increased tone over bilateral  upper trapezial muscle, levator scapula, suboccipital muscle      Vestibular Testing  Visual Fields: normal  Horizontal tracking: normal  Vertical Tracking: normal  Diagonal tracking:   "normal  Vestibular Occular Reflex   Vertical: normal   Horizontal: normal  Convergence: normal  Saccades: neg    Vestibular:     Head impulse:  neg Nystagmus   Head shake:  neg nystagmus     Balance testing: Motion Sensitivity Testing  Double limb standing eyes open no sway 30"  NBOS: head nod: 3/1  NBOS: head shake: 3/10  NBOS: diagonal: 4/10  Floor taps: 3/10  Full turn: 4/10  D2 with gaze stab: 5/10 notable body sway   Trunk rot with head and eyes: 4/10  Fws WS with CL head turn: 3/10  Reverse step with head, eyes, trunk to look behind: 4/10    Still feels dizzy with all motions all day. When moving around or with eyes closed feels wobbly and like he's moving around. Feels like his body is moving off balance.       TREATMENT     Total Treatment time (time-based codes) separate from Evaluation: 15 minutes      Raj received the treatments listed below:    Head nod/ shake  SL dead lift  Squat with gaze stab  Prone cervical retraction  Qp cat/cow  D2 motion with weight    PATIENT EDUCATION AND HOME EXERCISES     Education provided:   - yes, HEP provided    Written Home Exercises Provided: yes. Exercises were reviewed and Raj was able to demonstrate them prior to the end of the session.  Raj demonstrated good  understanding of the education provided. See EMR under Patient Instructions for exercises provided during therapy sessions.    ASSESSMENT     Raj is a 32 y.o. male referred to outpatient Physical Therapy with a medical diagnosis of cervical vertigo, pt presents with signs and symptoms consistent with diagnosis along with 3PD. The patient presents with impairments which include impaired functional mobility, impaired balance, pain, decreased ROM, and orthopedic precautions. These impairments are limiting patient's ability to complete fitness routine, work related tasks, home chores, and community ambulation and levels without feeling dizzy and unsteady.     Pt prognosis is Good due to personal " factors and co-morbidities listed below.   Pt will benefit from skilled outpatient Physical Therapy to address the deficits stated above and in the chart below, provide pt/family education, and to maximize pt's level of independence.     Plan of care discussed with patient: Yes  Pt's spiritual, cultural and educational needs considered and patient is agreeable to the plan of care and goals as stated below:     Anticipated Barriers for therapy: none    Medical Necessity is demonstrated by the following  History  Co-morbidities and personal factors that may impact the plan of care Co-morbidities:   See medical hx    Personal Factors:   no deficits     low   Examination  Body Structures and Functions, activity limitations and participation restrictions that may impact the plan of care Body Regions:   head  neck    Body Systems:    ROM  strength  balance  gait  transfers  joint mobility, muscle tone, muscle length    Participation Restrictions:   See current level of function listed above     Activity limitations:   Learning and applying knowledge  no deficits    General Tasks and Commands  no deficits    Communication  no deficits    Mobility  lifting and carrying objects  walking    Self care  washing oneself (bathing, drying, washing hands)  caring for body parts (brushing teeth, shaving, grooming)    Domestic Life  shopping  cooking  doing house work (cleaning house, washing dishes, laundry)    Interactions/Relationships  no deficits    Life Areas  employment    Community and Social Life  community life  recreation and leisure         low   Clinical Presentation stable and uncomplicated low   Decision Making/ Complexity Score: low     Goals: Reviewed:11/25/2022      STG'S: 6 weeks  1. Patient independent in HEP of balance and habituation.  Exercises to be done Daily.  2. Decrease patient's subjective rating of dizziness to a 2 on 0-10 scale) with ADLs and home chores.  3. 4.Patient to have pain less than 2/10 in  upper trap region at worst, in order to improve QOL.  4. Decrease subjective rating of dizziness to less than 3/10 with rotation, bending, and turning quickly.  5.Patient to perform cervical AROM without s/s of dizziness and unsteadiness to, in order to improve available range of motion for ADL's.      LTG'S : 10 weeks  1. Patient able to ambulate in community safely, on varied terrain with head movement, without LOB or c/o dizziness.    2. Patient's subjective rating of dizziness will decreased to 0-2 on 0-10 scale at all times.  3. Patient able to perform fitness routine without LOB or c/o dizziness.  4. Patient to perform daily activities including home chores and work related tasks without increased symptoms.  5.Patient to increase cervical AROM  to WFL without feeling wobbly or unsteady, in order to improve available range of motion for ADL's.      PLAN     Plan of care Certification: 11/25/2022 to 1/30/2023.    Outpatient Physical Therapy 2 times weekly for 10 weeks to include the following interventions: Electrical Stimulation cervical, Manual Therapy, Neuromuscular Re-ed, Orthotic Management and Training, Patient Education, Self Care, Therapeutic Activities, Therapeutic Exercise, and canalith repositioning and dry needling  .     Gay Rubio, PT      I CERTIFY THE NEED FOR THESE SERVICES FURNISHED UNDER THIS PLAN OF TREATMENT AND WHILE UNDER MY CARE   Physician's comments:     Physician's Signature: ___________________________________________________

## 2022-12-08 ENCOUNTER — PATIENT MESSAGE (OUTPATIENT)
Dept: NEUROSURGERY | Facility: CLINIC | Age: 32
End: 2022-12-08
Payer: COMMERCIAL

## 2022-12-09 ENCOUNTER — CLINICAL SUPPORT (OUTPATIENT)
Dept: REHABILITATION | Facility: HOSPITAL | Age: 32
End: 2022-12-09
Payer: COMMERCIAL

## 2022-12-09 DIAGNOSIS — Z91.81 AT MODERATE RISK FOR FALL: Primary | ICD-10-CM

## 2022-12-09 PROCEDURE — 97140 MANUAL THERAPY 1/> REGIONS: CPT | Mod: PN

## 2022-12-09 PROCEDURE — 97110 THERAPEUTIC EXERCISES: CPT | Mod: PN

## 2022-12-09 PROCEDURE — 97112 NEUROMUSCULAR REEDUCATION: CPT | Mod: PN

## 2022-12-09 NOTE — PROGRESS NOTES
[facility]   Physical Therapy Treatment Note     Name: Alphonse Claude Mackey III  Clinic Number: [id number]    Therapy Diagnosis:   Encounter Diagnosis   Name Primary?    At moderate risk for fall Yes     Physician: Nino Sandoval MD    Visit Date: 12/9/2022    Physician Orders: PT Eval and Treat   Medical Diagnosis from Referral: Perc  Evaluation Date: 11/25/2022  Authorization Period Expiration: 11/11/2023  Plan of Care Expiration: 1/30/2023  Progress Note Due: 10th visit  Visit # / Visits authorized: 1/ 12   FOTO: 1/ 3     PTA Visit #: 0/5     Time In: 7:30am  Time Out: 8:15am  Total Billable Time: 45 minutes    SUBJECTIVE     Pt reports: feeling more dizziness lately especially with cervical mobility and musculature soreness   He was compliant with home exercise program.  Response to previous treatment: slightly increased s/s  Functional change: NA    Dizziness: 7/10  Location: left head      OBJECTIVE     Objective Measures updated at progress report unless specified.     Treatment     Raj received the treatments listed below:      Pt received therapeutic exercises to develop strength, endurance, ROM, flexibility, posture, and core stabilization for 15 minutes including:  Seated UT stretch   Seated LS stretch  Open books 1x10 B  Foam rolling      manual therapy techniques: Joint mobilizations, Manual traction, Soft tissue Mobilization, and dry needling were applied to the: cervical and thoracic region for 15 minutes, including:  PA mobs lower cervical spine to upper thoracic spine  CTJ mobs in prone   Gentle cervical distraction   Subocc release  ART in UT and LS  FDN: UT piston left only    neuromuscular re-education activities to improve: Balance, Coordination, Kinesthetic, Sense, Proprioception, and Posture for 15 minutes. The following activities were included:  Seated diaphragmatic breathing 1x10  Seated cervical rotation with step in midline 1x10, EO, EC 1x10 seated and  standing   Amb with depth perception  3 laps        Patient Education and Home Exercises     Home Exercises Provided and Patient Education Provided     Education provided:   - yes, VRT    Written Home Exercises Provided: yes. Exercises were reviewed and Raj was able to demonstrate them prior to the end of the session.  Raj demonstrated good  understanding of the education provided. See EMR under Patient Instructions for exercises provided during therapy sessions    ASSESSMENT     Pt tolerated session well he does cont to report high levels of unsteadiness and dizziness 7/10. His main complaint besides constant dizziness is left UT tightness that causes pain into left side of skull and extends into mid back. Stretches and mobility training performed. Pt encouraged to complete body scan for postural awareness and diaphragmatic breathing prior to initiating habituation training and balance. Pt instructed to limited head rotation habituation to horizontal only at this time. Depth perception training initiated during ambulation with only slight increase in s/s.     Raj Is progressing well towards his goals.   Pt prognosis is Good.     Pt will continue to benefit from skilled outpatient physical therapy to address the deficits listed in the problem list box on initial evaluation, provide pt/family education and to maximize pt's level of independence in the home and community environment.     Pt's spiritual, cultural and educational needs considered and pt agreeable to plan of care and goals.     Anticipated barriers to physical therapy: none    Goals:     STG'S: 6 weeks  1. Patient independent in HEP of balance and habituation.  Exercises to be done Daily.  2. Decrease patient's subjective rating of dizziness to a 2 on 0-10 scale) with ADLs and home chores.  3. 4.Patient to have pain less than 2/10 in upper trap region at worst, in order to improve QOL.  4. Decrease subjective rating of dizziness to less  than 3/10 with rotation, bending, and turning quickly.  5.Patient to perform cervical AROM without s/s of dizziness and unsteadiness to, in order to improve available range of motion for ADL's.        LTG'S  : 10 weeks  1. Patient able to ambulate in community safely, on varied terrain with head movement, without LOB or c/o dizziness.    2. Patient's subjective rating of dizziness will decreased to 0-2 on 0-10 scale at all times.  3. Patient able to perform fitness routine without LOB or c/o dizziness.  4. Patient to perform daily activities including home chores and work related tasks without increased symptoms.  5.Patient to increase cervical AROM  to WFL without feeling wobbly or unsteady, in order to improve available range of motion for ADL's.        PLAN      Plan of care Certification: 11/25/2022 to 1/30/2023.     Outpatient Physical Therapy 2 times weekly for 10 weeks to include the following interventions: Electrical Stimulation cervical, Manual Therapy, Neuromuscular Re-ed, Orthotic Management and Training, Patient Education, Self Care, Therapeutic Activities, Therapeutic Exercise, and canalith repositioning and dry needling  .   Gay Rubio, PT

## 2022-12-14 ENCOUNTER — TELEPHONE (OUTPATIENT)
Dept: NEUROSURGERY | Facility: CLINIC | Age: 32
End: 2022-12-14
Payer: COMMERCIAL

## 2022-12-14 DIAGNOSIS — Z98.1 STATUS POST CERVICAL SPINAL FUSION: Primary | ICD-10-CM

## 2022-12-28 ENCOUNTER — CLINICAL SUPPORT (OUTPATIENT)
Dept: REHABILITATION | Facility: HOSPITAL | Age: 32
End: 2022-12-28
Payer: COMMERCIAL

## 2022-12-28 DIAGNOSIS — Z91.81 AT MODERATE RISK FOR FALL: Primary | ICD-10-CM

## 2022-12-28 PROCEDURE — 97112 NEUROMUSCULAR REEDUCATION: CPT | Mod: PN

## 2022-12-28 PROCEDURE — 97140 MANUAL THERAPY 1/> REGIONS: CPT | Mod: PN

## 2022-12-28 NOTE — PROGRESS NOTES
[facility]   Physical Therapy Treatment Note     Name: Alphonse Claude Mackey III  Clinic Number: [id number]    Therapy Diagnosis:   Encounter Diagnosis   Name Primary?    At moderate risk for fall Yes     Physician: Nino Sandoval MD    Visit Date: 12/28/2022    Physician Orders: PT Eval and Treat   Medical Diagnosis from Referral: Perc  Evaluation Date: 11/25/2022  Authorization Period Expiration: 11/11/2023  Plan of Care Expiration: 1/30/2023  Progress Note Due: 10th visit  Visit # / Visits authorized: 3/ 12   FOTO: 1/ 3     PTA Visit #: 0/5     Time In: 9:55  Time Out: 10:30am  Total Billable Time: 35 minutes    SUBJECTIVE     Pt reports: feeling less dizziness at times. Remains tight in left UT with cervical rotation   He was compliant with home exercise program.  Response to previous treatment: slightly increased s/s  Functional change: NA    Dizziness: 4/10 pre and 5/10 post  Location: left head      OBJECTIVE     Objective Measures updated at progress report unless specified.     Treatment     Raj received the treatments listed below:      Pt received therapeutic exercises to develop strength, endurance, ROM, flexibility, posture, and core stabilization for 00 minutes including:  Seated UT stretch   Seated LS stretch  Open books 1x10 B  Foam rolling      manual therapy techniques: Joint mobilizations, Manual traction, Soft tissue Mobilization, and dry needling were applied to the: cervical and thoracic region for 10 minutes, including:  PA mobs lower cervical spine to upper thoracic spine  CTJ mobs in prone   Gentle cervical distraction     neuromuscular re-education activities to improve: Balance, Coordination, Kinesthetic, Sense, Proprioception, and Posture for 25 minutes. The following activities were included:  Seated diaphragmatic breathing 1x10  Seated cervical rotation with step in midline 1x10, EO, EC 1x10 seated and standing   Standing cervical rotation with mid  line target seek 1x10  Standing vertical head nod 1x10  Side lunge with ipsi head turn 1x10  STS with gaze stab 1x10  Squats holding 20# bag with gaze stab  Squat with OH press 10# ball 1x10  Deep squat with oh 15# BALL TOSS 1X10  SL dead lift 15# 1x10 B  Fwd step with reaching fwd with head down and up 1x10 B  180 deg tur leading with eyes 1x5 B  360 deg turn alternating 5x ea          Patient Education and Home Exercises     Home Exercises Provided and Patient Education Provided     Education provided:   - yes, VRT    Written Home Exercises Provided: yes. Exercises were reviewed and Raj was able to demonstrate them prior to the end of the session.  Raj demonstrated good  understanding of the education provided. See EMR under Patient Instructions for exercises provided during therapy sessions    ASSESSMENT     Pt tolerated session well with only slight increase in dizziness this session. Pt able to progress to more functional training with gaze stabilization or target seeking tasks with minimal increase in s/s. Pt encouraged to complete body scan for postural awareness and diaphragmatic breathing prior to initiating habituation training and balance.   Raj Is progressing well towards his goals.   Pt prognosis is Good.     Pt will continue to benefit from skilled outpatient physical therapy to address the deficits listed in the problem list box on initial evaluation, provide pt/family education and to maximize pt's level of independence in the home and community environment.     Pt's spiritual, cultural and educational needs considered and pt agreeable to plan of care and goals.     Anticipated barriers to physical therapy: none    Goals:     STG'S: 6 weeks  1. Patient independent in HEP of balance and habituation.  Exercises to be done Daily.  2. Decrease patient's subjective rating of dizziness to a 2 on 0-10 scale) with ADLs and home chores.  3. 4.Patient to have pain less than 2/10 in upper trap  region at worst, in order to improve QOL.  4. Decrease subjective rating of dizziness to less than 3/10 with rotation, bending, and turning quickly.  5.Patient to perform cervical AROM without s/s of dizziness and unsteadiness to, in order to improve available range of motion for ADL's.        LTG'S  : 10 weeks  1. Patient able to ambulate in community safely, on varied terrain with head movement, without LOB or c/o dizziness.    2. Patient's subjective rating of dizziness will decreased to 0-2 on 0-10 scale at all times.  3. Patient able to perform fitness routine without LOB or c/o dizziness.  4. Patient to perform daily activities including home chores and work related tasks without increased symptoms.  5.Patient to increase cervical AROM  to WFL without feeling wobbly or unsteady, in order to improve available range of motion for ADL's.        PLAN      Plan of care Certification: 11/25/2022 to 1/30/2023.     Outpatient Physical Therapy 2 times weekly for 10 weeks to include the following interventions: Electrical Stimulation cervical, Manual Therapy, Neuromuscular Re-ed, Orthotic Management and Training, Patient Education, Self Care, Therapeutic Activities, Therapeutic Exercise, and canalith repositioning and dry needling  .   Gay Rubio, PT

## 2023-01-01 NOTE — PROGRESS NOTES
2015-On call provider called about banana bag and normal saline for clarification since both were ordered continuous. Received orders to continue banana bag and discontinue normal saline. 2330-Per pharmacy banana bag is once daily. Pt refusing to have IVF infusing at this time and reported that she did not want to hear the machine beep while sleeping. 0615-Atttempted to reinforce dressing to lue but pt was in to much pain medicated as ordered will continue to monitor. Subjective:      Patient ID: Alphonse Claude Mackey III is a 32 y.o. male.    HPI:  Cervical Spine Pain (C-spine)    The patient is here today for evaluation of cervical disc disorder. He is referred to us by Dr. Barr.  States his symptoms started as dizziness and HTN, approximately 5 months ago.  After his annual, they did more testing including EKG (abnormal heart beat), Echo and stress test.   He eventually saw an ENT here for the dizziness and then Neurology.  Patient had MRI of his brain, thoracic and cervical spine.     Patient states a few months ago he was in therapy for what seemed a pinched nerve in the L arm.  At the time, it was localized to posterior neck, L side of neck and deltoid.  He also experienced tingling radiating down entire L arm to his fingers.  He has random pains in the R shoulder.     Currently he has no pain or numbness.  Still c/o dizziness.  Denies HA.  No acute bladder/bowel changes.  Patient denies falls, syncopal episodes, chronic h/o neck pain.  No previous neck surgery or injections.    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   If he is moving around or working out the dizziness improves.    The patient works out daily. He reported that he stopped heavy weights (above 70 lbs).   He has been doing 30 min jump rope, lifts and presses (usually up to 70 lbs).    In the past, he has tried Physical therapy, massage and chiro.         Objective:     Body mass index is 30.37 kg/m².  Vitals:    05/24/22 0846   BP: 131/80   Pulse: 72   Resp: 18            Neck:  None  Paraspinal tenderness   None  Paraspinal muscle spasms   None  Pain with flexion and extention   WNL  Range of motion shoulders   Neg Not tested Spurling's sign     Motor:   Right Right Left Left  Level Group   5  5  C5 Deltoid   5  5  C6 Bicep   5  5   Wrist extension    5  5  C7 Triceps   5  5   Wrist flexion   5  5  C8    5  5  T1 Interossei      Sensation:  NL Decreased (R/L/BL) Level  Sensation    [x]   C5 Lateral upper arm   [x]   C6 Thumb and index finger, lat forearm   [x]   C7 Middle finger   [x]   C8 Ring and little finger   [x]   T1 Medial arm      Reflex:  2+  Bicep tendon   2+  Brachioradialis   2+  Triceps tendon   Not present  Hinojosa's   none  Clonus   neg  Tinel's         Lab Results   Component Value Date    WBC 4.64 01/04/2022    HCT 48.3 01/04/2022     MRI Cervical:  FINDINGS:  There is straightening of the upper cervical spine.  No fracture. No marrow signal abnormality suspicious for an infiltrative process.  There is disc desiccation noted at the C3-4 through the C5-6 levels.  Mild disc space narrowing seen at C5-6.   There is equivocal slight increased signal seen within the cord at the C4-5 level secondary to myelopathy.  The craniocervical junction and visualized intracranial contents are unremarkable.  The adjacent soft tissue structures show no significant abnormalities.     C2-C3:  No significant central canal or neural foraminal narrowing.     C3-C4:  Central disc protrusion resulting in effacement of the thecal sac and at least mild effacement of the cord.  The AP dimension of the central canal at this level measures approximately 7 mm.  No neural foraminal canal narrowing.     C4-C5:  Prominent central disc protrusion resulting in effacement of the thecal sac and moderate to severe effacement the cord with the AP dimension of the central canal at this level measuring 5 mm.  The neural foraminal canals are patent.  There is equivocal slight increase in cord signal, which would be most consistent with myelopathy.     C5-C6:  Very minimal diffuse disc bulge resulting in very minimal effacement of ventral thecal sac.  No abutment of the cord.  No neural foraminal canal narrowing.   C6-C7:  No significant central canal or neural foraminal narrowing.     C7-T1:   No significant central canal or neural foraminal narrowing.     Impression:   1. Prominent disc protrusions at the  C3-4 and C4-5 level with possible mild myelopathic changes within the cervical cord at the C4-5 level secondary to discogenic changes.     INDEPENDENT INTERPRETATION OF TEST:  See above.    Assessment:     1. DDD (degenerative disc disease), cervical    2. Cervical disc disorder at C4-C5 level with myelopathy      Plan:     DDD (degenerative disc disease), cervical  -     CT Cervical Spine Without Contrast; Future; Expected date: 05/24/2022    Cervical disc disorder at C4-C5 level with myelopathy  -     Ambulatory referral/consult to Neurosurgery  -     CT Cervical Spine Without Contrast; Future; Expected date: 05/24/2022      Reviewed current imaging studies, pathology and treatment options with patient today.   Patient has degenerative findings and stenosis at C3/4 and worse at 4/5.  Will recommend CT of cervical spine for further evaluation of bony anatomy - needed for surgical planning.   He will follow-up with Dr. Fortune for preop discussion.  Please call with any changes.    I instructed patient to limit any lifting over 20 lbs at this time.   He needs to avoid adjustments at the chiro.         Yahir Zapata PA-C  [facility]        2023

## 2023-01-06 ENCOUNTER — TELEPHONE (OUTPATIENT)
Dept: NEUROSURGERY | Facility: CLINIC | Age: 33
End: 2023-01-06
Payer: COMMERCIAL

## 2023-01-06 NOTE — TELEPHONE ENCOUNTER
Spoke with pt informed pt that I spoke with radiology Gracie informed me that the mri auth can take anywhere bewtween 3-10 days pt is aware and vu I was able to hae pts mri r/s as well as his f/u appt.

## 2023-01-06 NOTE — TELEPHONE ENCOUNTER
----- Message from Masha Sewell sent at 1/6/2023 11:32 AM CST -----  Regarding: Appt-MRI  Contact: Patient  Type: Needs Medical Advice  Who Called:  Patient  Symptoms (please be specific):   How long has patient had these symptoms:    Pharmacy name and phone #:    Best Call Back Number: [phone]     Additional Information: Patient received a call regarding his insurance still pending for his MRI Monday. Please call to advise. Thanks!

## 2023-01-11 ENCOUNTER — CLINICAL SUPPORT (OUTPATIENT)
Dept: REHABILITATION | Facility: HOSPITAL | Age: 33
End: 2023-01-11
Payer: COMMERCIAL

## 2023-01-11 DIAGNOSIS — Z91.81 AT MODERATE RISK FOR FALL: Primary | ICD-10-CM

## 2023-01-11 PROCEDURE — 97112 NEUROMUSCULAR REEDUCATION: CPT | Mod: PN

## 2023-01-11 NOTE — PROGRESS NOTES
[facility]   Physical Therapy Treatment Note     Name: Alphonse Claude Mackey III  Clinic Number: [id number]    Therapy Diagnosis:   Encounter Diagnosis   Name Primary?    At moderate risk for fall Yes     Physician: Nino Sandoval MD    Visit Date: 1/11/2023    Physician Orders: PT Eval and Treat   Medical Diagnosis from Referral: Perc  Evaluation Date: 11/25/2022  Authorization Period Expiration: 11/11/2023  Plan of Care Expiration: 1/30/2023  Progress Note Due: 10th visit  Visit # / Visits authorized: 4/ 12   FOTO: 1/ 3     PTA Visit #: 0/5     Time In: 9:45  Time Out: 10:30am  Total Billable Time: 45 minutes    SUBJECTIVE     Pt reports: feeling less dizziness at times. Remains tight in left UT with cervical rotation   He was compliant with home exercise program.  Response to previous treatment: slightly increased s/s  Functional change: NA    Dizziness: 4/10 pre and 5/10 post  Location: left head      OBJECTIVE     Objective Measures updated at progress report unless specified.     Treatment     Raj received the treatments listed below:      Pt received therapeutic exercises to develop strength, endurance, ROM, flexibility, posture, and core stabilization for 00 minutes including:  Seated UT stretch   Seated LS stretch  Open books 1x10 B  Foam rolling      manual therapy techniques: Joint mobilizations, Manual traction, Soft tissue Mobilization, and dry needling were applied to the: cervical and thoracic region for 00 minutes, including:  PA mobs lower cervical spine to upper thoracic spine  CTJ mobs in prone   Gentle cervical distraction     neuromuscular re-education activities to improve: Balance, Coordination, Kinesthetic, Sense, Proprioception, and Posture for 45 minutes. The following activities were included:  Seated diaphragmatic breathing 1x10  standing cervical rotation with step in midline 1x10, EO, EC 1x10   Lateral lunge with target at midline on idalia  2x10  Standing vertical head nod 1x10  Side lunge with ipsi head turn 1x10  Squats holding 20# bag with gaze stab  Squat with OH press 10# ball 1x10  Deep squat with oh 15# BALL TOSS 1X10  SL dead lift 15# 1x10 B  Fwd step with reaching fwd with head down and up 1x10 B  180 deg tur leading with eyes 1x5 B  360 deg turn alternating 5x ea  High knee stepping with ipsi head turn 2 laps            Patient Education and Home Exercises     Home Exercises Provided and Patient Education Provided     Education provided:   - yes, VRT    Written Home Exercises Provided: yes. Exercises were reviewed and Raj was able to demonstrate them prior to the end of the session.  Raj demonstrated good  understanding of the education provided. See EMR under Patient Instructions for exercises provided during therapy sessions    ASSESSMENT     Pt tolerated session well increase in s/s during activity that quickly returns to baseline after ceasing motion. Pt able to work out with little increase in dizziness but does report more dizziness when walking outside. Pt able to progress to more functional training with gaze stabilization or target seeking tasks with minimal increase in s/s. Pt encouraged to complete body scan for postural awareness and diaphragmatic breathing prior to initiating habituation training and balance.   Raj Is progressing well towards his goals.   Pt prognosis is Good.     Pt will continue to benefit from skilled outpatient physical therapy to address the deficits listed in the problem list box on initial evaluation, provide pt/family education and to maximize pt's level of independence in the home and community environment.     Pt's spiritual, cultural and educational needs considered and pt agreeable to plan of care and goals.     Anticipated barriers to physical therapy: none    Goals:     STG'S: 6 weeks  1. Patient independent in HEP of balance and habituation.  Exercises to be done Daily.  2. Decrease  patient's subjective rating of dizziness to a 2 on 0-10 scale) with ADLs and home chores.  3. 4.Patient to have pain less than 2/10 in upper trap region at worst, in order to improve QOL.  4. Decrease subjective rating of dizziness to less than 3/10 with rotation, bending, and turning quickly.  5.Patient to perform cervical AROM without s/s of dizziness and unsteadiness to, in order to improve available range of motion for ADL's.        LTG'S  : 10 weeks  1. Patient able to ambulate in community safely, on varied terrain with head movement, without LOB or c/o dizziness.    2. Patient's subjective rating of dizziness will decreased to 0-2 on 0-10 scale at all times.  3. Patient able to perform fitness routine without LOB or c/o dizziness.  4. Patient to perform daily activities including home chores and work related tasks without increased symptoms.  5.Patient to increase cervical AROM  to WFL without feeling wobbly or unsteady, in order to improve available range of motion for ADL's.        PLAN      Plan of care Certification: 11/25/2022 to 1/30/2023.     Outpatient Physical Therapy 2 times weekly for 10 weeks to include the following interventions: Electrical Stimulation cervical, Manual Therapy, Neuromuscular Re-ed, Orthotic Management and Training, Patient Education, Self Care, Therapeutic Activities, Therapeutic Exercise, and canalith repositioning and dry needling  .   Gay Rubio, PT

## 2023-01-18 ENCOUNTER — CLINICAL SUPPORT (OUTPATIENT)
Dept: REHABILITATION | Facility: HOSPITAL | Age: 33
End: 2023-01-18
Payer: COMMERCIAL

## 2023-01-18 ENCOUNTER — LAB VISIT (OUTPATIENT)
Dept: LAB | Facility: HOSPITAL | Age: 33
End: 2023-01-18
Attending: INTERNAL MEDICINE
Payer: COMMERCIAL

## 2023-01-18 DIAGNOSIS — Z00.00 PREVENTATIVE HEALTH CARE: ICD-10-CM

## 2023-01-18 DIAGNOSIS — Z91.81 AT MODERATE RISK FOR FALL: Primary | ICD-10-CM

## 2023-01-18 LAB
ALBUMIN SERPL BCP-MCNC: 4.4 G/DL (ref 3.5–5.2)
ALP SERPL-CCNC: 55 U/L (ref 55–135)
ALT SERPL W/O P-5'-P-CCNC: 153 U/L (ref 10–44)
ANION GAP SERPL CALC-SCNC: 9 MMOL/L (ref 8–16)
AST SERPL-CCNC: 420 U/L (ref 10–40)
BASOPHILS # BLD AUTO: 0.03 K/UL (ref 0–0.2)
BASOPHILS NFR BLD: 0.7 % (ref 0–1.9)
BILIRUB SERPL-MCNC: 0.5 MG/DL (ref 0.1–1)
BUN SERPL-MCNC: 14 MG/DL (ref 6–20)
CALCIUM SERPL-MCNC: 10.4 MG/DL (ref 8.7–10.5)
CHLORIDE SERPL-SCNC: 101 MMOL/L (ref 95–110)
CHOLEST SERPL-MCNC: 181 MG/DL (ref 120–199)
CHOLEST/HDLC SERPL: 2.8 {RATIO} (ref 2–5)
CO2 SERPL-SCNC: 29 MMOL/L (ref 23–29)
CREAT SERPL-MCNC: 1.1 MG/DL (ref 0.5–1.4)
DIFFERENTIAL METHOD: NORMAL
EOSINOPHIL # BLD AUTO: 0.1 K/UL (ref 0–0.5)
EOSINOPHIL NFR BLD: 2.5 % (ref 0–8)
ERYTHROCYTE [DISTWIDTH] IN BLOOD BY AUTOMATED COUNT: 12.3 % (ref 11.5–14.5)
EST. GFR  (NO RACE VARIABLE): >60 ML/MIN/1.73 M^2
GLUCOSE SERPL-MCNC: 100 MG/DL (ref 70–110)
HCT VFR BLD AUTO: 47.3 % (ref 40–54)
HDLC SERPL-MCNC: 64 MG/DL (ref 40–75)
HDLC SERPL: 35.4 % (ref 20–50)
HGB BLD-MCNC: 15.6 G/DL (ref 14–18)
IMM GRANULOCYTES # BLD AUTO: 0.02 K/UL (ref 0–0.04)
IMM GRANULOCYTES NFR BLD AUTO: 0.5 % (ref 0–0.5)
LDLC SERPL CALC-MCNC: 109.2 MG/DL (ref 63–159)
LYMPHOCYTES # BLD AUTO: 1.5 K/UL (ref 1–4.8)
LYMPHOCYTES NFR BLD: 38.4 % (ref 18–48)
MCH RBC QN AUTO: 28.9 PG (ref 27–31)
MCHC RBC AUTO-ENTMCNC: 33 G/DL (ref 32–36)
MCV RBC AUTO: 88 FL (ref 82–98)
MONOCYTES # BLD AUTO: 0.5 K/UL (ref 0.3–1)
MONOCYTES NFR BLD: 11.2 % (ref 4–15)
NEUTROPHILS # BLD AUTO: 1.9 K/UL (ref 1.8–7.7)
NEUTROPHILS NFR BLD: 46.7 % (ref 38–73)
NONHDLC SERPL-MCNC: 117 MG/DL
NRBC BLD-RTO: 0 /100 WBC
PLATELET # BLD AUTO: 264 K/UL (ref 150–450)
PMV BLD AUTO: 10.2 FL (ref 9.2–12.9)
POTASSIUM SERPL-SCNC: 4.5 MMOL/L (ref 3.5–5.1)
PROT SERPL-MCNC: 8.2 G/DL (ref 6–8.4)
RBC # BLD AUTO: 5.39 M/UL (ref 4.6–6.2)
SODIUM SERPL-SCNC: 139 MMOL/L (ref 136–145)
TRIGL SERPL-MCNC: 39 MG/DL (ref 30–150)
TSH SERPL DL<=0.005 MIU/L-ACNC: 1.97 UIU/ML (ref 0.4–4)
WBC # BLD AUTO: 4.01 K/UL (ref 3.9–12.7)

## 2023-01-18 PROCEDURE — 84443 ASSAY THYROID STIM HORMONE: CPT | Performed by: INTERNAL MEDICINE

## 2023-01-18 PROCEDURE — 80061 LIPID PANEL: CPT | Performed by: INTERNAL MEDICINE

## 2023-01-18 PROCEDURE — 97110 THERAPEUTIC EXERCISES: CPT | Mod: PN

## 2023-01-18 PROCEDURE — 97140 MANUAL THERAPY 1/> REGIONS: CPT | Mod: PN

## 2023-01-18 PROCEDURE — 97112 NEUROMUSCULAR REEDUCATION: CPT | Mod: PN

## 2023-01-18 PROCEDURE — 80053 COMPREHEN METABOLIC PANEL: CPT | Performed by: INTERNAL MEDICINE

## 2023-01-18 PROCEDURE — 36415 COLL VENOUS BLD VENIPUNCTURE: CPT | Performed by: INTERNAL MEDICINE

## 2023-01-18 PROCEDURE — 85025 COMPLETE CBC W/AUTO DIFF WBC: CPT | Performed by: INTERNAL MEDICINE

## 2023-01-18 NOTE — PROGRESS NOTES
[facility]   Physical Therapy Treatment Note     Name: Alphonse Claude Mackey III  Clinic Number: [id number]    Therapy Diagnosis:   Encounter Diagnosis   Name Primary?    At moderate risk for fall Yes     Physician: Nino Sandoval MD    Visit Date: 1/18/2023    Physician Orders: PT Eval and Treat   Medical Diagnosis from Referral: Perc  Evaluation Date: 11/25/2022  Authorization Period Expiration: 11/11/2023  Plan of Care Expiration: 1/30/2023  Progress Note Due: 10th visit  Visit # / Visits authorized: 5/ 12   FOTO: 1/ 3     PTA Visit #: 0/5     Time In: 9:45  Time Out: 10:45am  Total Billable Time: 60 minutes    SUBJECTIVE     Pt reports:feeling 4/10 dizziness baseline with spikes to 6/10 at times. Remains tight in left UT with cervical rotation.   He was compliant with home exercise program.  Response to previous treatment: slightly increased s/s  Functional change: NA    Dizziness: 4/10 pre and 5/10 post  Location: left head      OBJECTIVE     Objective Measures updated at progress report unless specified.     Treatment     Raj received the treatments listed below:      Pt received therapeutic exercises to develop strength, endurance, ROM, flexibility, posture, and core stabilization for 10 minutes including:  Seated UT stretch   Seated LS stretch  Seated SCM stretch   Open books 1x10 B  Foam rolling  Cervical rotation iso hold 3x3 with AROM rotation       manual therapy techniques: Joint mobilizations, Manual traction, Soft tissue Mobilization, and dry needling were applied to the: cervical and thoracic region for 15 minutes, including:  PA mobs lower cervical spine to upper thoracic spine  CTJ mobs in prone   Gentle cervical distraction   Subocc release  ART in UT and LS  FDN: UT piston left only    neuromuscular re-education activities to improve: Balance, Coordination, Kinesthetic, Sense, Proprioception, and Posture for 30 minutes. The following activities were  included:  Seated diaphragmatic breathing 1x10  standing cervical rotation with step in midline 1x10, EO, EC 1x10   Lateral lunge with target at midline on idalia 2x10  Standing vertical head nod 1x10  Side lunge with ipsi head turn 1x10  Squats holding 20# bag with gaze stab  Squat with OH press 10# ball 1x10  Deep squat with oh 15# BALL TOSS 1x10  SL dead lift 15# 1x10 B            Patient Education and Home Exercises     Home Exercises Provided and Patient Education Provided     Education provided:   - yes, VRT    Written Home Exercises Provided: yes. Exercises were reviewed and Raj was able to demonstrate them prior to the end of the session.  Raj demonstrated good  understanding of the education provided. See EMR under Patient Instructions for exercises provided during therapy sessions    ASSESSMENT     Pt tolerated session well increase in s/s during activity that quickly returns to baseline after ceasing motion. Pt able to work out s/s with increase by 2 points typically. Pt able to progress to more functional training with gaze stabilization or target seeking tasks with minimal increase in s/s. Pt encouraged to complete body scan for postural awareness and diaphragmatic breathing prior to initiating habituation training and balance. Pt to complete MRI later this week.     Raj Is progressing well towards his goals.   Pt prognosis is Good.     Pt will continue to benefit from skilled outpatient physical therapy to address the deficits listed in the problem list box on initial evaluation, provide pt/family education and to maximize pt's level of independence in the home and community environment.     Pt's spiritual, cultural and educational needs considered and pt agreeable to plan of care and goals.     Anticipated barriers to physical therapy: none    Goals:     STG'S: 6 weeks  1. Patient independent in HEP of balance and habituation.  Exercises to be done Daily.MET  2. Decrease patient's  subjective rating of dizziness to a 2 on 0-10 scale) with ADLs and home chores.NOT MET 3/10 typically  3. 4.Patient to have pain less than 2/10 in upper trap region at worst, in order to improve QOL. MET  4. Decrease subjective rating of dizziness to less than 3/10 with rotation, bending, and turning quickly. MET  5.Patient to perform cervical AROM without s/s of dizziness and unsteadiness to, in order to improve available range of motion for ADL's.MET        LTG'S  : 10 weeks  1. Patient able to ambulate in community safely, on varied terrain with head movement, without LOB or c/o dizziness.    2. Patient's subjective rating of dizziness will decreased to 0-2 on 0-10 scale at all times.  3. Patient able to perform fitness routine without LOB or c/o dizziness.  4. Patient to perform daily activities including home chores and work related tasks without increased symptoms.  5.Patient to increase cervical AROM  to WFL without feeling wobbly or unsteady, in order to improve available range of motion for ADL's.        PLAN      Plan of care Certification: 11/25/2022 to 1/30/2023.     Outpatient Physical Therapy 2 times weekly for 10 weeks to include the following interventions: Electrical Stimulation cervical, Manual Therapy, Neuromuscular Re-ed, Orthotic Management and Training, Patient Education, Self Care, Therapeutic Activities, Therapeutic Exercise, and canalith repositioning and dry needling  .   Gay Rubio, PT

## 2023-01-20 ENCOUNTER — HOSPITAL ENCOUNTER (OUTPATIENT)
Dept: RADIOLOGY | Facility: HOSPITAL | Age: 33
Discharge: HOME | End: 2023-01-20
Attending: PHYSICIAN ASSISTANT
Payer: COMMERCIAL

## 2023-01-20 DIAGNOSIS — Z98.1 STATUS POST CERVICAL SPINAL FUSION: ICD-10-CM

## 2023-01-20 PROCEDURE — 25500020 PHARM REV CODE 255: Performed by: PHYSICIAN ASSISTANT

## 2023-01-20 PROCEDURE — 72156 MRI NECK SPINE W/O & W/DYE: CPT | Mod: 26,,, | Performed by: RADIOLOGY

## 2023-01-20 PROCEDURE — A9585 GADOBUTROL INJECTION: HCPCS | Performed by: PHYSICIAN ASSISTANT

## 2023-01-20 PROCEDURE — 72156 MRI NECK SPINE W/O & W/DYE: CPT | Mod: TC

## 2023-01-20 PROCEDURE — 72156 MRI CERVICAL SPINE W WO CONTRAST: ICD-10-PCS | Mod: 26,,, | Performed by: RADIOLOGY

## 2023-01-20 RX ORDER — GADOBUTROL 604.72 MG/ML
10 INJECTION INTRAVENOUS
Status: COMPLETED | OUTPATIENT
Start: 2023-01-20 | End: 2023-01-20

## 2023-01-20 RX ADMIN — GADOBUTROL 9 ML: 604.72 INJECTION INTRAVENOUS at 08:01

## 2023-01-26 ENCOUNTER — HOSPITAL ENCOUNTER (OUTPATIENT)
Dept: RADIOLOGY | Facility: HOSPITAL | Age: 33
Discharge: HOME | End: 2023-01-26
Attending: PHYSICIAN ASSISTANT
Payer: COMMERCIAL

## 2023-01-26 ENCOUNTER — TELEPHONE (OUTPATIENT)
Dept: NEUROSURGERY | Facility: CLINIC | Age: 33
End: 2023-01-26
Payer: COMMERCIAL

## 2023-01-26 ENCOUNTER — OFFICE VISIT (OUTPATIENT)
Dept: NEUROSURGERY | Facility: CLINIC | Age: 33
End: 2023-01-26
Payer: COMMERCIAL

## 2023-01-26 DIAGNOSIS — Z98.1 STATUS POST CERVICAL SPINAL FUSION: Primary | ICD-10-CM

## 2023-01-26 DIAGNOSIS — Z98.1 STATUS POST CERVICAL SPINAL FUSION: ICD-10-CM

## 2023-01-26 DIAGNOSIS — R42 DIZZINESS: ICD-10-CM

## 2023-01-26 PROCEDURE — 72125 CT NECK SPINE W/O DYE: CPT | Mod: TC

## 2023-01-26 PROCEDURE — 72125 CT NECK SPINE W/O DYE: CPT | Mod: 26,,, | Performed by: STUDENT IN AN ORGANIZED HEALTH CARE EDUCATION/TRAINING PROGRAM

## 2023-01-26 PROCEDURE — 99441 PR PHYSICIAN TELEPHONE EVALUATION 5-10 MIN: CPT | Mod: 95,,, | Performed by: PHYSICIAN ASSISTANT

## 2023-01-26 PROCEDURE — 99441 PR PHYSICIAN TELEPHONE EVALUATION 5-10 MIN: ICD-10-PCS | Mod: 95,,, | Performed by: PHYSICIAN ASSISTANT

## 2023-01-26 PROCEDURE — 72125 CT CERVICAL SPINE WITHOUT CONTRAST: ICD-10-PCS | Mod: 26,,, | Performed by: STUDENT IN AN ORGANIZED HEALTH CARE EDUCATION/TRAINING PROGRAM

## 2023-01-26 NOTE — PROGRESS NOTES
"Established Patient - Audio Only Telehealth Visit   The patient location is: Home  The chief complaint leading to consultation is: MRI follow-up  Visit type: Virtual visit with audio only (telephone)  Total time spent with patient: 10 min   The reason for the audio only service rather than synchronous audio and video virtual visit was related to technical difficulties or patient preference/necessity.   Each patient to whom I provide medical services by telemedicine is:  (1) informed of the relationship between the physician and patient and the respective role of any other health care provider with respect to management of the patient; and (2) notified that they may decline to receive medical services by telemedicine and may withdraw from such care at any time. Patient verbally consented to receive this service via voice-only telephone call.        HPI:   The patient is scheduled for an audio visit today to discuss recent MRI of cervical spine.   Patient reports that he does feel tightness in L side, shoulder and lower trap area. It is not constant.  He recently completed vestibular therapy to help with dizziness but this issue persists.  Patient states it is non-stop.  Describes it as "feeling wobbly."  He denies HA, nausea, vomiting, syncope, vision changes.     Assessment and plan:       MRI Cervical spine-  FINDINGS:  There has been a corpectomy of C4 with anterior fusion at C3-C4 and C4-C5. Straightening of the cervical lordosis.  Vertebral body height is normal.  Marrow signal is within normal limits. No abnormal enhancement patterns. Normal cord signal. Intervertebral disc levels are as follows:   C2-C3 disc: Tiny central protrusion.  Normal uncovertebral joints and facet joints.  No significant stenosis.  The dural canal measures 9 mm.   C3-C4 disc: Level of prior surgery.  The dural canal measures 9 mm.  No foraminal stenosis.   C4-C5 disc: Level of prior surgery.  Right paracentral protruding osteophyte " that slightly indents the ventral thecal sac with minimal cord deformity.  Normal cord signal.  The dural canal measures 8 mm.  No foraminal stenosis.   C5-C6 disc: Disc space height loss with a posterior disc bulge slightly more pronounced toward the left.  Anterior osteophytes.  Mild left-sided UVJ hypertrophy with normal facet joints.  Mild left lateral recess stenosis.  The dural canal measures 9 mm.   C6-C7 disc: Normal.   C7-T1 disc: Normal.     Impression:   1. Interval surgery at C3-C4 and C4-C5 with corpectomy of C4.  There is normal cord signal.  Minimal remodeling of the cord at C4-C5.  2. Mild asymmetric disc bulge toward the left at C5-C6 causing mild left lateral recess stenosis without definite nerve compression.  3. No abnormal enhancement.      - Discussed recent MRI of cervical spine. No acute changes.  - Pt will continue with at-home exercises (vestibular therapy).  - He is scheduled to follow-up with neurology next month.    He will have a CT cervical spine scheduled to assess fusion/hardware.  Patient will follow-up afterwards.                  This service was not originating from a related E/M service provided within the previous 7 days nor will  to an E/M service or procedure within the next 24 hours or my soonest available appointment.  Prevailing standard of care was able to be met in this audio-only visit.

## 2023-01-27 ENCOUNTER — OFFICE VISIT (OUTPATIENT)
Dept: NEUROLOGY | Facility: CLINIC | Age: 33
End: 2023-01-27
Payer: COMMERCIAL

## 2023-01-27 ENCOUNTER — PATIENT MESSAGE (OUTPATIENT)
Dept: NEUROSURGERY | Facility: CLINIC | Age: 33
End: 2023-01-27
Payer: COMMERCIAL

## 2023-01-27 VITALS
BODY MASS INDEX: 31.53 KG/M2 | HEIGHT: 70 IN | HEART RATE: 66 BPM | WEIGHT: 220.25 LBS | SYSTOLIC BLOOD PRESSURE: 135 MMHG | RESPIRATION RATE: 16 BRPM | DIASTOLIC BLOOD PRESSURE: 79 MMHG

## 2023-01-27 DIAGNOSIS — F41.0 PANIC DISORDER WITHOUT AGORAPHOBIA WITH MILD PANIC ATTACKS: ICD-10-CM

## 2023-01-27 DIAGNOSIS — R42 DIZZINESS AND GIDDINESS: ICD-10-CM

## 2023-01-27 DIAGNOSIS — R26.89 IMBALANCE: ICD-10-CM

## 2023-01-27 DIAGNOSIS — M50.021 CERVICAL DISC DISORDER AT C4-C5 LEVEL WITH MYELOPATHY: ICD-10-CM

## 2023-01-27 DIAGNOSIS — M54.12 CERVICAL RADICULOPATHY: ICD-10-CM

## 2023-01-27 DIAGNOSIS — H81.8X9 PERSISTENT POSTURAL-PERCEPTUAL DIZZINESS: Primary | ICD-10-CM

## 2023-01-27 PROCEDURE — 3078F PR MOST RECENT DIASTOLIC BLOOD PRESSURE < 80 MM HG: ICD-10-PCS | Mod: CPTII,S$GLB,, | Performed by: NURSE PRACTITIONER

## 2023-01-27 PROCEDURE — 1160F RVW MEDS BY RX/DR IN RCRD: CPT | Mod: CPTII,S$GLB,, | Performed by: NURSE PRACTITIONER

## 2023-01-27 PROCEDURE — 3008F BODY MASS INDEX DOCD: CPT | Mod: CPTII,S$GLB,, | Performed by: NURSE PRACTITIONER

## 2023-01-27 PROCEDURE — 99999 PR PBB SHADOW E&M-EST. PATIENT-LVL IV: ICD-10-PCS | Mod: PBBFAC,,, | Performed by: NURSE PRACTITIONER

## 2023-01-27 PROCEDURE — 99214 OFFICE O/P EST MOD 30 MIN: CPT | Mod: S$GLB,,, | Performed by: NURSE PRACTITIONER

## 2023-01-27 PROCEDURE — 3078F DIAST BP <80 MM HG: CPT | Mod: CPTII,S$GLB,, | Performed by: NURSE PRACTITIONER

## 2023-01-27 PROCEDURE — 1160F PR REVIEW ALL MEDS BY PRESCRIBER/CLIN PHARMACIST DOCUMENTED: ICD-10-PCS | Mod: CPTII,S$GLB,, | Performed by: NURSE PRACTITIONER

## 2023-01-27 PROCEDURE — 1159F MED LIST DOCD IN RCRD: CPT | Mod: CPTII,S$GLB,, | Performed by: NURSE PRACTITIONER

## 2023-01-27 PROCEDURE — 3008F PR BODY MASS INDEX (BMI) DOCUMENTED: ICD-10-PCS | Mod: CPTII,S$GLB,, | Performed by: NURSE PRACTITIONER

## 2023-01-27 PROCEDURE — 3075F SYST BP GE 130 - 139MM HG: CPT | Mod: CPTII,S$GLB,, | Performed by: NURSE PRACTITIONER

## 2023-01-27 PROCEDURE — 99999 PR PBB SHADOW E&M-EST. PATIENT-LVL IV: CPT | Mod: PBBFAC,,, | Performed by: NURSE PRACTITIONER

## 2023-01-27 PROCEDURE — 99214 PR OFFICE/OUTPT VISIT, EST, LEVL IV, 30-39 MIN: ICD-10-PCS | Mod: S$GLB,,, | Performed by: NURSE PRACTITIONER

## 2023-01-27 PROCEDURE — 3075F PR MOST RECENT SYSTOLIC BLOOD PRESS GE 130-139MM HG: ICD-10-PCS | Mod: CPTII,S$GLB,, | Performed by: NURSE PRACTITIONER

## 2023-01-27 PROCEDURE — 1159F PR MEDICATION LIST DOCUMENTED IN MEDICAL RECORD: ICD-10-PCS | Mod: CPTII,S$GLB,, | Performed by: NURSE PRACTITIONER

## 2023-01-27 RX ORDER — PROPRANOLOL HYDROCHLORIDE 80 MG/1
80 CAPSULE, EXTENDED RELEASE ORAL EVERY MORNING
COMMUNITY
Start: 2023-01-20

## 2023-01-27 NOTE — PROGRESS NOTES
"Subjective:       Patient ID: Alphonse Claude Mackey III is a 32 y.o. male.    Chief Complaint: Results, PPPD, and Follow-up            HPI       BACKGROUND HISTORY       The patient was referred by Dr. Sandoval for "dizziness".     The patient reports longstanding history of feeling off balance that became prominent in the last 3 months (). Denies spinning or lightheadedness. Describes his symptom as feeling off balance while walking especially when he moves fast (not his head). The feeling comes multiple times a day, not with every time he walks. Sitting and stopping alleviates the feeling in addition to talking and being mentally engaged. No associated neurological symptoms. No weakness, numbness, loss of hearing or vision, tremors, trouble swallowing or double vision. Endorses occasional mild headaches that respond to Tylenol.  No trauma. Has significant history of ROZINA. ENT evaluation and Cardiology evaluation were normal. On 01- CBC, CMP, TFT NL. On 10- EKG, TTE and Holter NL. On 03- Audiology evaluation was NL. Ordered  Brain MRI WWO R/O CVD and Demyelination and CTA H/N R/O VBI and IC/EC dissection or malformations. On 04- CTA H/N NL , Brain MRI CC Splenial T2 signal. Denies alcohol intake. Ordered B Complex . On 04- B Complex with HC, MMA Unremarkable. Continues to have perceptual postural dizziness that is alleviated by being busy and engaging.  He is status post Discectomy spine cervical anterior approach with fusion Left dissection on 07-. Patient recovering well. He reports despite procedure he continues to have perceptual postural dizziness, he reports that he plans to visit ENT because recently he has began to have bilateral tinnitus. Patient is being followed by Neurosurgery and will follow up again in 3 months. No new neurological complaints. Noted. Plan to repeat MRI Brain WWO. Patient verbalized understanding of the plan of care.           INTERVAL " HISTORY 01-:    Patient present for follow up for PPPD and MRI Results. Patient doing well. He reports his dizziness complaint is ongoing but he has been able to better manage symptoms. Patient reports that he goes to [facility] for counseling and management of ROZINA. Patient also takes Propranolol 80 mg 24 hr capsule that is beneficial. Patient completed Vestibular therapy recently and he continues exercises taught at home.  MRI Results discussed 11- MRI Brain WWO: Normal Brain MRI. Patient verbalized understanding.     Patient continues to follow up with Neurosurgery, no new new neurological complaints noted.     Review of Systems   Constitutional:  Negative for appetite change and fatigue.   HENT:  Negative for hearing loss and tinnitus.    Eyes:  Negative for photophobia and visual disturbance.   Respiratory:  Negative for apnea and shortness of breath.    Cardiovascular:  Negative for chest pain and palpitations.   Gastrointestinal:  Negative for nausea and vomiting.   Endocrine: Negative for cold intolerance and heat intolerance.   Genitourinary:  Negative for difficulty urinating and urgency.   Musculoskeletal:  Negative for arthralgias, back pain, gait problem, joint swelling, myalgias, neck pain and neck stiffness.   Skin:  Negative for color change and rash.   Allergic/Immunologic: Negative for environmental allergies and immunocompromised state.   Neurological:  Positive for light-headedness. Negative for dizziness, tremors, seizures, syncope, facial asymmetry, speech difficulty, weakness, numbness and headaches.   Hematological:  Negative for adenopathy. Does not bruise/bleed easily.   Psychiatric/Behavioral:  Negative for agitation, behavioral problems, confusion, decreased concentration, dysphoric mood, hallucinations, self-injury, sleep disturbance and suicidal ideas. The patient is nervous/anxious. The patient is not hyperactive.                Current Outpatient Medications:      cholecalciferol, vitamin D3, (VITAMIN D3) 50 mcg (2,000 unit) Cap capsule, Take by mouth once daily., Disp: , Rfl:     multivit,calc,min/FA/K1/lycop (ONE-A-DAY MEN'S COMPLETE ORAL), Take by mouth., Disp: , Rfl:     propranoloL (INDERAL LA) 80 MG 24 hr capsule, Take 80 mg by mouth every morning., Disp: , Rfl:     VITAMIN B COMPLEX ORAL, Take 1 tablet by mouth once daily., Disp: , Rfl:   Past Medical History:   Diagnosis Date    Anxiety     Cervical radiculopathy 7/14/2022    Primary hypertension 1/6/2022     Past Surgical History:   Procedure Laterality Date    ADENOIDECTOMY      ANTERIOR CERVICAL DISCECTOMY W/ FUSION Left 7/13/2022    Procedure: DISCECTOMY, SPINE, CERVICAL, ANTERIOR APPROACH, WITH FUSION;  Surgeon: Yash Fortune MD;  Location: [facility] OR;  Service: Neurosurgery;  Laterality: Left;  ACDF vs Corpectomy C3-5  Will need Dr. Nuno    DISSECTION OF NECK Left 7/13/2022    Procedure: DISSECTION, NECK;  Surgeon: Westley Nuno MD;  Location: [facility] OR;  Service: ENT;  Laterality: Left;  ACDF vs Corpectomy C3-5    TONSILLECTOMY       Social History     Socioeconomic History    Marital status: Single   Tobacco Use    Smoking status: Never    Smokeless tobacco: Never   Substance and Sexual Activity    Alcohol use: Never    Drug use: Never    Sexual activity: Yes     Partners: Female   Social History Narrative    No smokers or pets in household.             Past/Current Medical/Surgical History, Past/Current Social History, Past/Current Family History and Past/Current Medications were reviewed in detail.        Objective:           VITAL SIGNS WERE REVIEWED      GENERAL APPEARANCE:     The patient looks comfortable.    BMI 30.40    No signs of respiratory distress.    Normal breathing pattern.    No dysmorphic features    Normal eye contact.     GENERAL MEDICAL EXAM:    HEENT:  Head is atraumatic normocephalic. Fundoscopic (Ophthalmoscopic) exam showed no disc edema.      Neck and Axillae: No JVD. No visible  lesions.    Cardiopulmonary: No cyanosis. No tachypnea. Normal respiratory effort.    Gastrointestinal/Urogenital:  No jaundice. No stomas or lesions. No visible hernias. No catheters.     Skin, Hair and Nails: No pathognonomic skin rash. No neurofibromatosis. No visible lesions.No stigmata of autoimmune disease. No clubbing.    Limbs: No varicose veins. No visible swelling.    Muskoskeletal: No visible deformities.No visible lesions.           Neurologic Exam     Mental Status   Oriented to person, place, and time.   Follows 3 step commands.   Attention: normal. Concentration: normal.   Speech: speech is normal   Level of consciousness: alert  Knowledge: good.   Able to name object. Able to repeat. Normal comprehension.     Cranial Nerves   Cranial nerves II through XII intact.     CN II   Visual fields full to confrontation.   Visual acuity: normal  Right visual field deficit: none  Left visual field deficit: none     CN III, IV, VI   Pupils are equal, round, and reactive to light.  Extraocular motions are normal.   Right pupil: Size: 2 mm. Shape: regular. Reactivity: brisk. Consensual response: intact. Accommodation: intact.   Left pupil: Size: 2 mm. Shape: regular. Reactivity: brisk. Consensual response: intact. Accommodation: intact.   CN III: no CN III palsy  CN VI: no CN VI palsy  Nystagmus: none   Diplopia: none  Ophthalmoparesis: none  Upgaze: normal  Downgaze: normal  Conjugate gaze: present  Vestibulo-ocular reflex: present    CN V   Facial sensation intact.   Right facial sensation deficit: none  Left facial sensation deficit: none    CN VII   Facial expression full, symmetric.   Right facial weakness: none  Left facial weakness: none    CN VIII   CN VIII normal.   Hearing: intact    CN IX, X   CN IX normal.   CN X normal.   Palate: symmetric    CN XI   CN XI normal.   Right sternocleidomastoid strength: normal  Left sternocleidomastoid strength: normal  Right trapezius strength: normal  Left trapezius  strength: normal    CN XII   CN XII normal.   Tongue: not atrophic  Fasciculations: absent  Tongue deviation: none    Motor Exam   Muscle bulk: normal  Overall muscle tone: normal  Right arm tone: normal  Left arm tone: normal  Right arm pronator drift: absent  Left arm pronator drift: absent  Right leg tone: normal  Left leg tone: normal    Strength   Strength 5/5 throughout.   Right neck flexion: 5/5  Left neck flexion: 5/5  Right neck extension: 5/5  Left neck extension: 5/5  Right deltoid: 5/5  Left deltoid: 5/5  Right biceps: 5/5  Left biceps: 5/5  Right triceps: 5/5  Left triceps: 5/5  Right wrist flexion: 5/5  Left wrist flexion: 5/5  Right wrist extension: 5/5  Left wrist extension: 5/5  Right interossei: 5/5  Left interossei: 5/5  Right iliopsoas: 5/5  Left iliopsoas: 5/5  Right quadriceps: 5/5  Left quadriceps: 5/5  Right hamstrin/5  Left hamstrin/5  Right glutei: 5/5  Left glutei: 5/5  Right anterior tibial: 5/5  Left anterior tibial: 5/5  Right posterior tibial: 5/5  Left posterior tibial: 5/5  Right peroneal: 5/5  Left peroneal: 5/5  Right gastroc: 5/5  Left gastroc: 5/5    Sensory Exam   Light touch normal.   Right arm light touch: normal  Left arm light touch: normal  Right leg light touch: normal  Left leg light touch: normal  Vibration normal.   Right arm vibration: normal  Left arm vibration: normal  Right leg vibration: normal  Left leg vibration: normal  Proprioception normal.   Right arm proprioception: normal  Left arm proprioception: normal  Right leg proprioception: normal  Left leg proprioception: normal  Pinprick normal.   Right arm pinprick: normal  Left arm pinprick: normal  Right leg pinprick: normal  Left leg pinprick: normal  Graphesthesia: normal  Stereognosis: normal    Gait, Coordination, and Reflexes     Gait  Gait: normal    Coordination   Romberg: negative  Finger to nose coordination: normal  Heel to shin coordination: normal  Tandem walking coordination:  normal    Tremor   Resting tremor: absent  Intention tremor: absent  Action tremor: absent    Reflexes   Right brachioradialis: 2+  Left brachioradialis: 2+  Right biceps: 2+  Left biceps: 2+  Right triceps: 2+  Left triceps: 2+  Right patellar: 2+  Left patellar: 2+  Right achilles: 2+  Left achilles: 2+  Right plantar: normal  Left plantar: normal  Right Hinojosa: absent  Left Hinojosa: absent  Right ankle clonus: absent  Left ankle clonus: absent  Right pendular knee jerk: absent  Left pendular knee jerk: absent    Lab Results   Component Value Date    WBC 4.01 01/18/2023    HGB 15.6 01/18/2023    HCT 47.3 01/18/2023    MCV 88 01/18/2023     01/18/2023     Sodium   Date Value Ref Range Status   01/18/2023 139 136 - 145 mmol/L Final     Potassium   Date Value Ref Range Status   01/18/2023 4.5 3.5 - 5.1 mmol/L Final     Chloride   Date Value Ref Range Status   01/18/2023 101 95 - 110 mmol/L Final     CO2   Date Value Ref Range Status   01/18/2023 29 23 - 29 mmol/L Final     Glucose   Date Value Ref Range Status   01/18/2023 100 70 - 110 mg/dL Final     BUN   Date Value Ref Range Status   01/18/2023 14 6 - 20 mg/dL Final     Creatinine   Date Value Ref Range Status   01/18/2023 1.1 0.5 - 1.4 mg/dL Final     Calcium   Date Value Ref Range Status   01/18/2023 10.4 8.7 - 10.5 mg/dL Final     Total Protein   Date Value Ref Range Status   01/18/2023 8.2 6.0 - 8.4 g/dL Final     Albumin   Date Value Ref Range Status   01/18/2023 4.4 3.5 - 5.2 g/dL Final     Total Bilirubin   Date Value Ref Range Status   01/18/2023 0.5 0.1 - 1.0 mg/dL Final     Comment:     For infants and newborns, interpretation of results should be based  on gestational age, weight and in agreement with clinical  observations.    Premature Infant recommended reference ranges:  Up to 24 hours.............<8.0 mg/dL  Up to 48 hours............<12.0 mg/dL  3-5 days..................<15.0 mg/dL  6-29 days.................<15.0 mg/dL       Alkaline  Phosphatase   Date Value Ref Range Status   01/18/2023 55 55 - 135 U/L Final     AST   Date Value Ref Range Status   01/18/2023 420 (H) 10 - 40 U/L Final     ALT   Date Value Ref Range Status   01/18/2023 153 (H) 10 - 44 U/L Final     Anion Gap   Date Value Ref Range Status   01/18/2023 9 8 - 16 mmol/L Final     eGFR if    Date Value Ref Range Status   06/07/2022 >60.0 >60 mL/min/1.73 m^2 Final     eGFR if non    Date Value Ref Range Status   06/07/2022 >60.0 >60 mL/min/1.73 m^2 Final     Comment:     Calculation used to obtain the estimated glomerular filtration  rate (eGFR) is the CKD-EPI equation.        Lab Results   Component Value Date    [id number] 04/29/2022     Lab Results   Component Value Date    TSH 1.969 01/18/2023 01-    CBC, CMP, TFT NL    11-    MRI Brain WWO:     Normal Brain MRI    0-    EKG, TTE and Holter NL     03-    Audiology evaluation was NL.      04-    CTA H/N NL       04-    Brain MRI CC Splenial T2 signal (Incidental)             04-      B Complex with HC, MMA Unremarkable   B1 (Thiamine)   B2 (Riboflavin)  B3 (Niacin)  B4-Not essential  B5 (Pantothenic Acid)  B6 (Pyridoxine)  B7 (Biotin)  B8-Not essential  B9 (Folate)  B10-Not essential  B11-Not essential   B12 (Cyanocobalamin)        05-    T-Spine MRI WWO NL    C-Spine WWO Severe IVDP C4-C5 with myelopathic changes at C4-C5           Reviewed the neuroimaging independently       Assessment:       1. Persistent postural-perceptual dizziness    2. Panic disorder without agoraphobia with mild panic attacks    3. Cervical radiculopathy    4. Dizziness and giddiness    5. Imbalance    6. Cervical disc disorder at C4-C5 level with myelopathy            Plan:         PERCEPTUAL PAROXYSMAL IMBALANCE, PERSISTENT POSTURAL-PERCEPTUAL DIZZINESS (PPPD)    Continue CBT and ROZINA management.    Continue Propranolol 80 mg po daily 24 hr capsule (managed by  Psych at [facility] )    Continue Home Vestibular Therapy     INCIDENTAL SPLENIUM T2 SIGNAL CHANGE     Repeat Brain  MRI WWO normal no further testing    STATUS POST CERVICAL FUSION    Follow up with Neurosurgery as directed                MEDICAL/SURGICAL COMORBIDITIES     All relevant medical comorbidities noted and managed by primary care physician and medical care team.          HEALTHY LIFESTYLE AND PREVENTATIVE CARE    The patient to adhere to the age-appropriate health maintenance guidelines including screening tests and vaccinations. The patient to adhere to  healthy lifestyle, optimal weight, exercise, healthy diet, good sleep hygiene and avoiding drugs including smoking, alcohol and recreational drugs.          RTC prn      Jones Hector, MSN NP      Collaborating Provider: Rafael Barr MD, FAAN Neurologist/Epileptologist

## 2023-02-03 ENCOUNTER — OFFICE VISIT (OUTPATIENT)
Dept: NEUROSURGERY | Facility: CLINIC | Age: 33
End: 2023-02-03
Payer: COMMERCIAL

## 2023-02-03 DIAGNOSIS — R42 DIZZINESS: ICD-10-CM

## 2023-02-03 DIAGNOSIS — Z98.1 HISTORY OF FUSION OF CERVICAL SPINE: Primary | ICD-10-CM

## 2023-02-03 PROCEDURE — 99441 PR PHYSICIAN TELEPHONE EVALUATION 5-10 MIN: ICD-10-PCS | Mod: 95,,, | Performed by: PHYSICIAN ASSISTANT

## 2023-02-03 PROCEDURE — 99441 PR PHYSICIAN TELEPHONE EVALUATION 5-10 MIN: CPT | Mod: 95,,, | Performed by: PHYSICIAN ASSISTANT

## 2023-02-03 NOTE — PROGRESS NOTES
Established Patient - Audio Only Telehealth Visit   The patient location is: Work  The chief complaint leading to consultation is: CT follow-up  Visit type: Virtual visit with audio only (telephone)  Total time spent with patient: 5 minutes     The reason for the audio only service rather than synchronous audio and video virtual visit was related to technical difficulties or patient preference/necessity.   Each patient to whom I provide medical services by telemedicine is:  (1) informed of the relationship between the physician and patient and the respective role of any other health care provider with respect to management of the patient; and (2) notified that they may decline to receive medical services by telemedicine and may withdraw from such care at any time. Patient verbally consented to receive this service via voice-only telephone call.      HPI:  The patient was scheduled for an audio visit today to discuss CT Cervical spine results.   Patient is nearly 7 months out from his surgery.   He wanted to get this study to assess his fusion status and look for any changes.    CT findings:  FINDINGS:  Prior C3-C5 anterior fusion with corpectomy device at C4.  Fusion hardware appears stable in comparison the prior of 09/29/2022.  Otherwise the vertebral bodies are normal in height and morphology without evidence of fracture or osseous destructive process.  Normal sagittal alignment is preserved.   In comparison to the prior CT of the cervical spine of 09/29/2022 the previously noted degenerative changes appear stable.  At C5-6 there is disc space height loss and a posterior disc osteophyte complex slightly asymmetric towards the left.  Borderline mild spinal canal stenosis at this level.   No neural foraminal narrowing definitely identified.   Limited evaluation of the intraspinal contents demonstrates no hematoma or mass.Paraspinal soft tissues exhibit no acute abnormalities.     Impression:   No fracture or  traumatic malalignment of the cervical spine.   Surgical change and degenerative change appear approximately stable in comparison to the prior of 09/29/2022      Assessment/Plan:  Discussed CT findings with the patient. Informed him we do not see any acute or surgical changes or critical stenosis.  Stable findings.  Follow-up PRN.   Please reach out with any changes.         This service was not originating from a related E/M service provided within the previous 7 days nor will  to an E/M service or procedure within the next 24 hours or my soonest available appointment.  Prevailing standard of care was able to be met in this audio-only visit.

## 2024-09-09 ENCOUNTER — OFFICE VISIT (OUTPATIENT)
Dept: OTOLARYNGOLOGY | Facility: CLINIC | Age: 34
End: 2024-09-09
Payer: COMMERCIAL

## 2024-09-09 VITALS — HEIGHT: 70 IN | BODY MASS INDEX: 31.6 KG/M2

## 2024-09-09 DIAGNOSIS — H61.23 BILATERAL IMPACTED CERUMEN: Primary | ICD-10-CM

## 2024-09-09 PROCEDURE — 1159F MED LIST DOCD IN RCRD: CPT | Mod: CPTII,S$GLB,, | Performed by: STUDENT IN AN ORGANIZED HEALTH CARE EDUCATION/TRAINING PROGRAM

## 2024-09-09 PROCEDURE — 99999 PR PBB SHADOW E&M-EST. PATIENT-LVL II: CPT | Mod: PBBFAC,,, | Performed by: STUDENT IN AN ORGANIZED HEALTH CARE EDUCATION/TRAINING PROGRAM

## 2024-09-09 PROCEDURE — 3008F BODY MASS INDEX DOCD: CPT | Mod: CPTII,S$GLB,, | Performed by: STUDENT IN AN ORGANIZED HEALTH CARE EDUCATION/TRAINING PROGRAM

## 2024-09-09 PROCEDURE — 99213 OFFICE O/P EST LOW 20 MIN: CPT | Mod: S$GLB,,, | Performed by: STUDENT IN AN ORGANIZED HEALTH CARE EDUCATION/TRAINING PROGRAM

## 2024-09-09 RX ORDER — ESCITALOPRAM OXALATE 10 MG/1
10 TABLET ORAL
COMMUNITY

## 2024-09-09 RX ORDER — AMLODIPINE BESYLATE 5 MG/1
5 TABLET ORAL EVERY MORNING
COMMUNITY

## 2024-09-09 NOTE — PROGRESS NOTES
"REFERRING PROVIDER  No referring provider defined for this encounter.  Subjective:   Patient: Alphonse Claude Mackey III [id number], :1990   Visit date:2024 3:27 PM    Chief Complaint:  Cerumen Impaction (L ear is clogged)        HPI:     Alphonse Claude Mackey III is a 34 y.o. male whom I am asked to see for evaluation of progressively worsening hearing loss in both ears for the past few weeks.   Raj rates the severity as moderate.  No exacerbating or relieving factors.  There is no drainage from the ears.      His meds, allergies, medical, surgical, social & family histories were reviewed & updated:  -     He has a current medication list which includes the following prescription(s): cholecalciferol (vitamin d3), multivit,calc,min/fa/k1/lycop, amlodipine, escitalopram oxalate, propranolol, and vitamin b complex.  -     He  has a past medical history of Anxiety, Cervical radiculopathy (2022), and Primary hypertension (2022).   -     He does not have any pertinent problems on file.   -     He  has a past surgical history that includes Adenoidectomy; Tonsillectomy; Anterior cervical discectomy w/ fusion (Left, 2022); and Dissection of neck (Left, 2022).  -     He  reports that he has never smoked. He has never used smokeless tobacco. He reports that he does not drink alcohol and does not use drugs.  -     His family history includes Cancer in his paternal grandfather; Hypertension in his mother; No Known Problems in his sister; Obesity in his paternal grandmother.  -     He has No Known Allergies.      Review of Systems:  -     Allergic/Immunologic: has No Known Allergies..  -     Constitutional: Current temp:          Objective:     Physical Exam:  Vitals:  Ht 5' 10" (1.778 m)   BMI 31.60 kg/m²   Communication:  Able to communicate, no hoarseness.  Head & Face:  Normocephalic, atraumatic, no sinus tenderness.  Eyes:  Extraocular motions intact.  Ears:  Otoscopy of external auditory " canals reveals impaction of bilateral ear canals.  With the patient in the supine position, we used the operating microscope to examine both ears with the appropriate sized ear speculum.  A variety of sterile, micro-instruments were utilized to remove the cerumen atraumatically from the impacted ear(s).   After removal, the ears were reexamined-  Right Ear:  No mass/lesion of auricle. The external auditory canals is without erythema or discharge. Pneumatic otoscopy of the tympanic membrane revealed no perforation, with no fluid in middle ear. Clinical speech reception thresholds grossly normal.  Left Ear:  No mass/lesion of auricle. The external auditory canals is without erythema or discharge. Pneumatic otoscopy of the tympanic membrane revealed no perforation, with no fluid in middle ear. Clinical speech reception thresholds grossly normal  Nose:  No masses/lesions of external nose, nasal mucosa, septum, and turbinates were within normal limits.  Mouth:  No mass/lesion of lips, teeth, gums, hard/soft palate, tongue, tonsils, or oropharynx.  Neck & Lymphatics:  No cervical lymphadenopathy, no neck mass/crepitus/ asymmetry, trachea is midline, no thyroid enlargement/tenderness/mass.  Neuro/Psych: Alert with normal mood and affect.   Respiration/Chest:  Symmetric expansion during respiration, normal respiratory effort.  Skin:  Warm and intact.    Assessment & Plan:       -     Cerumen Impaction - Raj has cerumen impaction.  We discussed preventative measures and treatment options.  Q-tips must be avoided, instead the ears can be cleaned with OTC ear rinses (or a mixture of alcohol & vinegar in equal parts).   For hard wax, Raj may place mineral oil/baby oil in the ear with a cotton ball at night and remove in the shower.  This will assist in softening the wax and allow it to drain out on its own. If the cerumen impacts the ear canal and causes hearing loss or infection he needs to follow-up in the clinic  for treatment and cleaning.

## 2024-11-05 ENCOUNTER — PATIENT MESSAGE (OUTPATIENT)
Dept: RESEARCH | Facility: HOSPITAL | Age: 34
End: 2024-11-05
Payer: COMMERCIAL

## 2024-12-09 ENCOUNTER — OFFICE VISIT (OUTPATIENT)
Dept: OTOLARYNGOLOGY | Facility: CLINIC | Age: 34
End: 2024-12-09
Payer: COMMERCIAL

## 2024-12-09 VITALS — WEIGHT: 212.31 LBS | BODY MASS INDEX: 30.46 KG/M2

## 2024-12-09 DIAGNOSIS — H61.22 LEFT EAR IMPACTED CERUMEN: Primary | ICD-10-CM

## 2024-12-09 DIAGNOSIS — H60.92 OTITIS EXTERNA OF LEFT EAR, UNSPECIFIED CHRONICITY, UNSPECIFIED TYPE: ICD-10-CM

## 2024-12-09 PROCEDURE — 69210 REMOVE IMPACTED EAR WAX UNI: CPT | Mod: S$GLB,,, | Performed by: STUDENT IN AN ORGANIZED HEALTH CARE EDUCATION/TRAINING PROGRAM

## 2024-12-09 PROCEDURE — 99214 OFFICE O/P EST MOD 30 MIN: CPT | Mod: 25,S$GLB,, | Performed by: STUDENT IN AN ORGANIZED HEALTH CARE EDUCATION/TRAINING PROGRAM

## 2024-12-09 PROCEDURE — 99999 PR PBB SHADOW E&M-EST. PATIENT-LVL II: CPT | Mod: PBBFAC,,, | Performed by: STUDENT IN AN ORGANIZED HEALTH CARE EDUCATION/TRAINING PROGRAM

## 2024-12-09 PROCEDURE — 3008F BODY MASS INDEX DOCD: CPT | Mod: CPTII,S$GLB,, | Performed by: STUDENT IN AN ORGANIZED HEALTH CARE EDUCATION/TRAINING PROGRAM

## 2024-12-09 RX ORDER — CIPROFLOXACIN AND DEXAMETHASONE 3; 1 MG/ML; MG/ML
4 SUSPENSION/ DROPS AURICULAR (OTIC) 2 TIMES DAILY
Qty: 7.5 ML | Refills: 0 | Status: SHIPPED | OUTPATIENT
Start: 2024-12-09

## 2024-12-09 NOTE — PROGRESS NOTES
"REFERRING PROVIDER  No referring provider defined for this encounter.  Subjective:   Patient: Alphonse Claude Mackey III [id number], :1990   Visit date:2024 3:27 PM    Chief Complaint:  other (Left ear "crusting" for the last few weeks - denies pain - drainage & tinnitus - never had in the past)        HPI:     Alphonse Claude Mackey III is a 34 y.o. male whom I am asked to see for evaluation of progressively worsening hearing loss in lfet ear, and a crust in the left ear. He does also have tinnitus now.       Raj rates the severity as moderate.  No exacerbating or relieving factors.  There is no drainage from the ears.      His meds, allergies, medical, surgical, social & family histories were reviewed & updated:  -     He has a current medication list which includes the following prescription(s): amlodipine, cholecalciferol (vitamin d3), escitalopram oxalate, multivit,calc,min/fa/k1/lycop, propranolol, and vitamin b complex.  -     He  has a past medical history of Anxiety, Cervical radiculopathy (2022), and Primary hypertension (2022).   -     He does not have any pertinent problems on file.   -     He  has a past surgical history that includes Adenoidectomy; Tonsillectomy; Anterior cervical discectomy w/ fusion (Left, 2022); and Dissection of neck (Left, 2022).  -     He  reports that he has never smoked. He has never used smokeless tobacco. He reports that he does not drink alcohol and does not use drugs.  -     His family history includes Cancer in his paternal grandfather; Hypertension in his mother; No Known Problems in his sister; Obesity in his paternal grandmother.  -     He has No Known Allergies.      Review of Systems:  -     Allergic/Immunologic: has No Known Allergies..  -     Constitutional: Current temp:          Objective:     Physical Exam:  Vitals:  Wt 96.3 kg (212 lb 4.9 oz)   BMI 30.46 kg/m²   Communication:  Able to communicate, no hoarseness.  Head & Face:  " Normocephalic, atraumatic, no sinus tenderness.  Eyes:  Extraocular motions intact.  Ears:  Otoscopy of external auditory canals reveals impaction of bilateral ear canals.  With the patient in the supine position, we used the operating microscope to examine both ears with the appropriate sized ear speculum.  A variety of sterile, micro-instruments were utilized to remove the cerumen atraumatically from the impacted ear(s).   After removal, the ears were reexamined-  Right Ear:  No mass/lesion of auricle. The external auditory canals is without erythema or discharge. Pneumatic otoscopy of the tympanic membrane revealed no perforation, with no fluid in middle ear. Clinical speech reception thresholds grossly normal.  Left Ear:  No mass/lesion of auricle. The external auditory canal was dry and mildly inflamed. Pneumatic otoscopy of the tympanic membrane revealed no perforation, with no fluid in middle ear. Clinical speech reception thresholds grossly normal  Nose:  No masses/lesions of external nose, nasal mucosa, septum, and turbinates were within normal limits.  Mouth:  No mass/lesion of lips, teeth, gums, hard/soft palate, tongue, tonsils, or oropharynx.  Neck & Lymphatics:  No cervical lymphadenopathy, no neck mass/crepitus/ asymmetry, trachea is midline, no thyroid enlargement/tenderness/mass.  Neuro/Psych: Alert with normal mood and affect.   Respiration/Chest:  Symmetric expansion during respiration, normal respiratory effort.  Skin:  Warm and intact.    Assessment & Plan:       Mild inflammation of the left EAC likely responsible for kertain debris and recurrent impaction  Debrided today  Drops x 1 week  F/u as needed

## (undated) DEVICE — DRESSING SURGICAL 1/2X1/2

## (undated) DEVICE — DRAPE STERI INSTRUMENT 1018

## (undated) DEVICE — UNDERGLOVE BIOGEL PI SZ 6.5 LF

## (undated) DEVICE — SPONGE NEURO 1/4X1/4

## (undated) DEVICE — SUT SILK 3-0 STRANDS 30IN

## (undated) DEVICE — UNDERGLOVES BIOGEL PI SIZE 8

## (undated) DEVICE — REMOVER LOTION

## (undated) DEVICE — SHEET THYROID W/ISO-BAC

## (undated) DEVICE — GLOVE BIOGEL ECLIPSE SZ 8

## (undated) DEVICE — SUT MONOCRYL 4.0 PS2 CP496G

## (undated) DEVICE — SYR ONLY LUER LOCK 20CC

## (undated) DEVICE — HALTER DELUXE DISCARD HEAD

## (undated) DEVICE — ADHESIVE MASTISOL VIAL 48/BX

## (undated) DEVICE — SEE MEDLINE ITEM 157194

## (undated) DEVICE — ELECTRODE REM PLYHSV RETURN 9

## (undated) DEVICE — TUBING MEDI-VAC 20FT .25IN

## (undated) DEVICE — DRAPE INCISE IOBAN 2 23X17IN

## (undated) DEVICE — TUBE LUKI ASP COLL 6 1/4

## (undated) DEVICE — DRESSING SURGICAL 1X1

## (undated) DEVICE — COVER LIGHT HANDLE 80/CA

## (undated) DEVICE — SUT SILK 2-0 STRANDS 30IN

## (undated) DEVICE — PIN DISTRACTION 12MM
Type: IMPLANTABLE DEVICE | Site: SPINE CERVICAL | Status: NON-FUNCTIONAL
Removed: 2022-07-13

## (undated) DEVICE — TOWEL OR DISP STRL BLUE 4/PK

## (undated) DEVICE — CORD BIPOLAR 12 FOOT

## (undated) DEVICE — SUT VICRYL PLUS 3-0 SH 18IN

## (undated) DEVICE — SKIN MARKER DEVON 160

## (undated) DEVICE — GAUZE SPONGE PEANUT STRL

## (undated) DEVICE — MATRIX FLOSEAL HEMOSTATIC 10ML

## (undated) DEVICE — TRAY CATH FOL SIL URIMTR 16FR

## (undated) DEVICE — MANIFOLD 4 PORT

## (undated) DEVICE — HEADREST ROUND DISP FOAM 9IN

## (undated) DEVICE — NDL SAFETY 22G X 1.5 ECLIPSE

## (undated) DEVICE — NDL SPINAL 20GX3.5 HUB

## (undated) DEVICE — DRAPE OPMI STERILE

## (undated) DEVICE — SUT VICRYL+ 2-0 SH 18IN

## (undated) DEVICE — ALCOHOL 70% ISOP RUBBING 4OZ

## (undated) DEVICE — SUPPORT ULNA NERVE PROTECTOR

## (undated) DEVICE — SPONGE GAUZE 16PLY 4X4

## (undated) DEVICE — BLADE EZ CLEAN 2.5IN MODIFIED

## (undated) DEVICE — ADHESIVE DERMABOND ADVANCED

## (undated) DEVICE — DRAPE MOBILE C-ARM

## (undated) DEVICE — SEE MEDLINE ITEM 157117

## (undated) DEVICE — PACK BASIC SETUP SC BR

## (undated) DEVICE — BUR LEGEND MIDAS REX 14CM 13MM

## (undated) DEVICE — HANDSWITCH SUCTION COAG

## (undated) DEVICE — GLOVE SURGICAL LATEX SZ 7

## (undated) DEVICE — SEE MEDLINE ITEM 157131

## (undated) DEVICE — GAUZE SPONGE 4X4 12PLY